# Patient Record
Sex: FEMALE | Race: WHITE | NOT HISPANIC OR LATINO | Employment: OTHER | URBAN - METROPOLITAN AREA
[De-identification: names, ages, dates, MRNs, and addresses within clinical notes are randomized per-mention and may not be internally consistent; named-entity substitution may affect disease eponyms.]

---

## 2017-07-20 ENCOUNTER — CONVERSION ENCOUNTER (OUTPATIENT)
Dept: OTHER | Facility: HOSPITAL | Age: 57
End: 2017-07-20

## 2018-02-14 ENCOUNTER — OFFICE VISIT CONVERTED (OUTPATIENT)
Dept: GASTROENTEROLOGY | Facility: CLINIC | Age: 58
End: 2018-02-14
Attending: NURSE PRACTITIONER

## 2018-02-14 ENCOUNTER — CONVERSION ENCOUNTER (OUTPATIENT)
Dept: GASTROENTEROLOGY | Facility: CLINIC | Age: 58
End: 2018-02-14

## 2018-02-28 ENCOUNTER — OFFICE VISIT CONVERTED (OUTPATIENT)
Dept: FAMILY MEDICINE CLINIC | Age: 58
End: 2018-02-28
Attending: FAMILY MEDICINE

## 2018-03-01 LAB
ALBUMIN SERPL-MCNC: 4.6 G/DL
ALBUMIN/GLOB SERPL: 2.3 {RATIO}
ALP SERPL-CCNC: 62 IU/L
ALT SERPL-CCNC: 15 IU/L
AST SERPL-CCNC: 19 IU/L
BILIRUB SERPL-MCNC: 0.6 MG/DL
BUN SERPL-MCNC: 19 MG/DL
BUN/CREAT SERPL: 20
CALCIUM SERPL-MCNC: 9.4 MG/DL
CHLORIDE SERPL-SCNC: 100 MMOL/L
CHOLEST SERPL-MCNC: 144 MG/DL
CO2 SERPL-SCNC: 24 MMOL/L
CONV TOTAL PROTEIN: 6.6 G/DL
CREAT UR-MCNC: 0.95 MG/DL
GLOBULIN UR ELPH-MCNC: 2 G/DL
GLUCOSE SERPL-MCNC: 84 MG/DL
HDLC SERPL-MCNC: 67 MG/DL
LDLC SERPL CALC-MCNC: 68 MG/DL
POTASSIUM SERPL-SCNC: 4.7 MMOL/L
SODIUM SERPL-SCNC: 142 MMOL/L
TRIGL SERPL-MCNC: 44 MG/DL
TSH SERPL-ACNC: 1.95 UIU/ML
VLDLC SERPL-MCNC: 9 MG/DL

## 2018-05-14 ENCOUNTER — OFFICE VISIT CONVERTED (OUTPATIENT)
Dept: GASTROENTEROLOGY | Facility: CLINIC | Age: 58
End: 2018-05-14
Attending: NURSE PRACTITIONER

## 2018-07-03 ENCOUNTER — OFFICE VISIT CONVERTED (OUTPATIENT)
Dept: FAMILY MEDICINE CLINIC | Age: 58
End: 2018-07-03
Attending: FAMILY MEDICINE

## 2018-07-26 ENCOUNTER — CONVERSION ENCOUNTER (OUTPATIENT)
Dept: MAMMOGRAPHY | Facility: HOSPITAL | Age: 58
End: 2018-07-26

## 2018-10-05 ENCOUNTER — OFFICE VISIT CONVERTED (OUTPATIENT)
Dept: FAMILY MEDICINE CLINIC | Age: 58
End: 2018-10-05
Attending: FAMILY MEDICINE

## 2018-11-14 ENCOUNTER — CONVERSION ENCOUNTER (OUTPATIENT)
Dept: GASTROENTEROLOGY | Facility: CLINIC | Age: 58
End: 2018-11-14

## 2018-11-14 ENCOUNTER — OFFICE VISIT CONVERTED (OUTPATIENT)
Dept: GASTROENTEROLOGY | Facility: CLINIC | Age: 58
End: 2018-11-14
Attending: NURSE PRACTITIONER

## 2019-01-09 ENCOUNTER — OFFICE VISIT CONVERTED (OUTPATIENT)
Dept: FAMILY MEDICINE CLINIC | Age: 59
End: 2019-01-09
Attending: FAMILY MEDICINE

## 2019-01-16 ENCOUNTER — HOSPITAL ENCOUNTER (OUTPATIENT)
Dept: OTHER | Facility: HOSPITAL | Age: 59
Discharge: HOME | End: 2019-01-16
Attending: FAMILY MEDICINE

## 2019-04-01 ENCOUNTER — OFFICE VISIT CONVERTED (OUTPATIENT)
Dept: FAMILY MEDICINE CLINIC | Age: 59
End: 2019-04-01
Attending: FAMILY MEDICINE

## 2019-04-01 ENCOUNTER — HOSPITAL ENCOUNTER (OUTPATIENT)
Dept: OTHER | Facility: HOSPITAL | Age: 59
Discharge: HOME | End: 2019-04-01
Attending: FAMILY MEDICINE

## 2019-04-01 LAB
ALBUMIN SERPL-MCNC: 4.2 G/DL (ref 3.5–5)
ALBUMIN/GLOB SERPL: 1.6 {RATIO} (ref 1.4–2.6)
ALP SERPL-CCNC: 56 U/L (ref 53–141)
ALT SERPL-CCNC: 16 U/L (ref 10–40)
ANION GAP SERPL CALC-SCNC: 15 MMOL/L (ref 8–19)
AST SERPL-CCNC: 25 U/L (ref 15–50)
BILIRUB SERPL-MCNC: 0.47 MG/DL (ref 0.2–1.3)
BUN SERPL-MCNC: 22 MG/DL (ref 5–25)
BUN/CREAT SERPL: 24 {RATIO} (ref 6–20)
CALCIUM SERPL-MCNC: 8.7 MG/DL (ref 8.7–10.4)
CHLORIDE SERPL-SCNC: 103 MMOL/L (ref 99–111)
CHOLEST SERPL-MCNC: 120 MG/DL (ref 107–200)
CHOLEST/HDLC SERPL: 2 {RATIO} (ref 3–6)
CONV CO2: 25 MMOL/L (ref 22–32)
CONV TOTAL PROTEIN: 6.9 G/DL (ref 6.3–8.2)
CREAT UR-MCNC: 0.92 MG/DL (ref 0.5–0.9)
GFR SERPLBLD BASED ON 1.73 SQ M-ARVRAT: >60 ML/MIN/{1.73_M2}
GLOBULIN UR ELPH-MCNC: 2.7 G/DL (ref 2–3.5)
GLUCOSE SERPL-MCNC: 84 MG/DL (ref 65–99)
HDLC SERPL-MCNC: 61 MG/DL (ref 40–60)
LDLC SERPL CALC-MCNC: 52 MG/DL (ref 70–100)
OSMOLALITY SERPL CALC.SUM OF ELEC: 291 MOSM/KG (ref 273–304)
POTASSIUM SERPL-SCNC: 4.2 MMOL/L (ref 3.5–5.3)
SODIUM SERPL-SCNC: 139 MMOL/L (ref 135–147)
TRIGL SERPL-MCNC: 33 MG/DL (ref 40–150)
TSH SERPL-ACNC: 1.12 M[IU]/L (ref 0.27–4.2)
VLDLC SERPL-MCNC: 7 MG/DL (ref 5–37)

## 2019-04-04 LAB — GABAPENTIN UR-MCNC: 125.8 UG/ML

## 2019-04-12 LAB
CONV TRAMADOL (URINE): NORMAL NG/ML
O-DESMETHYLTRAMADOL, UR: 1762 NG/ML

## 2019-05-14 ENCOUNTER — OFFICE VISIT CONVERTED (OUTPATIENT)
Dept: GASTROENTEROLOGY | Facility: CLINIC | Age: 59
End: 2019-05-14
Attending: NURSE PRACTITIONER

## 2019-07-03 ENCOUNTER — OFFICE VISIT CONVERTED (OUTPATIENT)
Dept: FAMILY MEDICINE CLINIC | Age: 59
End: 2019-07-03
Attending: FAMILY MEDICINE

## 2019-07-29 ENCOUNTER — HOSPITAL ENCOUNTER (OUTPATIENT)
Dept: OTHER | Facility: HOSPITAL | Age: 59
Discharge: HOME | End: 2019-07-29
Attending: SPECIALIST

## 2019-10-08 ENCOUNTER — OFFICE VISIT CONVERTED (OUTPATIENT)
Dept: FAMILY MEDICINE CLINIC | Age: 59
End: 2019-10-08
Attending: FAMILY MEDICINE

## 2019-10-08 ENCOUNTER — HOSPITAL ENCOUNTER (OUTPATIENT)
Dept: OTHER | Facility: HOSPITAL | Age: 59
Discharge: HOME | End: 2019-10-08
Attending: FAMILY MEDICINE

## 2019-10-08 LAB
ALBUMIN SERPL-MCNC: 4.3 G/DL (ref 3.5–5)
ALBUMIN/GLOB SERPL: 1.6 {RATIO} (ref 1.4–2.6)
ALP SERPL-CCNC: 59 U/L (ref 53–141)
ALT SERPL-CCNC: 22 U/L (ref 10–40)
ANION GAP SERPL CALC-SCNC: 16 MMOL/L (ref 8–19)
AST SERPL-CCNC: 29 U/L (ref 15–50)
BILIRUB SERPL-MCNC: 0.5 MG/DL (ref 0.2–1.3)
BUN SERPL-MCNC: 18 MG/DL (ref 5–25)
BUN/CREAT SERPL: 22 {RATIO} (ref 6–20)
CALCIUM SERPL-MCNC: 9.1 MG/DL (ref 8.7–10.4)
CHLORIDE SERPL-SCNC: 104 MMOL/L (ref 99–111)
CHOLEST SERPL-MCNC: 136 MG/DL (ref 107–200)
CHOLEST/HDLC SERPL: 2.2 {RATIO} (ref 3–6)
CONV CO2: 24 MMOL/L (ref 22–32)
CONV TOTAL PROTEIN: 7 G/DL (ref 6.3–8.2)
CREAT UR-MCNC: 0.83 MG/DL (ref 0.5–0.9)
GFR SERPLBLD BASED ON 1.73 SQ M-ARVRAT: >60 ML/MIN/{1.73_M2}
GLOBULIN UR ELPH-MCNC: 2.7 G/DL (ref 2–3.5)
GLUCOSE SERPL-MCNC: 93 MG/DL (ref 65–99)
HDLC SERPL-MCNC: 62 MG/DL (ref 40–60)
LDLC SERPL CALC-MCNC: 64 MG/DL (ref 70–100)
OSMOLALITY SERPL CALC.SUM OF ELEC: 292 MOSM/KG (ref 273–304)
POTASSIUM SERPL-SCNC: 4.4 MMOL/L (ref 3.5–5.3)
SODIUM SERPL-SCNC: 140 MMOL/L (ref 135–147)
TRIGL SERPL-MCNC: 49 MG/DL (ref 40–150)
TSH SERPL-ACNC: 0.55 M[IU]/L (ref 0.27–4.2)
VLDLC SERPL-MCNC: 10 MG/DL (ref 5–37)

## 2019-10-10 LAB — GABAPENTIN UR-MCNC: 230.9 UG/ML

## 2019-10-12 LAB
CONV TRAMADOL (URINE): 2631 NG/ML
O-DESMETHYLTRAMADOL, UR: 805 NG/ML

## 2019-11-14 ENCOUNTER — OFFICE VISIT CONVERTED (OUTPATIENT)
Dept: GASTROENTEROLOGY | Facility: CLINIC | Age: 59
End: 2019-11-14
Attending: NURSE PRACTITIONER

## 2020-01-08 ENCOUNTER — OFFICE VISIT CONVERTED (OUTPATIENT)
Dept: FAMILY MEDICINE CLINIC | Age: 60
End: 2020-01-08
Attending: FAMILY MEDICINE

## 2020-04-15 ENCOUNTER — OFFICE VISIT CONVERTED (OUTPATIENT)
Dept: FAMILY MEDICINE CLINIC | Age: 60
End: 2020-04-15
Attending: FAMILY MEDICINE

## 2020-05-14 ENCOUNTER — TELEPHONE CONVERTED (OUTPATIENT)
Dept: GASTROENTEROLOGY | Facility: CLINIC | Age: 60
End: 2020-05-14
Attending: NURSE PRACTITIONER

## 2020-07-09 ENCOUNTER — OFFICE VISIT CONVERTED (OUTPATIENT)
Dept: FAMILY MEDICINE CLINIC | Age: 60
End: 2020-07-09
Attending: FAMILY MEDICINE

## 2020-07-09 ENCOUNTER — HOSPITAL ENCOUNTER (OUTPATIENT)
Dept: OTHER | Facility: HOSPITAL | Age: 60
Discharge: HOME | End: 2020-07-09
Attending: FAMILY MEDICINE

## 2020-07-09 LAB
ALBUMIN SERPL-MCNC: 4.4 G/DL (ref 3.5–5)
ALBUMIN/GLOB SERPL: 1.9 {RATIO} (ref 1.4–2.6)
ALP SERPL-CCNC: 66 U/L (ref 43–160)
ALT SERPL-CCNC: 34 U/L (ref 10–40)
ANION GAP SERPL CALC-SCNC: 15 MMOL/L (ref 8–19)
AST SERPL-CCNC: 37 U/L (ref 15–50)
BILIRUB SERPL-MCNC: 0.49 MG/DL (ref 0.2–1.3)
BUN SERPL-MCNC: 31 MG/DL (ref 5–25)
BUN/CREAT SERPL: 30 {RATIO} (ref 6–20)
CALCIUM SERPL-MCNC: 9.1 MG/DL (ref 8.7–10.4)
CHLORIDE SERPL-SCNC: 105 MMOL/L (ref 99–111)
CHOLEST SERPL-MCNC: 127 MG/DL (ref 107–200)
CHOLEST/HDLC SERPL: 1.9 {RATIO} (ref 3–6)
CONV CO2: 24 MMOL/L (ref 22–32)
CONV TOTAL PROTEIN: 6.7 G/DL (ref 6.3–8.2)
CREAT UR-MCNC: 1.05 MG/DL (ref 0.5–0.9)
GFR SERPLBLD BASED ON 1.73 SQ M-ARVRAT: 57 ML/MIN/{1.73_M2}
GLOBULIN UR ELPH-MCNC: 2.3 G/DL (ref 2–3.5)
GLUCOSE SERPL-MCNC: 84 MG/DL (ref 65–99)
HDLC SERPL-MCNC: 66 MG/DL (ref 40–60)
LDLC SERPL CALC-MCNC: 54 MG/DL (ref 70–100)
OSMOLALITY SERPL CALC.SUM OF ELEC: 294 MOSM/KG (ref 273–304)
POTASSIUM SERPL-SCNC: 4.7 MMOL/L (ref 3.5–5.3)
SODIUM SERPL-SCNC: 139 MMOL/L (ref 135–147)
TRIGL SERPL-MCNC: 35 MG/DL (ref 40–150)
TSH SERPL-ACNC: 0.98 M[IU]/L (ref 0.27–4.2)
VLDLC SERPL-MCNC: 7 MG/DL (ref 5–37)

## 2020-08-24 ENCOUNTER — HOSPITAL ENCOUNTER (OUTPATIENT)
Dept: OTHER | Facility: HOSPITAL | Age: 60
Discharge: HOME | End: 2020-08-24
Attending: FAMILY MEDICINE

## 2020-09-11 ENCOUNTER — CONVERSION ENCOUNTER (OUTPATIENT)
Dept: FAMILY MEDICINE CLINIC | Age: 60
End: 2020-09-11

## 2020-09-11 ENCOUNTER — HOSPITAL ENCOUNTER (OUTPATIENT)
Dept: OTHER | Facility: HOSPITAL | Age: 60
Discharge: HOME | End: 2020-09-11
Attending: INTERNAL MEDICINE

## 2020-09-13 LAB — SARS-COV-2 RNA SPEC QL NAA+PROBE: NOT DETECTED

## 2020-09-16 ENCOUNTER — HOSPITAL ENCOUNTER (OUTPATIENT)
Dept: GASTROENTEROLOGY | Facility: HOSPITAL | Age: 60
Setting detail: HOSPITAL OUTPATIENT SURGERY
Discharge: HOME | End: 2020-09-16
Attending: INTERNAL MEDICINE

## 2020-10-15 ENCOUNTER — OFFICE VISIT CONVERTED (OUTPATIENT)
Dept: FAMILY MEDICINE CLINIC | Age: 60
End: 2020-10-15
Attending: FAMILY MEDICINE

## 2020-11-16 ENCOUNTER — OFFICE VISIT CONVERTED (OUTPATIENT)
Dept: GASTROENTEROLOGY | Facility: CLINIC | Age: 60
End: 2020-11-16
Attending: NURSE PRACTITIONER

## 2021-02-02 ENCOUNTER — HOSPITAL ENCOUNTER (OUTPATIENT)
Dept: OTHER | Facility: HOSPITAL | Age: 61
Discharge: HOME | End: 2021-02-02
Attending: FAMILY MEDICINE

## 2021-04-15 ENCOUNTER — OFFICE VISIT CONVERTED (OUTPATIENT)
Dept: FAMILY MEDICINE CLINIC | Age: 61
End: 2021-04-15
Attending: FAMILY MEDICINE

## 2021-04-15 ENCOUNTER — HOSPITAL ENCOUNTER (OUTPATIENT)
Dept: OTHER | Facility: HOSPITAL | Age: 61
Discharge: HOME | End: 2021-04-15
Attending: FAMILY MEDICINE

## 2021-04-15 LAB
ALBUMIN SERPL-MCNC: 4.3 G/DL (ref 3.5–5)
ALBUMIN/GLOB SERPL: 2 {RATIO} (ref 1.4–2.6)
ALP SERPL-CCNC: 59 U/L (ref 43–160)
ALT SERPL-CCNC: 30 U/L (ref 10–40)
ANION GAP SERPL CALC-SCNC: 15 MMOL/L (ref 8–19)
AST SERPL-CCNC: 33 U/L (ref 15–50)
BASOPHILS # BLD MANUAL: 0.03 10*3/UL (ref 0–0.2)
BASOPHILS NFR BLD MANUAL: 0.5 % (ref 0–3)
BILIRUB SERPL-MCNC: 0.4 MG/DL (ref 0.2–1.3)
BUN SERPL-MCNC: 19 MG/DL (ref 5–25)
BUN/CREAT SERPL: 18 {RATIO} (ref 6–20)
CALCIUM SERPL-MCNC: 8.7 MG/DL (ref 8.7–10.4)
CHLORIDE SERPL-SCNC: 102 MMOL/L (ref 99–111)
CHOLEST SERPL-MCNC: 135 MG/DL (ref 107–200)
CHOLEST/HDLC SERPL: 2.4 {RATIO} (ref 3–6)
CONV CO2: 26 MMOL/L (ref 22–32)
CONV TOTAL PROTEIN: 6.5 G/DL (ref 6.3–8.2)
CREAT UR-MCNC: 1.03 MG/DL (ref 0.5–0.9)
DEPRECATED RDW RBC AUTO: 46.7 FL
EOSINOPHIL # BLD MANUAL: 0.17 10*3/UL (ref 0–0.7)
EOSINOPHIL NFR BLD MANUAL: 2.6 % (ref 0–7)
ERYTHROCYTE [DISTWIDTH] IN BLOOD BY AUTOMATED COUNT: 12.9 % (ref 11.5–14.5)
GFR SERPLBLD BASED ON 1.73 SQ M-ARVRAT: 58 ML/MIN/{1.73_M2}
GLOBULIN UR ELPH-MCNC: 2.2 G/DL (ref 2–3.5)
GLUCOSE SERPL-MCNC: 82 MG/DL (ref 65–99)
GRANS (ABSOLUTE): 3.68 10*3/UL (ref 2–8)
GRANS: 57.1 % (ref 30–85)
HBA1C MFR BLD: 13.1 G/DL (ref 12–16)
HCT VFR BLD AUTO: 40.4 % (ref 37–47)
HDLC SERPL-MCNC: 57 MG/DL (ref 40–60)
IMM GRANULOCYTES # BLD: 0.01 10*3/UL (ref 0–0.54)
IMM GRANULOCYTES NFR BLD: 0.2 % (ref 0–0.43)
LDLC SERPL CALC-MCNC: 67 MG/DL (ref 70–100)
LYMPHOCYTES # BLD MANUAL: 2.08 10*3/UL (ref 1–5)
LYMPHOCYTES NFR BLD MANUAL: 7.3 % (ref 3–10)
MCH RBC QN AUTO: 31.5 PG (ref 27–31)
MCHC RBC AUTO-ENTMCNC: 32.4 G/DL (ref 33–37)
MCV RBC AUTO: 97.1 FL (ref 81–99)
MONOCYTES # BLD AUTO: 0.47 10*3/UL (ref 0.2–1.2)
OSMOLALITY SERPL CALC.SUM OF ELEC: 289 MOSM/KG (ref 273–304)
PLATELET # BLD AUTO: 209 10*3/UL (ref 130–400)
PMV BLD AUTO: 10.2 FL (ref 7.4–10.4)
POTASSIUM SERPL-SCNC: 4.4 MMOL/L (ref 3.5–5.3)
RBC # BLD AUTO: 4.16 10*6/UL (ref 4.2–5.4)
SODIUM SERPL-SCNC: 139 MMOL/L (ref 135–147)
TRIGL SERPL-MCNC: 56 MG/DL (ref 40–150)
TSH SERPL-ACNC: 2.48 M[IU]/L (ref 0.27–4.2)
VARIANT LYMPHS NFR BLD MANUAL: 32.3 % (ref 20–45)
VLDLC SERPL-MCNC: 11 MG/DL (ref 5–37)
WBC # BLD AUTO: 6.44 10*3/UL (ref 4.8–10.8)

## 2021-05-13 NOTE — PROGRESS NOTES
"   Quick Note      Patient Name: Addie Yates   Patient ID: [id number]   Sex: Female   YOB: 1960    Primary Care Provider: Jammie Phillips MD   Referring Provider: Jammie Phillips MD    Visit Date: May 14, 2020    Provider: KEVON De La Garza   Location: [facility]   Location Address: [address] [place] [address], [address]  [place], [place]  [id number]   Location Phone: [phone]          History Of Present Illness  TELEHEALTH TELEPHONE VISIT  Chief Complaint: f/u constipation and dysphagia   Addie Yates is a 60 year old /White female who is presenting for evaluation via telehealth telephone visit. Verbal consent obtained before beginning visit.   Provider spent 7 minutes with the patient during telehealth visit.   The following staff were present during this visit: Shona Garcia MA, KEVON De La Garza   Past Medical History/Overview of Patient Symptoms     She reports that she is having a bowel movement about once per week, however the amount of stool is large.  States that she feels like she is empty following bowel movements.  She has noticed some residual stool following a bowel movement when she wipes.  She is due for screening colonoscopy.    She is continuing to experience some dysphagia.  She is swallowing twice to get foods to go down.  Drinking with meals.  Denies choking.  Heartburn is controlled with pantoprazole.      She continues to experience some epigastric discomfort with meals that is relieved with gas-x.       Vitals  Date Time BP Position Site L\R Cuff Size HR RR TEMP (F) WT  HT  BMI kg/m2 BSA m2 O2 Sat        05/14/2020 10:33 AM         130lbs 0oz 5'  4\" 22.31 1.63               Assessment  · Dysphagia     787.20/R13.10  · GERD (gastroesophageal reflux disease)     530.81/K21.9  · Chronic idiopathic constipation     564.00/K59.04  · Gastroparesis     536.3/K31.84  · Screening for colon cancer     V76.51/Z12.11      Plan  · Orders  o Physican Telephone " evaluation, 5-10 min (10878) - - 05/14/2020  o Consent for Colonoscopy Screening -Possible risk/complications, benefits, and alternatives to surgical or invasive procedure have been explained to patient and/or legal gaurdian. -Patient has been evaluated and can tolerate anethesia and/or sedation. Risk, benefits, and alternatives to anesthesia and sedation have been explained to patient or legal gaurdian. () - - 05/14/2020  · Medications  o Motegrity 2 mg oral tablet   SIG: take 1 tablet (2 mg) by oral route once daily for 30 days   DISP: (30) tablets with 5 refills  Refilled on 05/14/2020     o pantoprazole 40 mg oral tablet,delayed release (DR/EC)   SIG: take 1 tablet (40 mg) by oral route once daily for 90 days   DISP: (90) Tablet with 5 refills  Refilled on 05/14/2020     o Medications have been Reconciled  · Disposition  o Follow up 6 months            Electronically Signed by: KEVON De La Garza -Author on May 14, 2020 10:52:44 AM

## 2021-05-13 NOTE — PROGRESS NOTES
Progress Note      Patient Name: Addie Yates   Patient ID: [id number]   Sex: Female   YOB: 1960    Primary Care Provider: Jammie Phillips MD   Referring Provider: Jammie Phillips MD    Visit Date: November 16, 2020    Provider: KEVON De La Garza   Location: [facility]   Location Address: [address] [place] [address], [address]  [place], [place]  [id number]   Location Phone: [phone]          Chief Complaint  · Follow up of EGD/Colonoscopy      History Of Present Illness     Ms. Yates presents for follow-up of dysphagia, GERD, constipation, gastroparesis and screening for colorectal cancer.    9/16/2020 colonoscopy-mild diverticulosis in the sigmoid colon.  Irregularly-shaped patchy polypoid tissue in the distal 15 mm of the rectum.  This extended greater than 75% of the circumference of the distal rectum wall, biopsy-high-grade squamous intraepithelial lesion arising in a condyloma.    She underwent a procedure on 10/7 per colorectal surgery and states that she has another procedure scheduled for tomorrow morning.    She is continuing to experience constipation.  Reports that initially motegrity was working well, but now it's not working as well.  Also takes Colace BID.   She is having a bowel movement about once per week and admits straining with bowel movements.      She does not drink water at all.  Diet pepsi - 1 per day.  2 cups of hot tea daily and 2 cups of coffee daily.       Past Medical History  Arthritis; Carpal tunnel syndrome; GERD; Heart Attack (MI); Medial epicondylitis, right elbow; Occipital neuralgia; Pain, Arm; Pain, Back; Pain, Cervical Spine; Pain, Chronic Pain Syndrome; Pain, Generalized; Pain, Head; Pain, Leg; Pain, Thoracic Spine; Pneumonia; Thoracic outlet syndrome; Thyroid Disease; Vision Problems         Past Surgical History  cardiac stents; Cataract surgery; Cholecystecomy; Colonoscopy; EGD; Glaucoma surgery; Hysterectomy; Tubal ligation; Tumor  "removal         Medication List  aspirin 81 mg oral tablet,delayed release (DR/EC); atorvastatin 80 mg oral tablet; Calcium 500 + D (D3) 500 mg(1,250mg) -125 unit oral tablet; diclofenac sodium 75 mg oral tablet,delayed release (DR/EC); duloxetine 60 mg oral capsule,delayed release(DR/EC); gabapentin 300 mg oral capsule; Gas-X Extra Strength 125 mg oral capsule; levothyroxine 88 mcg Oral tablet; lisinopril 5 mg oral tablet; Motegrity 2 mg oral tablet; pantoprazole 40 mg oral tablet,delayed release (DR/EC); Prolia 60 mg/mL subcutaneous syringe; tramadol 50 mg oral tablet         Allergy List  NO KNOWN DRUG ALLERGIES       Allergies Reconciled  Family Medical History  Family history of breast cancer; Family history of kidney cancer; Family history of heart attack; Family history of malignant neoplasm of ureter         Social History  Alcohol (Former); Tobacco (Former)         Review of Systems  · Constitutional  o Denies  o : chills, fever  · Cardiovascular  o Denies  o : chest pain, irregular heart beats  · Respiratory  o Denies  o : cough, shortness of breath  · Gastrointestinal  o Admits  o : see HPI   · Endocrine  o Denies  o : weight gain, weight loss      Vitals  Date Time BP Position Site L\R Cuff Size HR RR TEMP (F) WT  HT  BMI kg/m2 BSA m2 O2 Sat FR L/min FiO2        11/16/2020 10:59 /86 Sitting      98.1 137lbs 6oz 5'  4\" 23.58 1.68             Physical Examination  · Constitutional  o Appearance  o : Healthy-appearing, awake and alert in no acute distress  · Head and Face  o Head  o : Normocephalic with no worriesome skin lesions  · Eyes  o Vision  o :   § Visual Fields  § : eyes move symmetrical in all directions  o Sclerae  o : sclerae anicteric  o Pupils and Irises  o : pupils equal and symmetrical  · Neck  o Inspection/Palpation  o : Trachea is midline, no adenopathy  · Respiratory  o Respiratory Effort  o : Breathing is unlabored.  o Inspection of Chest  o : normal appearance  o Auscultation of " Lungs  o : Chest is clear to auscultation bilaterally.  · Cardiovascular  o Heart  o :   § Auscultation of Heart  § : no murmurs, rubs, or gallops  o Peripheral Vascular System  o :   § Extremities  § : no cyanosis, clubbing or edema;   · Gastrointestinal  o Abdominal Examination  o : Abdomen is soft, nontender to palpation, with normal active bowel sounds, no appreciable hepatosplenomegaly.  o Digital Rectal Exam  o : deferred  · Skin and Subcutaneous Tissue  o General Inspection  o : without focal lesions; turgor is normal  · Psychiatric  o General  o : Alert and oriented x3  o Mood and Affect  o : Mood and affect are appropriate to circumstances  · Extremities  o Extremities  o : No edema, no cyanosis          Assessment  · Chronic idiopathic constipation     564.00/K59.04  · Gastroparesis     536.3/K31.84  · Condyloma acuminata     078.11/A63.0      Plan  · Medications  o Linzess 290 mcg oral capsule   SIG: take 1 capsule (290 mcg) by oral route once daily on an empty stomach at least 30 minutes before 1st meal of the day for 30 days   DISP: (30) Capsule with 5 refills  Prescribed on 11/16/2020     o Motegrity 2 mg oral tablet   SIG: take 1 tablet (2 mg) by oral route once daily for 30 days   DISP: (30) Tablet with 5 refills  Refilled on 11/16/2020     o Colace 100 mg oral capsule   SIG: take 1 capsule (100 mg) by oral route 2 times per day for 90 days   DISP: (180) capsule with 3 refills  Discontinued on 11/16/2020     o Medications have been Reconciled  · Instructions  o Information given on current diagnoses. Increase fluid intake. Recommend 64 oz water per day. Encouraged patient to keep f/u with c/r surgery.   · Disposition  o Follow up 6 months            Electronically Signed by: KEVON De La Garza -Author on November 16, 2020 04:24:29 PM

## 2021-05-14 VITALS
WEIGHT: 137.37 LBS | HEIGHT: 64 IN | BODY MASS INDEX: 23.45 KG/M2 | TEMPERATURE: 98.1 F | SYSTOLIC BLOOD PRESSURE: 138 MMHG | DIASTOLIC BLOOD PRESSURE: 86 MMHG

## 2021-05-15 VITALS
HEIGHT: 64 IN | BODY MASS INDEX: 22.43 KG/M2 | DIASTOLIC BLOOD PRESSURE: 76 MMHG | SYSTOLIC BLOOD PRESSURE: 124 MMHG | WEIGHT: 131.37 LBS

## 2021-05-15 VITALS — WEIGHT: 130 LBS | HEIGHT: 64 IN | BODY MASS INDEX: 22.2 KG/M2

## 2021-05-15 VITALS
HEIGHT: 64 IN | WEIGHT: 132.37 LBS | BODY MASS INDEX: 22.6 KG/M2 | SYSTOLIC BLOOD PRESSURE: 119 MMHG | DIASTOLIC BLOOD PRESSURE: 79 MMHG

## 2021-05-16 VITALS
DIASTOLIC BLOOD PRESSURE: 67 MMHG | SYSTOLIC BLOOD PRESSURE: 111 MMHG | BODY MASS INDEX: 22.75 KG/M2 | HEIGHT: 64 IN | WEIGHT: 133.25 LBS

## 2021-05-16 VITALS
BODY MASS INDEX: 22.26 KG/M2 | SYSTOLIC BLOOD PRESSURE: 127 MMHG | DIASTOLIC BLOOD PRESSURE: 80 MMHG | HEIGHT: 64 IN | WEIGHT: 130.37 LBS

## 2021-05-16 VITALS
SYSTOLIC BLOOD PRESSURE: 122 MMHG | DIASTOLIC BLOOD PRESSURE: 78 MMHG | WEIGHT: 134 LBS | BODY MASS INDEX: 22.88 KG/M2 | HEIGHT: 64 IN

## 2021-05-17 ENCOUNTER — OFFICE VISIT CONVERTED (OUTPATIENT)
Dept: GASTROENTEROLOGY | Facility: CLINIC | Age: 61
End: 2021-05-17
Attending: NURSE PRACTITIONER

## 2021-05-18 NOTE — PROGRESS NOTES
Addie Yates S  1960     Office/Outpatient Visit    Visit Date: u, Apr 15, 2021 02:33 pm    Provider: Jammie Phillips MD (Assistant: Eugenia Oakes MA)    Location: [facility]        Electronically signed by Jammie Phillips MD on  04/15/2021 05:33:29 PM                             Subjective:        CC: Ms. Yates is a 61 year old White female.  Patient presents today for six month follow up;         HPI: Addie is here today for routine f/u on chronic issues.        She is on diclofenac, tramadol and gabapentin for chronic neck and back pain d/t generalized OA and scoliosis.  She takes the gabapentin 300 mg BID and the tramadol about once daily.  No adverse effects.  She is following with Dr. [facility].  She is getting shots but not helping.        She is on Cymbalta, now at 60 mg, for anxiety.  We were switched over to see if she could get control in her anxiety as well as her back pain.  Sx have been well controlled.  No anxiety or panic attacks.          She is on lisinopril, atorvastatin, and ASA/Plavix for CAD s/p stenting in 11/2015.  Follows with Wayne Daniels.  No CP, palpitations, SOB.  Stress test back in November looked good.        She is on levothyroxine for hypothyroidism.  No heat/cold intolerance, no changes in hair or skin.        She is on pantoprazole for GERD and esophageal spasm.  She follows with Dr. Leroy.  Her last EGD showed hiatal hernia and gastritis.  Gastric emptying study has shown gastroparesis.  She is now on Colace, Miralax, and lactulose for constipation.        She is on Prolia for osteoporosis.  Last DEXA done 2/2021 showed osteopenia.    ROS:     CONSTITUTIONAL:  Negative for fatigue and fever.      EYES:  Negative for blurred vision.      CARDIOVASCULAR:  Negative for chest pain and palpitations.      RESPIRATORY:  Negative for recent cough and dyspnea.      GASTROINTESTINAL:  Positive for constipation.   Negative for abdominal  pain, diarrhea, nausea or vomiting.      MUSCULOSKELETAL:  Positive for arthralgias, back pain and limb pain ( left upper extremity pain ).   Negative for myalgias.      NEUROLOGICAL:  Positive for tremor.   Negative for paresthesias or weakness.          Past Medical History / Family History / Social History:         Last Reviewed on 4/15/2021 02:57 PM by Jammie Phillips    Past Medical History: R Thoracic Outlet Syndrome    R Lateral and Medial Epicondylitis     R Carpal Tunnel    Osteoarthritis    Adenomatous Colonic Polyp    Osteopenia    Constipation, severe and chronic    DDD L-spine    DDD C-spine    Hypothyroidism    Toxic Goiter    Hiatal Hernia    Glaucoma OU    Chronic Pain, Base of Skull/Uppermost Neck     Hemorrhoids                        PAST MEDICAL HISTORY             CURRENT MEDICAL PROVIDERS:    Dentist: Jaclyn    Dermatologist: Romie    Gastroenterologist: Paulette    Obstetrician/Gynecologist: Manny    Ophthalmologist: [facility]         PAST MEDICAL HISTORY                 ADVANCED DIRECTIVES: None         PREVENTIVE HEALTH MAINTENANCE             BONE DENSITY: was last done 2/2/2021 with the following abnormality noted-- Osteopenia OSTEOPOROSIS ON PROLIA     COLORECTAL CANCER SCREENING: Up to date (colonoscopy q10y; sigmoidoscopy q5y; Cologuard q3y) was last done 9/16/2020, Results are in chart; colonoscopy with the following abnormalities noted-- Polyp(s), rectal mass palpated, and Diverticulosis     Hepatitis C Medicare Screening: was last done 8/2017     MAMMOGRAM: Done within last 2 years and results in are chart was last done 8/24/2020 with normal results     PAP SMEAR: Hysterectomy 2004     Prolia Injection : last inj 8/13/20 at [facility]         PAST MEDICAL HISTORY                 ADVANCED DIRECTIVES: None         Surgical History: Bi-tubal Ligation    R Thyroid Bx    Colonoscopy with Adenoma '07, '10, '15    EGD '08, '16    Laser Rx OU     Catheterization/Stent times one 11-15             Cholecystectomy: laparoscopic; 2017;     Hysterectomy: 2004;         Family History: NO Colon, Breast, Ovarian CA    Leukemia    Kidney Cancer    Diabetes         Social History:         Marital Status:      Children: 1 child         Tobacco/Alcohol/Supplements:     Last Reviewed on 4/15/2021 02:57 PM by Jammie Phillips    Tobacco: She has a past history of cigarette smoking; quit date:  11/2004.          Substance Abuse History:     Last Reviewed on 4/15/2021 02:57 PM by Jammie Phillips        Mental Health History:     Last Reviewed on 4/15/2021 02:57 PM by Jammie Phillips        Communicable Diseases (eg STDs):     Last Reviewed on 4/15/2021 02:57 PM by Jammie Phillips        Current Problems:     Last Reviewed on 10/15/2020 03:08 PM by Jammie Phillips    CAD - Atherosclerotic heart disease of native coronary artery without angina pectoris    Hypothyroidism, unspecified    Primary generalized (osteo)arthritis    Long term (current) use of opiate analgesic    Esophageal spasm - Dyskinesia of esophagus    Hiatal hernia - Diaphragmatic hernia without obstruction or gangrene    GERD - Gastro-esophageal reflux disease without esophagitis    Gastroparesis    Scoliosis, site unspecified, Other idiopathic    Presence of intraocular lens    Anxiety disorder, unspecified    Unspecified open-angle glaucoma, stage unspecified    Unspecified hemorrhoids    Osteoporosis without current pathological fracture, age-related    Constipation, unspecified    Encounter for other preprocedural examination    Encounter for other screening for malignant neoplasm of breast    Encounter for general adult medical examination without abnormal findings    Encounter for screening for depression    Encounter for immunization        Immunizations:     influenza, injectable, quadrivalent, preservative free (AFLURIA QUAD 2020-21 (3YR UP)) 10/15/2020    Fluzone Quadrivalent (3+ years) 10/8/2019        Allergies:     Last Reviewed on  4/15/2021 02:36 PM by Eugenia Oakes    No Known Allergies.        Current Medications:     Last Reviewed on 4/15/2021 02:36 PM by Eugenia Oakes    Atorvastatin Calcium 80mg Tablet [1 tab hs]    levothyroxine 100 mcg oral tablet [TAKE ONE TABLET BY MOUTH EVERY DAY]    Prolia 60 mg/mL subcutaneous Syringe [give SQ q 6months]    Lisinopril 5 mg oral tablet [1 tab daily]    aspirin 81 mg oral tablet, delayed release (enteric coated) [1 tab daily]    traMADol 50 mg oral tablet [TAKE ONE TABLET BY MOUTH TWICE DAILY AS NEEDED]    gabapentin 300 mg oral capsule [TAKE 1 CAPSULE BY MOUTH EVERY DAY]    OTC Calcium 1200mg/Vitamin D3 1000IU daily     Colace 100 mg oral capsule [Take 2 capsule BID for constipation]    Gas-X     escitalopram oxalate 10 mg oral tablet [TAKE ONE TABLET BY MOUTH EVERY DAY]    Protonix 40 mg oral tablet, delayed release (enteric coated) [1 tab daily]    Constulose 10 gram/15 mL oral Solution [Take 2 Tablespoon by mouth daily]    Motegrity 2mg  [TAKE ONE TABLET BY MOUTH EVERY DAY]    diclofenac sodium 75 mg oral tablet, delayed release (enteric coated) [TAKE ONE TABLET BY MOUTH TWICE DAILY AS NEEDED]    DULoxetine 30 mg oral capsule,delayed release (enteric coated) [take 1 capsule (30 mg) by oral route once daily x 1 week then increase to 60 mg]    DULoxetine 60 mg oral capsule,delayed release (enteric coated) [TAKE ONE CAPSULE BY MOUTH EVERY DAY]        Objective:        Vitals:         Current: 4/15/2021 2:36:59 PM    Ht:  5 ft, 3.5 in;  Wt: 136.4 lbs;  BMI: 23.8T: 97.6 F (temporal);  BP: 109/74 mm Hg (right arm, sitting);  P: 68 bpm (right arm (BP Cuff), sitting);  sCr: 1.05 mg/dL;  GFR: 52.74        Exams:     PHYSICAL EXAM:     GENERAL: vital signs recorded - well developed, well nourished;  well groomed;  no apparent distress;     EYES: extraocular movements intact; conjunctiva and cornea are normal; PERRLA;     RESPIRATORY: normal respiratory rate and pattern with no distress; normal breath  sounds with no rales, rhonchi, wheezes or rubs;     CARDIOVASCULAR: normal rate; rhythm is regular;  no systolic murmur; no edema;     GASTROINTESTINAL: nontender; normal bowel sounds;     MUSCULOSKELETAL: normal gait; normal overall tone         Lab/Test Results:         Urine temperature: confirmed (04/15/2021),     All urine drug screen levels confirmed negative: yes (04/15/2021),     Date and time of last pill: Tramadol and Gabapentin 04/15/2021 @ 0830/AS (04/15/2021),     Performed by: tls (04/15/2021),     Collection Time: 1520 (04/15/2021),             Assessment:         M15.0   Primary generalized (osteo)arthritis       Z79.891   Long term (current) use of opiate analgesic       I25.10   CAD - Atherosclerotic heart disease of native coronary artery without angina pectoris       E03.9   Hypothyroidism, unspecified       K21.9   GERD - Gastro-esophageal reflux disease without esophagitis       F41.9   Anxiety disorder, unspecified           ORDERS:         Radiology/Test Orders:       3017F  Colorectal CA screen results documented and reviewed (PV)  (In-House)              Lab Orders:       53145  [facility]NLP - [facility] CMP AND LIPID: [id number]  (Send-Out)            87137  TSH - [facility] TSH  (Send-Out)            09040  Drug test prsmv qual dir optical obs per day  (In-House)            APPTO  Appointment need  (In-House)            31487  [facility] CBC with 3 part diff  (Send-Out)              Other Orders:         Screening mammogram results documented  (Send-Out)            1124F  Advance Care Planning discussed and doc in MR; no surrogate named or advance care plan provided  (Send-Out)                      Plan:         Primary generalized (osteo)arthritisStable on current regimen.  Sx well controlled.  No adverse effects.  She does require ongoing use of this controlled substance to function.  Tox screen and Ugo run today.  No refills needed.  RTC 3 months.    MIPS Vaccines Flu and Pneumonia updated in  Shot record Screening mammomgram done within last 2 years and results in are chart Colorectal Cancer Screening is up to date and the results are in the chart Advance Directive/Surrogate Decision Maker discussed and pt declines to complete today     FOLLOW-UP: Schedule a follow-up visit in 3 months.:.            Orders:         Screening mammogram results documented  (Send-Out)            3017F  Colorectal CA screen results documented and reviewed (PV)  (In-House)            APPTO  Appointment need  (In-House)            1124F  Advance Care Planning discussed and doc in MR; no surrogate named or advance care plan provided  (Send-Out)              Long term (current) use of opiate analgesic    Controlled substance documentation: Ugo reviewed; drug screen performed and appropriate; consent is reviewed and signed and on the chart.  She is aware of risk of addiction on this medication, understands that she will need to follow up for a review every 3 months and her medications will be adjusted or decreased as deemed appropriate at each visit.  No history of drug or alcohol abuse.  No concerns about diversion or abuse. She denies side effects related to the medication.  She is aware that she may be called in for pill counts.  The dosing of this medication will be reviewed on a regular basis and reduced if possible..  Ongoing use of a controlled substance is necessary for this patient to have a normal quality of life           Orders:       05902  Drug test prsmv qual dir optical obs per day  (In-House)              CAD - Atherosclerotic heart disease of native coronary artery without angina pectorisStable on ASA, atorvastatin 80 mg daily and lisinopril 5 mg daily.  No refills needed.  Checking labs.    LABORATORY:  Labs ordered to be performed today include CBC and HTN/Lipid Panel: CMP, Lipid.            Orders:       20617  [facility] CMP AND LIPID: [id number], [id number]  (Send-Out)            07625  [facility] CBC with  3 part diff  (Send-Out)              Hypothyroidism, unspecifiedChecking labs.  Will send refills when labs come back.    LABORATORY:  Labs ordered to be performed today include TSH.            Orders:       59992  TSH - [facility] TSH  (Send-Out)              GERD - Gastro-esophageal reflux disease without esophagitisFollowing with GI for perianal condyloma and other GI complaints.        Anxiety disorder, unspecifiedStable on duloxetine.  No refills needed today.            Patient Recommendations:        For  Primary generalized (osteo)arthritis:    Schedule a follow-up visit in 3 months.                APPOINTMENT INFORMATION:        Monday Tuesday Wednesday Thursday Friday Saturday Sunday            Time:___________________AM  PM   Date:_____________________             Charge Capture:         Primary Diagnosis:     M15.0  Primary generalized (osteo)arthritis           Orders:      10365  Office/outpatient visit; established patient, level 4  (In-House)            3017F  Colorectal CA screen results documented and reviewed (PV)  (In-House)            APPTO  Appointment need  (In-House)              Z79.891  Long term (current) use of opiate analgesic           Orders:      62990  Drug test prsmv qual dir optical obs per day  (In-House)              I25.10  CAD - Atherosclerotic heart disease of native coronary artery without angina pectoris     E03.9  Hypothyroidism, unspecified     K21.9  GERD - Gastro-esophageal reflux disease without esophagitis     F41.9  Anxiety disorder, unspecified

## 2021-05-18 NOTE — PROGRESS NOTES
Addie Yates 1960     Office/Outpatient Visit    Visit Date: Tue, Oct 8, 2019 09:32 am    Provider: Jammie Phillips MD (Assistant: Eugenia Oakes MA)    Location: [facility]        Electronically signed by Jammie Phillips MD on  10/08/2019 04:28:44 PM                             SUBJECTIVE:        CC:     Ms. Yates is a 59 year old White female.  Patient presents today for [facility] visit;         HPI:     She is UTD on colonoscopy, last done 3/0215 and this was normal.  Pap smear is no longer indicated by age and history; s/p hysterectomy.  She is due for mammogram, last done 6/2018 and this was normal.  She is UTD on DEXA, last done 1/2019 and this showed osteopenia.  She is due for Pneumovax, Prevnar, Shingrix, Havrix, Td and flu.  She is UTD on HTN panel and TSH.        She is on tramadol and gabapentin for chronic neck and back pain d/t generalized OA and scoliosis.  She is on gabapentin 300 mg daily.  She uses the tramadol 1-3 times weekly for breakthrough pain.  Pain is well controlled.  No adverse effects.  She has been sent to PT for her neck but that was ineffective.      Ms. Yates is here for a Medicare wellness visit.  ADVANCED DIRECTIVES: None     Returning to health checkup, the required HRA questions are integrated within this visit note. Family medical history and individual medical/surgical history were reviewed and updated.  A current height, weight, BMI, blood pressure, and pulse were recorded in the vitals section of the note and have been reviewed. Patient's medications, including supplements, were recorded in the chart and reviewed.  Current providers and suppliers were reviewed and updated.          Self-Assessment of Health: She rates her health as fair. She rates her confidence of being able to control/manage most of her health problems as very confident. Her physical/emotional health has limited her social activites slightly.  A review of cognitive impairment was performed,  including ability to drive a car, manage finances, and any memory changes, and was found to be negative.  A review of functional ability, including bathing, dressing, walking, and urine/bowel continence as well as level of safety was performed and was found to be negative.  Falls Risk: Has not had any falls or only one fall without injury in the past year.  She denies having trouble hearing the TV/radio when others do not, having to strain to hear or understand conversations and wearing hearing aid(s).  Concerning home safety, she reports that at home she DOES have adequate lighting, a skid resistant shower/tub, handrails on stairs, functioning smoke alarms and absence of throw rugs, but not grab bars in the bath.          Immunization Status: ** Has not received influenza vaccine for this season; Age>60, no shingles vaccination; Physical Activity: She exercises for at least 20 minutes 3 or more days/week.; Type of diet patient normally eats is described as low carb Tobacco: She has a past history of cigarette smoking; quit date:  2004.  Preventative Health updated today         PHQ-9 Depression Screening: Completed form scanned and in chart; Total Score 8     ROS:     CONSTITUTIONAL:  Negative for fatigue and fever.      EYES:  Negative for blurred vision.      E/N/T:  Negative for diminished hearing and nasal congestion.      CARDIOVASCULAR:  Negative for chest pain and palpitations.      RESPIRATORY:  Negative for recent cough and dyspnea.      GASTROINTESTINAL:  Positive for constipation.   Negative for abdominal pain, diarrhea, nausea or vomiting.      MUSCULOSKELETAL:  Positive for arthralgias, back pain and limb pain ( left upper extremity pain ).   Negative for myalgias.      PSYCHIATRIC:  Positive for sleep disturbance.   Negative for anxiety, crying spells, depression, feelings of stress, anhedonia or suicidal thoughts.          PMH/FMH/SH:     Last Reviewed on 10/08/2019 09:50 AM by Jammie Phillips     Past Medical History: R Thoracic Outlet Syndrome    R Lateral and Medial Epicondylitis     R Carpal Tunnel    Osteoarthritis    Adenomatous Colonic Polyp    Osteopenia    Constipation, severe and chronic    DDD L-spine    DDD C-spine    Hypothyroidism    Toxic Goiter    Hiatal Hernia    Glaucoma OU    Chronic Pain, Base of Skull/Uppermost Neck     Hemorrhoids                        PAST MEDICAL HISTORY             CURRENT MEDICAL PROVIDERS:    Dentist: Jaclyn    Dermatologist: Romie    Gastroenterologist: Paulette    Obstetrician/Gynecologist: Manny    Ophthalmologist: [facility]         PAST MEDICAL HISTORY                 ADVANCED DIRECTIVES: None         PREVENTIVE HEALTH MAINTENANCE             BONE DENSITY: was last done 1/16/2019 osteopenia     COLORECTAL CANCER SCREENING: Up to date (colonoscopy q10y; sigmoidoscopy q5y; Cologuard q3y) was last done 3/26/2015, Results are in chart; colonoscopy with normal results     Hepatitis C Medicare Screening: was last done 8/2017     MAMMOGRAM: Done within last 2 years and results in are chart was last done 06/2018 with normal results states she had one this year     PAP SMEAR: Hysterectomy 2004         PAST MEDICAL HISTORY                 ADVANCED DIRECTIVES: None         Surgical History: Bi-tubal Ligation    R Thyroid Bx    Colonoscopy with Adenoma '07, '10, '15    EGD '08, '16    Laser Rx OU     Catheterization/Stent times one 11-15            Cholecystectomy: laparoscopic; 2017;     Hysterectomy: 2004;         Family History: NO Colon, Breast, Ovarian CA    Leukemia    Kidney Cancer    Diabetes         Social History:         Marital Status:      Children: 1 child         Tobacco/Alcohol/Supplements:     Last Reviewed on 10/08/2019 09:50 AM by Jammie Phillips    Tobacco: She has a past history of cigarette smoking; quit date:  11/2004.          Substance Abuse History:     Last Reviewed on 10/08/2019 09:50 AM by Jammie Phillips        [facility]  History:     Last Reviewed on 10/08/2019 09:50 AM by Jammie Phillips        Communicable Diseases (eg STDs):     Last Reviewed on 10/08/2019 09:50 AM by Jammie Phillips            Current Problems:     Last Reviewed on 7/03/2019 02:54 PM by Jammie Phillips    Generalized osteoarthritis     Anxiety     Osteoporosis, other     Artificial lens replacement     Scoliosis     Gastroparesis     GERD     Esophageal spasm     Hiatal hernia     Use of high risk medications     Hemorrhoids, NOS     Unspecified glaucoma     Hypothyroidism     Osteopenia     Colon polyps     Osteoarthritis, NOS     CAD     Screening for depression         Immunizations:     None        Allergies:     Last Reviewed on 7/03/2019 02:54 PM by Jammie Phillips      No Known Drug Allergies.         Current Medications:     Last Reviewed on 7/03/2019 02:54 PM by Jammie Phillips    Synthroid 0.1mg Tablet 1 tab daily     Gabapentin 300mg Capsules 1 capsule daily     Tramadol 50mg Tablet 1 po BID PRN     Polyethylene Glycol 3350  Powder for Oral Solution 1 capful (17g) daily, mixed in 4-8oz of liquid daily     Atorvastatin Calcium 80mg Tablet 1 tab hs     Aspirin (ASA) 81mg Tablets, Enteric Coated 1 tab daily     Lisinopril 5mg Tablet 1 tab daily     Prolia 60mg/1ml Injection give SQ q 6months     Lexapro 5mg Tablet 1 tab po QD     Constulose 10gm/15ml Syrup Take 2 Tablespoon by mouth daily     Protonix 40mg Tablets, Delayed Release 1 tab daily     Gas-X     Colace 100mg Capsules Take 2 capsule BID for constipation     OTC Calcium 1200mg/Vitamin D3 1000IU daily         OBJECTIVE:        Vitals:         Current: 10/8/2019 9:37:57 AM    Ht:  5 ft, 3.5 in;  Wt: 135.2 lbs;  BMI: 23.6    T: 97.5 F (oral);  BP: 143/84 mm Hg (left arm, sitting);  P: 56 bpm (left arm (BP Cuff), sitting);  sCr: 0.92 mg/dL;  GFR: 61.46    VA: 20/20 OD, 20/20 OS (near, with correction)        Repeat:     10:10:34 AM     BP:   150/91mm Hg (left arm, sitting, HR: 76)         Exams:      PHYSICAL EXAM:     GENERAL: vital signs recorded - well developed, well nourished;  well groomed;  no apparent distress;     EYES: extraocular movements intact; conjunctiva and cornea are normal; PERRLA;     E/N/T: OROPHARYNX: cold sore L upper lateral lip;     RESPIRATORY: normal respiratory rate and pattern with no distress; normal breath sounds with no rales, rhonchi, wheezes or rubs;     CARDIOVASCULAR: normal rate; rhythm is regular;  no systolic murmur; no edema;     GASTROINTESTINAL: nontender; normal bowel sounds;     MUSCULOSKELETAL: normal gait; normal overall tone     NEUROLOGIC: mental status: alert and oriented x 3; Reflexes: brachioradialis: 2+; knee jerks: 2+;     PSYCHIATRIC: appropriate affect and demeanor; normal psychomotor function; normal speech pattern;         Lab/Test Results:             Urine temperature:  confirmed (10/08/2019),     All urine drug screen levels confirmed negative:  yes (10/08/2019),     Date and time of last pill:  Gabapentin 10/07/2019 @ 2130, Tramadol 10/08/2019 @ 0900/AS (10/08/2019),     Performed by:  tls (10/08/2019),     Collection Time:  1000 (10/08/2019),             Procedures:     Vaccination against other viral diseases, Influenza     1. Influenza, seasonal PF (children 3 years to adult): 0.5 ml unit dose given IM in the right upper arm; administered by AS;  lot number [id number]; expires 06/30/2020 Regarding contraindications to an Influenza vaccine: Denies moderate/severe illness with/without fever; serious reaction to eggs, egg proteins, gentamicin, gelatin, arginine, neomycin or polymixin; serious reaction after recieving previous influenza vaccines; and history of Guillain[place] Syndrome.              ASSESSMENT           V70.0   Z00.00  Health checkup              DDx:     V79.0   Z13.89  Screening for depression              DDx:     723.1   M54.2   M54.12  Neck pain              DDx:     V58.69   Z79.891  Use of high risk medications              DDx:      414.01   I25.10  CAD              DDx:     244.9   E03.9  Hypothyroidism              DDx:     V04.81   Z23  Vaccination against other viral diseases, Influenza              DDx:         ORDERS:         Radiology/Test Orders:       3017F  Colorectal CA screen results documented and reviewed (PV)  (In-House)         07935  Magnetic resonance imaging, spinal canal and contents, cervical; without contrast  (Send-Out)         3014F  Screening mammography results documented and reviewed (PV)1  (In-House)           Lab Orders:       00919  TSH - H TSH  (Send-Out)         32327  HTNLP - [facility] CMP AND LIPID: [id number]  (Send-Out)         41205  Drug test prsmv qual dir optical obs per day  (In-House)         APPTO  Appointment need  (In-House)         82531  NEURU - [facility] GAPAPENTIN CONFIRMATION  (Send-Out)         65832  TRAMU - [facility] 28071 TRAMADOL AND METABOLITES  (Send-Out)           Procedures Ordered:         Annual wellness visit, includes a PPPS, subsequent visit  (In-House)           Other Orders:         Depression screen positive and follow up plan documented  (In-House)         1101F  Pt screen for fall risk; document no falls in past year or only 1 fall w/o injury in past year (EMMETT)  (In-House)         80016  Influenza virus vaccine, quadrivalent, split virus, preservative free 3 years of age & older  (In-House)           Administration of influenza virus vaccine (x1)                 PLAN:          Health checkup She is UTD on colonoscopy, last done 3/0215 and this was normal.  Pap smear is no longer indicated by age and history; s/p hysterectomy.  She is reportedly UTD on mammogram, last done 7/2019 at [facility] ordered by Dr. Bryant and this was normal.  She is UTD on DEXA, last done 1/2019 and this showed osteopenia.  She is due for Shingrix, Havrix, Td and flu; flu shot given today.  Others can be done at the pharmacy.  She is UTD on HTN panel and TSH.  No fall risk, no memory issues, no  signs/symptoms of depression.  She lives with her .  She is able to drive and perform ADLs/manage finances independently.  Hearing is adequate.  She does not have a living will.  Preventive services handout and safety handout were given to her.  Current doctor list updated.  RTC 3 months.     MIPS PHQ-9 Depression Screening: Completed form scanned and in chart; Total Score 8 Positive Depression Screen: Stable on medications. No suicidal ideation.      FOLLOW-UP: Schedule a follow-up visit in 3 months.:.  f/u generalized OA with [place]           Orders:         Annual wellness visit, includes a PPPS, subsequent visit  (In-House)           Depression screen positive and follow up plan documented  (In-House)         1101F  Pt screen for fall risk; document no falls in past year or only 1 fall w/o injury in past year (EMMETT)  (In-House)         3017F  Colorectal CA screen results documented and reviewed (PV)  (In-House)         APPTO  Appointment need  (In-House)         3014F  Screening mammography results documented and reviewed (PV)1  (In-House)            Neck pain She has tried conservative therapy with tramadol and gabapentin as well as stretching without relief.  Pain is worsening.  She has completed PT in the past but without any benefit; in fact, the therapy seemed to make the pain worse.  We are going to work on getting her in with Pain Management at [facility].  Obtaining MRI C-spine without contrast prior to that referral.         RADIOLOGY:  I have ordered MRI cervical spine w/o contrast to be done today.            Orders:       [id number]  Magnetic resonance imaging, spinal canal and contents, cervical; without contrast  (Send-Out)            Use of high risk medications     LABORATORY:  Labs ordered to be performed today include Drug Screen Urine [facility] Confirmation GAPABENTIN TRAMADOL AND METABOLITES.  Controlled substance documentation: Ugo reviewed; drug screen performed and appropriate;  consent is reviewed and signed and on the chart.  She is aware of risk of addiction on this medication, understands that she will need to follow up for a review every 3 months and her medications will be adjusted or decreased as deemed appropriate at each visit.  No history of drug or alcohol abuse.  No concerns about diversion or abuse. She denies side effects related to the medication.  She is aware that she may be called in for pill counts.  The dosing of this medication will be reviewed on a regular basis and reduced if possible..  Ongoing use of a controlled substance is necessary for this patient to have a normal quality of life           Orders:       64197  Drug test prsmv qual dir optical obs per day  (In-House)         13544  NEURU - [facility] GAPAPENTIN CONFIRMATION  (Send-Out)         84406  TRAMU - [facility] 44112 TRAMADOL AND METABOLITES  (Send-Out)            CAD     LABORATORY:  Labs ordered to be performed today include HTN/Lipid Panel: CMP, Lipid.            Orders:       75925  HTNLP - [facility] CMP AND LIPID: [id number], [id number]  (Send-Out)            Hypothyroidism     LABORATORY:  Labs ordered to be performed today include TSH.            Orders:       36671  TSH - [facility] TSH  (Send-Out)            Vaccination against other viral diseases, Influenza         IMMUNIZATIONS given today: Influenza Quadrivalent psv free shot 3 & up.            Orders:       98032  Influenza virus vaccine, quadrivalent, split virus, preservative free 3 years of age & older  (In-House)                     Administration of influenza virus vaccine (x1)             Patient Recommendations:        For  Health checkup:     Schedule a follow-up visit in 3 months.                APPOINTMENT INFORMATION:        Monday Tuesday Wednesday Thursday Friday Saturday Sunday            Time:___________________AM  PM   Date:_____________________             CHARGE CAPTURE           **Please note: ICD descriptions below are intended for billing  purposes only and may not represent clinical diagnoses**        Primary Diagnosis:         V70.0 Health checkup            Z00.00    Encounter for general adult medical examination without abnormal findings              Orders:             Annual wellness visit, includes a PPPS, subsequent visit  (In-House)                Depression screen positive and follow up plan documented  (In-House)             1101F   Pt screen for fall risk; document no falls in past year or only 1 fall w/o injury in past year (EMMETT)  (In-House)             3017F   Colorectal CA screen results documented and reviewed (PV)  (In-House)             APPTO   Appointment need  (In-House)             3014F   Screening mammography results documented and reviewed (PV)1  (In-House)           V79.0 Screening for depression            Z13.89    Encounter for screening for other disorder    723.1 Neck pain            M54.2    Cervicalgia           M54.12    Radiculopathy, cervical region    V58.69 Use of high risk medications            Z79.891    Long term (current) use of opiate analgesic              Orders:          73364   Drug test prsmv qual dir optical obs per day  (In-House)           414.01 CAD            I25.10    Atherosclerotic heart disease of native coronary artery without angina pectoris    244.9 Hypothyroidism            E03.9    Hypothyroidism, unspecified    V04.81 Vaccination against other viral diseases, Influenza            Z23    Encounter for immunization              Orders:          94398   Influenza virus vaccine, quadrivalent, split virus, preservative free 3 years of age & older  (In-House)                                           Administration of influenza virus vaccine (x1)

## 2021-05-18 NOTE — PROGRESS NOTES
Addie Yates 1960     Office/Outpatient Visit    Visit Date: Wed, Jul 3, 2019 02:42 pm    Provider: Jammie Phillips MD (Assistant: Irais Arrieta MA)    Location: [facility]        Electronically signed by Jammie Phillips MD on  07/03/2019 04:53:20 PM                             SUBJECTIVE:        CC:     Ms. Yates is a 59 year old White female.  This is a follow-up visit.          HPI: Addie is here for routine follow-up of chronic issues.        She is on tramadol and gabapentin for chronic neck and back pain d/t generalized OA and scoliosis.  She is on gabapentin 300 mg daily.  She uses the tramadol 1-3 times weekly for breakthrough pain.  She actually has not been using this much at all recently.  Pain is well controlled.  No adverse effects.          She is on lisinopril, atorvastatin, and ASA/Plavix for CAD s/p stenting in 11/2015.  She follows with Dr. Ernandez.  No CP, palpitations, SOB.        She is on levothyroxine for hypothyroidism.  No heat/cold intolerance, no changes in hair or skin.        She is on ranitidine and pantoprazole for GERD and esophageal spasm.  She follows with Dr. Leroy.  Her last EGD showed hiatal hernia and gastritis.  Gastric emptying study has shown gastroparesis.        She is on Prolia for osteoporosis.  Last DEXA done 1/2019 showed osteopenia.          She is on Lexapro for anxiety.          Patient presents with screening for depression.          PHQ-9 Depression Screening: Completed form scanned and in chart; Total Score 4 Alcohol Consumption Screening: Completed form scanned and in chart; Total Score 0     ROS:     CONSTITUTIONAL:  Negative for fatigue and fever.      EYES:  Negative for blurred vision.      E/N/T:  Negative for diminished hearing and nasal congestion.      CARDIOVASCULAR:  Negative for chest pain and palpitations.      RESPIRATORY:  Negative for recent cough and dyspnea.      GASTROINTESTINAL:  Positive for constipation.   Negative for  abdominal pain, diarrhea, nausea or vomiting.      MUSCULOSKELETAL:  Positive for arthralgias and back pain.   Negative for myalgias.      PSYCHIATRIC:  Positive for sleep disturbance.   Negative for anxiety, crying spells, depression, feelings of stress, anhedonia or suicidal thoughts.          PMH/FMH/SH:     Last Reviewed on 7/03/2019 02:54 PM by Jmamie Phillips    Past Medical History: R Thoracic Outlet Syndrome    R Lateral and Medial Epicondylitis     R Carpal Tunnel    Osteoarthritis    Adenomatous Colonic Polyp    Osteopenia    Constipation, severe and chronic    DDD L-spine    DDD C-spine    Hypothyroidism    Toxic Goiter    Hiatal Hernia    Glaucoma OU    Chronic Pain, Base of Skull/Uppermost Neck     Hemorrhoids                        PAST MEDICAL HISTORY             CURRENT MEDICAL PROVIDERS:    Dentist: Jaclyn    Dermatologist: Romie    Gastroenterologist: Paulette    Obstetrician/Gynecologist: Manny    Ophthalmologist: [facility]         PAST MEDICAL HISTORY                 ADVANCED DIRECTIVES: None         PREVENTIVE HEALTH MAINTENANCE             BONE DENSITY: was last done 1/16/2019 osteopenia     COLORECTAL CANCER SCREENING: Up to date (colonoscopy q10y; sigmoidoscopy q5y; Cologuard q3y) was last done 3/26/2015, Results are in chart; colonoscopy with normal results     Hepatitis C Medicare Screening: was last done 8/2017     MAMMOGRAM: Done within last 2 years and results in are chart was last done 06/2018 with normal results     PAP SMEAR: Hysterectomy 2004         PAST MEDICAL HISTORY                 ADVANCED DIRECTIVES: None         Surgical History: Bi-tubal Ligation    R Thyroid Bx    Colonoscopy with Adenoma '07, '10, '15    EGD '08, '16    Laser Rx OU     Catheterization/Stent times one 11-15            Cholecystectomy: laparoscopic; 2017;     Hysterectomy: 2004;         Family History: NO Colon, Breast, Ovarian CA    Leukemia    Kidney Cancer    Diabetes         Social History:          Marital Status:      Children: 1 child         Tobacco/Alcohol/Supplements:     Last Reviewed on 7/03/2019 02:54 PM by Jammie Phillips    Tobacco: She has a past history of cigarette smoking; quit date:  11/2004.          Substance Abuse History:     Last Reviewed on 7/03/2019 02:54 PM by Jammie Phillips        Mental Health History:     Last Reviewed on 7/03/2019 02:54 PM by Jammie Phillips        Communicable Diseases (eg STDs):     Last Reviewed on 7/03/2019 02:54 PM by Jammie Phillips            Current Problems:     Last Reviewed on 4/01/2019 02:28 PM by Jammie Phillips    Generalized osteoarthritis     Anxiety     Osteoporosis, other     Artificial lens replacement     Scoliosis     Gastroparesis     GERD     Esophageal spasm     Hiatal hernia     Use of high risk medications     Hemorrhoids, NOS     Unspecified glaucoma     Hypothyroidism     Osteopenia     Colon polyps     Osteoarthritis, NOS     CAD         Immunizations:     None        Allergies:     Last Reviewed on 4/01/2019 02:28 PM by Jammie Phillips      No Known Drug Allergies.         Current Medications:     Last Reviewed on 4/01/2019 02:28 PM by Jammie Phillips    Gabapentin 300mg Capsules 1 capsule daily     Synthroid 0.1mg Tablet 1 tab daily     Polyethylene Glycol 3350  Powder for Oral Solution 1 capful (17g) daily, mixed in 4-8oz of liquid daily     Tramadol 50mg Tablet 1 po BID PRN     Atorvastatin Calcium 80mg Tablet 1 tab hs     Aspirin (ASA) 81mg Tablets, Enteric Coated 1 tab daily     Lisinopril 5mg Tablet 1 tab daily     Prolia 60mg/1ml Injection give SQ q 6months     Lexapro 5mg Tablet 1 tab po QD     Gas-X     Colace 100mg Capsules Take 2 capsule BID for constipation     OTC Calcium 1200mg/Vitamin D3 1000IU daily         OBJECTIVE:        Vitals:         Current: 7/3/2019 2:52:01 PM    Ht:  5 ft, 3.5 in;  Wt: 132.8 lbs;  BMI: 23.2    T: 98.6 F (oral);  BP: 110/60 mm Hg (left arm, sitting);  P: 54 bpm (left arm (BP Cuff),  sitting);  sCr: 0.92 mg/dL;  GFR: 60.99        Exams:     PHYSICAL EXAM:     GENERAL: vital signs recorded - well developed, well nourished;  well groomed;  no apparent distress;     EYES: extraocular movements intact; conjunctiva and cornea are normal; PERRLA;     E/N/T: OROPHARYNX: cold sore L upper lateral lip;     RESPIRATORY: normal respiratory rate and pattern with no distress; normal breath sounds with no rales, rhonchi, wheezes or rubs;     CARDIOVASCULAR: normal rate; rhythm is regular;  no systolic murmur; no edema;     GASTROINTESTINAL: nontender; normal bowel sounds;     MUSCULOSKELETAL: normal gait; normal overall tone     PSYCHIATRIC: appropriate affect and demeanor; normal psychomotor function; normal speech pattern;         Lab/Test Results:             Urine temperature:  confirmed (07/03/2019),     All urine drug screen levels confirmed negative:  yes (07/03/2019),     Date and time of last pill:  gabapentin -7/2/19 @ 10pm, tramadol - 2 weeks/amyh (07/03/2019),     Performed by:  cha (07/03/2019),     Collection Time:  1523 (07/03/2019),             ASSESSMENT           715.98   M15.0  Osteoarthritis, NOS              DDx:     V58.69   Z79.891  Use of high risk medications              DDx:     414.01   I25.10  CAD              DDx:     244.9   E03.9  Hypothyroidism              DDx:     300.02   F41.9  Anxiety              DDx:     530.81   K21.9  GERD              DDx:     V79.0   Z13.89  Screening for depression              DDx:     V79.0   Z13.89  Screening for depression              DDx:         ORDERS:         Meds Prescribed:       Refill of: Tramadol 50mg Tablet 1 po BID PRN  #60 (Sixty) tablet(s) Refills: 2         Radiology/Test Orders:       3014F  Screening mammography results documented and reviewed (PV)1  (In-House)         3017F  Colorectal CA screen results documented and reviewed (PV)  (In-House)           Lab Orders:       APPTO  Appointment need  (In-House)         74580  Drug  test prsmv qual dir optical obs per day  (In-House)           Other Orders:         Depression screen negative  (In-House)           Negative EtOH screen  (In-House)                   PLAN:          Osteoarthritis, NOS She is stable on her current regimen.  Pain is well controlled.  No adverse effects.  She does require ongoing use of this controlled substance to function.  Tox screen and Ugo run today.  No refills needed today.  RTC 3 months for AWV.     MIPS PHQ-9 Depression Screening: Completed form scanned and in chart; Total Score 4; Negative Depression Screen Negative alcohol screen     FOLLOW-UP: Schedule a follow-up visit in 3 months..  Medicare wellness 30 min with Jacqueline           Prescriptions:       Refill of: Tramadol 50mg Tablet 1 po BID PRN  #60 (Sixty) tablet(s) Refills: 2           Orders:       APPTO  Appointment need  (In-House)         3014F  Screening mammography results documented and reviewed (PV)1  (In-House)         3017F  Colorectal CA screen results documented and reviewed (PV)  (In-House)           Depression screen negative  (In-House)           Negative EtOH screen  (In-House)            Use of high risk medications     Controlled substance documentation: Ugo reviewed; drug screen performed and appropriate; consent is reviewed and signed and on the chart.  She is aware of risk of addiction on this medication, understands that she will need to follow up for a review every 3 months and her medications will be adjusted or decreased as deemed appropriate at each visit.  No history of drug or alcohol abuse.  No concerns about diversion or abuse. She denies side effects related to the medication.  She is aware that she may be called in for pill counts.  The dosing of this medication will be reviewed on a regular basis and reduced if possible..  Ongoing use of a controlled substance is necessary for this patient to have a normal quality of life           Orders:        91968  Drug test prsmv qual dir optical obs per day  (In-House)            CAD Stable.  Follows with Dr. Ernandez.  No refills needed.  Labs UTD.          Hypothyroidism Stable.  No refills needed.   Labs UTD.          Anxiety Doing much better on Lexapro.  Still having trouble with insomnia.  Discussed behavioral modifications and she will try these at home.          GERD Stable.  No refills needed.             Patient Recommendations:        For  Osteoarthritis, NOS:     Schedule a follow-up visit in 3 months.                APPOINTMENT INFORMATION:        Monday Tuesday Wednesday Thursday Friday Saturday Sunday            Time:___________________AM  PM   Date:_____________________             CHARGE CAPTURE           **Please note: ICD descriptions below are intended for billing purposes only and may not represent clinical diagnoses**        Primary Diagnosis:         715.98 Osteoarthritis, NOS            M15.0    Primary generalized (osteo)arthritis              Orders:          03331   Office/outpatient visit; established patient, level 4  (In-House)             APPTO   Appointment need  (In-House)             3014F   Screening mammography results documented and reviewed (PV)1  (In-House)             3017F   Colorectal CA screen results documented and reviewed (PV)  (In-House)                Depression screen negative  (In-House)                Negative EtOH screen  (In-House)           V58.69 Use of high risk medications            Z79.891    Long term (current) use of opiate analgesic              Orders:          81602   Drug test prsmv qual dir optical obs per day  (In-House)           414.01 CAD            I25.10    Atherosclerotic heart disease of native coronary artery without angina pectoris    244.9 Hypothyroidism            E03.9    Hypothyroidism, unspecified    300.02 Anxiety            F41.9    Anxiety disorder, unspecified    530.81 GERD            K21.9    Gastro-esophageal reflux  disease without esophagitis    V79.0 Screening for depression            Z13.89    Encounter for screening for other disorder    V79.0 Screening for depression            Z13.89    Encounter for screening for other disorder

## 2021-05-18 NOTE — PROGRESS NOTES
Addie Yates S  1960     Office/Outpatient Visit    Visit Date: Thu, Jul 9, 2020 09:33 am    Provider: Jammie Phillips MD (Assistant: Amy Sotelo MA)    Location: [facility]        Electronically signed by Jammie Phillips MD on  07/09/2020 10:23:24 AM                             Subjective:        CC: Ms. Yates is a 60 year old White female.  This is a follow-up visit.  3 month check up; pt says gabapentin is now bid; audio only [phone]        HPI: Addie's telehealth visit today is for follow up of chronic issues.        She is on tramadol and gabapentin for chronic neck and back pain d/t generalized OA and scoliosis.  She takes the gabapentin 300 mg BID and the tramadol about once daily.  No adverse effects.  She is following with [facility].  She has been taking meloxicam but is not sure it is doing anything.  She is getting shots but not helping.  Getting set up with a new pain specialist within [facility] on July 30th.        She is on Lexapro for anxiety.  Fairly well controlled.  No panic attacks.          She is on lisinopril, atorvastatin, and ASA/Plavix for CAD s/p stenting in 11/2015.  She follows with Wayne Daniels.  No CP, palpitations, SOB.  Stress test back in November looked good.        She is on levothyroxine for hypothyroidism.  No heat/cold intolerance, no changes in hair or skin.        She is on pantoprazole for GERD and esophageal spasm.  She follows with Dr. Leroy.  Her last EGD showed hiatal hernia and gastritis.  Gastric emptying study has shown gastroparesis.  She is now on Colace, Miralax, and lactulose for constipation.        She is on Prolia for osteoporosis.  Last DEXA done 1/2019 showed osteopenia.  Dr. Bryant has been ordering but she would like to get it set up through us now instead.  Next shot is due in August.        She is due for 3D mammo in August.    ROS:     CONSTITUTIONAL:  Negative for fatigue and fever.       EYES:  Negative for blurred vision.      E/N/T:  Negative for diminished hearing and nasal congestion.      CARDIOVASCULAR:  Negative for chest pain and palpitations.      RESPIRATORY:  Negative for recent cough and dyspnea.      GASTROINTESTINAL:  Positive for constipation.   Negative for abdominal pain, diarrhea, nausea or vomiting.      MUSCULOSKELETAL:  Positive for arthralgias, back pain and limb pain ( left upper extremity pain ).   Negative for myalgias.      PSYCHIATRIC:  Positive for sleep disturbance.   Negative for anxiety, crying spells, depression, feelings of stress, anhedonia or suicidal thoughts.          Past Medical History / Family History / Social History:         Last Reviewed on 7/09/2020 09:59 AM by Jammie Phillips    Past Medical History: R Thoracic Outlet Syndrome    R Lateral and Medial Epicondylitis     R Carpal Tunnel    Osteoarthritis    Adenomatous Colonic Polyp    Osteopenia    Constipation, severe and chronic    DDD L-spine    DDD C-spine    Hypothyroidism    Toxic Goiter    Hiatal Hernia    Glaucoma OU    Chronic Pain, Base of Skull/Uppermost Neck     Hemorrhoids                        PAST MEDICAL HISTORY             CURRENT MEDICAL PROVIDERS:    Dentist: Jaclyn    Dermatologist: Romie    Gastroenterologist: Paulette    Obstetrician/Gynecologist: Manny    Ophthalmologist: [facility]         PAST MEDICAL HISTORY                 ADVANCED DIRECTIVES: None         PREVENTIVE HEALTH MAINTENANCE             BONE DENSITY: was last done 1/16/2019 osteopenia     COLORECTAL CANCER SCREENING: Up to date (colonoscopy q10y; sigmoidoscopy q5y; Cologuard q3y) was last done 3/26/2015, Results are in chart; colonoscopy with normal results     Hepatitis C Medicare Screening: was last done 8/2017     MAMMOGRAM: Done within last 2 years and results in are chart was last done 7/29/2019 with normal results     PAP SMEAR: Hysterectomy 2004         PAST MEDICAL HISTORY                 ADVANCED  DIRECTIVES: None         Surgical History: Bi-tubal Ligation    R Thyroid Bx    Colonoscopy with Adenoma '07, '10, '15    EGD '08, '16    Laser Rx OU     Catheterization/Stent times one 11-15            Cholecystectomy: laparoscopic; 2017;     Hysterectomy: 2004;         Family History: NO Colon, Breast, Ovarian CA    Leukemia    Kidney Cancer    Diabetes         Social History:         Marital Status:      Children: 1 child         Tobacco/Alcohol/Supplements:     Last Reviewed on 7/09/2020 09:59 AM by Jammie Phillips    Tobacco: She has a past history of cigarette smoking; quit date:  11/2004.          Substance Abuse History:     Last Reviewed on 7/09/2020 09:59 AM by Jammie Phillips        Mental Health History:     Last Reviewed on 7/09/2020 09:59 AM by Jammie Phillips        Communicable Diseases (eg STDs):     Last Reviewed on 7/09/2020 09:59 AM by Jammie Phillips        Current Problems:     Last Reviewed on 4/15/2020 10:06 AM by Jammie Phillips    CAD - Atherosclerotic heart disease of native coronary artery without angina pectoris    Hypothyroidism, unspecified    Primary generalized (osteo)arthritis    Long term (current) use of opiate analgesic    Esophageal spasm - Dyskinesia of esophagus    Hiatal hernia - Diaphragmatic hernia without obstruction or gangrene    GERD - Gastro-esophageal reflux disease without esophagitis    Gastroparesis    Presence of intraocular lens    Scoliosis, site unspecified, Other idiopathic    Anxiety disorder, unspecified    Unspecified open-angle glaucoma, stage unspecified    Osteoporosis without current pathological fracture, age-related    Unspecified hemorrhoids    Constipation, unspecified        Immunizations:     Fluzone Quadrivalent (3+ years) 10/8/2019        Allergies:     Last Reviewed on 4/15/2020 10:06 AM by Jammie Phillips    No Known Allergies.        Current Medications:     Last Reviewed on 4/15/2020 10:06 AM by Jammie Phillips    Atorvastatin  Calcium 80mg Tablet [1 tab hs]    Polyethylene Glycol 3350  Powder for Oral Solution [1 capful (17g) daily, mixed in 4-8oz of liquid daily]    levothyroxine 100 mcg oral tablet [TAKE 1 TABLET BY MOUTH EVERY DAY]    Lisinopril 5 mg oral tablet [1 tab daily]    aspirin 81 mg oral tablet, delayed release (enteric coated) [1 tab daily]    Prolia 60 mg/mL subcutaneous Syringe [give SQ q 6months]    traMADol 50 mg oral tablet [TAKE 1 TABLET BY MOUTH TWICE DAILY AS NEEDED]    gabapentin 300 mg oral capsule [TAKE 1 CAPSULE BY MOUTH EVERY DAY]    OTC Calcium 1200mg/Vitamin D3 1000IU daily     Colace 100 mg oral capsule [Take 2 capsule BID for constipation]    Gas-X     escitalopram oxalate 10 mg oral tablet [take 1 tablet (10 mg) by oral route once daily]    Protonix 40 mg oral tablet, delayed release (enteric coated) [1 tab daily]    Constulose 10 gram/15 mL oral Solution [Take 2 Tablespoon by mouth daily]    Meloxicam 15mg  [TAKE ONE TABLET BY MOUTH EVERY DAY]        Assessment:         M15.0   Primary generalized (osteo)arthritis       Z79.891   Long term (current) use of opiate analgesic       I25.10   CAD - Atherosclerotic heart disease of native coronary artery without angina pectoris       E03.9   Hypothyroidism, unspecified       F41.9   Anxiety disorder, unspecified       K21.9   GERD - Gastro-esophageal reflux disease without esophagitis           ORDERS:         Meds Prescribed:       [Refilled] traMADol 50 mg oral tablet [TAKE 1 TABLET BY MOUTH TWICE DAILY AS NEEDED], #60 (sixty) each, Refills: 2 (two)         Radiology/Test Orders:       3017F  Colorectal CA screen results documented and reviewed (PV)  (In-House)              Lab Orders:       APPTO  Appointment need  (In-House)            24886  TSH - H TSH  (Send-Out)            85186  HTNLP - [facility] CMP AND LIPID: [id number], [id number]  (Send-Out)              Other Orders:         Screening mammogram results documented  (Send-Out)                      Plan:          Primary generalized (osteo)arthritisStable on current regimen.  Sx well controlled.  No adverse effects.  She does require ongoing use of this controlled substance to function.  Prior tox screen appropriate.  Will have her come in by the end of business tomorrow to get her tox screen.  Ugo run today.  Refills sent.  RTC 3 months.    MIPS Vaccines Flu and Pneumonia updated in Shot record Screening mammomgram done within last 2 years and results in are chart Colorectal Cancer Screening is up to date and the results are in the chart Telehealth: Verbal consent obtained for visit to occur via phone call; Staff, other than provider, present during telephone visit include Amy Sotelo MA; Total time spent was 15 minutes; [id number]--Gfncgaagi E/M 11-20 minutes     FOLLOW-UP: Schedule a follow-up visit in 3 months.:.  Medicare wellness 30 min with Jacqueline          Prescriptions:       [Refilled] traMADol 50 mg oral tablet [TAKE 1 TABLET BY MOUTH TWICE DAILY AS NEEDED], #60 (sixty) each, Refills: 2 (two)           Orders:         Screening mammogram results documented  (Send-Out)            3017F  Colorectal CA screen results documented and reviewed (PV)  (In-House)            APPTO  Appointment need  (In-House)              Long term (current) use of opiate analgesic    Controlled substance documentation: Ugo reviewed; prior drug screen consistent; consent is reviewed and signed and on the chart.  She is aware of risk of addiction on this medication, understands that she will need to follow up for a review every 3 months and her medications will be adjusted or decreased as deemed appropriate at each visit.  No history of drug or alcohol abuse.  No concerns about diversion or abuse. She denies side effects related to the medication.  She is aware that she may be called in for pill counts.  The dosing of this medication will be reviewed on a regular basis and reduced if possible..  Ongoing use of a controlled  substance is necessary for this patient to have a normal quality of life         CAD - Atherosclerotic heart disease of native coronary artery without angina pectorisStable.  Checking labs.  No refills needed.    LABORATORY:  Labs ordered to be performed today include HTN/Lipid Panel: CMP, Lipid.            Orders:       [id number]  HTNLP - [facility] CMP AND LIPID: [id number], [id number]  (Send-Out)              Hypothyroidism, unspecifiedStable.  Checking labs.  No refills needed.    LABORATORY:  Labs ordered to be performed today include TSH.            Orders:       [id number]  TSH - [facility] TSH  (Send-Out)              Anxiety disorder, unspecifiedStable.  No refills needed.        GERD - Gastro-esophageal reflux disease without esophagitisStable.  No refills needed.            Patient Recommendations:        For  Primary generalized (osteo)arthritis:    Schedule a follow-up visit in 3 months.                APPOINTMENT INFORMATION:        Monday Tuesday Wednesday Thursday Friday Saturday Sunday            Time:___________________AM  PM   Date:_____________________             Charge Capture:         Primary Diagnosis:     M15.0  Primary generalized (osteo)arthritis           Orders:      3017F  Colorectal CA screen results documented and reviewed (PV)  (In-House)            APPTO  Appointment need  (In-House)            38442  Phys/QHP telephone evaluation 11-20 minutes  (In-House)              Z79.891  Long term (current) use of opiate analgesic     I25.10  CAD - Atherosclerotic heart disease of native coronary artery without angina pectoris     E03.9  Hypothyroidism, unspecified     F41.9  Anxiety disorder, unspecified     K21.9  GERD - Gastro-esophageal reflux disease without esophagitis

## 2021-05-18 NOTE — PROGRESS NOTES
Addie Yates S  1960     Office/Outpatient Visit    Visit Date: Wed, Apr 15, 2020 09:47 am    Provider: Jammie Phillips MD (Assistant: Eugenia Oakes MA)    Location: [facility]        Electronically signed by Jammie Phillips MD on  04/15/2020 10:25:09 AM                             Subjective:        CC: Ms. Yates is a 60 year old White female.  Three month follow up;         HPI: Addie's telehealth visit today is for f/u of chronic issues.        She is on tramadol and gabapentin for chronic neck and back pain d/t generalized OA and scoliosis.  She takes the gabapentin 300 mg daily and the tramadol about once daily.  No adverse effects.  She has gotten established with [facility].  He has increased her gabapentin to 300 mg BID and started her on meloxicam.  She has not noticed a lot of benefit so far.  She also got an epidural on 3/24/20.  No improvement there unfortunately.  Going back in June.        She is on Lexapro for anxiety.  This seems to be doing better.          She is on lisinopril, atorvastatin, and ASA/Plavix for CAD s/p stenting in 11/2015.  She follows with Wayne Daniels.  No CP, palpitations, SOB.  She had a recent treadmill stress test in Nov 2019 and got a good report from that.        She is on levothyroxine for hypothyroidism.  No heat/cold intolerance, no changes in hair or skin.        She is on pantoprazole for GERD and esophageal spasm.  She follows with Dr. Leroy.  Her last EGD showed hiatal hernia and gastritis.  Gastric emptying study has shown gastroparesis.  She is now on Colace, Miralax, and lactulose for constipation.        She is on Prolia for osteoporosis.  Last DEXA done 1/2019 showed osteopenia.    ROS:     CONSTITUTIONAL:  Negative for fatigue and fever.      EYES:  Negative for blurred vision.      E/N/T:  Negative for diminished hearing and nasal congestion.      CARDIOVASCULAR:  Negative for chest pain and palpitations.      RESPIRATORY:   Negative for recent cough and dyspnea.      GASTROINTESTINAL:  Positive for constipation.   Negative for abdominal pain, diarrhea, nausea or vomiting.      MUSCULOSKELETAL:  Positive for arthralgias, back pain and limb pain ( left upper extremity pain ).   Negative for myalgias.      PSYCHIATRIC:  Positive for sleep disturbance.   Negative for anxiety, crying spells, depression, feelings of stress, anhedonia or suicidal thoughts.          Past Medical History / Family History / Social History:         Last Reviewed on 4/15/2020 10:06 AM by Jammie Phillips    Past Medical History: R Thoracic Outlet Syndrome    R Lateral and Medial Epicondylitis     R Carpal Tunnel    Osteoarthritis    Adenomatous Colonic Polyp    Osteopenia    Constipation, severe and chronic    DDD L-spine    DDD C-spine    Hypothyroidism    Toxic Goiter    Hiatal Hernia    Glaucoma OU    Chronic Pain, Base of Skull/Uppermost Neck     Hemorrhoids                        PAST MEDICAL HISTORY             CURRENT MEDICAL PROVIDERS:    Dentist: Jaclyn    Dermatologist: Romie    Gastroenterologist: Paulette    Obstetrician/Gynecologist: Manny    Ophthalmologist: [facility]         PAST MEDICAL HISTORY                 ADVANCED DIRECTIVES: None         PREVENTIVE HEALTH MAINTENANCE             BONE DENSITY: was last done 1/16/2019 osteopenia     COLORECTAL CANCER SCREENING: Up to date (colonoscopy q10y; sigmoidoscopy q5y; Cologuard q3y) was last done 3/26/2015, Results are in chart; colonoscopy with normal results     Hepatitis C Medicare Screening: was last done 8/2017     MAMMOGRAM: Done within last 2 years and results in are chart was last done 7/29/2019 with normal results     PAP SMEAR: Hysterectomy 2004         PAST MEDICAL HISTORY                 ADVANCED DIRECTIVES: None         Surgical History: Bi-tubal Ligation    R Thyroid Bx    Colonoscopy with Adenoma '07, '10, '15    EGD '08, '16    Laser Rx OU     Catheterization/Stent times one  11-15            Cholecystectomy: laparoscopic; 2017;     Hysterectomy: 2004;         Family History: NO Colon, Breast, Ovarian CA    Leukemia    Kidney Cancer    Diabetes         Social History:         Marital Status:      Children: 1 child         Tobacco/Alcohol/Supplements:     Last Reviewed on 4/15/2020 10:06 AM by Jammie Phillips    Tobacco: She has a past history of cigarette smoking; quit date:  11/2004.          Substance Abuse History:     Last Reviewed on 4/15/2020 10:06 AM by Jammie Phillips        Mental Health History:     Last Reviewed on 4/15/2020 10:06 AM by Jammie Phillips        Communicable Diseases (eg STDs):     Last Reviewed on 4/15/2020 10:06 AM by Jammie Phillips        Current Problems:     Last Reviewed on 1/08/2020 10:09 AM by Jammie Phillips    CAD - Atherosclerotic heart disease of native coronary artery without angina pectoris    Primary generalized (osteo)arthritis    Hypothyroidism, unspecified    Long term (current) use of opiate analgesic    Esophageal spasm - Dyskinesia of esophagus    Hiatal hernia - Diaphragmatic hernia without obstruction or gangrene    GERD - Gastro-esophageal reflux disease without esophagitis    Gastroparesis    Scoliosis, site unspecified, Other idiopathic    Presence of intraocular lens    Anxiety disorder, unspecified    Unspecified open-angle glaucoma, stage unspecified    Unspecified hemorrhoids    Osteoporosis without current pathological fracture, age-related        Immunizations:     Fluzone Quadrivalent (3+ years) 10/8/2019        Allergies:     Last Reviewed on 4/15/2020 09:47 AM by Eugenia Oakes    No Known Allergies.        Current Medications:     Last Reviewed on 4/15/2020 09:47 AM by Eugenia Oakes    Atorvastatin Calcium 80mg Tablet [1 tab hs]    Polyethylene Glycol 3350  Powder for Oral Solution [1 capful (17g) daily, mixed in 4-8oz of liquid daily]    levothyroxine 100 mcg oral tablet [TAKE 1 TABLET BY MOUTH EVERY DAY]     Lisinopril 5 mg oral tablet [1 tab daily]    aspirin 81 mg oral tablet, delayed release (enteric coated) [1 tab daily]    Prolia 60 mg/mL subcutaneous Syringe [give SQ q 6months]    traMADol 50 mg oral tablet [TAKE 1 TABLET BY MOUTH TWICE DAILY AS NEEDED]    gabapentin 300 mg oral capsule [TAKE 1 CAPSULE BY MOUTH EVERY DAY]    OTC Calcium 1200mg/Vitamin D3 1000IU daily     Colace 100 mg oral capsule [Take 2 capsule BID for constipation]    Gas-X     escitalopram oxalate 10 mg oral tablet [take 1 tablet (10 mg) by oral route once daily]    Protonix 40 mg oral tablet, delayed release (enteric coated) [1 tab daily]    Constulose 10 gram/15 mL oral Solution [Take 2 Tablespoon by mouth daily]    Meloxicam 15mg [TAKE ONE TABLET BY MOUTH EVERY DAY]        Assessment:         M15.0   Primary generalized (osteo)arthritis       Z79.891   Long term (current) use of opiate analgesic       E03.9   Hypothyroidism, unspecified       F41.9   Anxiety disorder, unspecified       K21.9   GERD - Gastro-esophageal reflux disease without esophagitis       K59.00   Constipation, unspecified       M81.0   Osteoporosis without current pathological fracture, age-related           ORDERS:         Meds Prescribed:       [Refilled] escitalopram oxalate 10 mg oral tablet [take 1 tablet (10 mg) by oral route once daily], #90 (ninety) tablets, Refills: 1 (one)       [Refilled] levothyroxine 100 mcg oral tablet [TAKE 1 TABLET BY MOUTH EVERY DAY], #90 (ninety) tablets, Refills: 1 (one)         Radiology/Test Orders:       3017F  Colorectal CA screen results documented and reviewed (PV)  (In-House)              Lab Orders:       APPTO  Appointment need  (In-House)              Other Orders:         Screening mammogram results documented  (Send-Out)            1124F  Advance Care Planning discussed and doc in MR; no surrogate named or advance care plan provided  (Send-Out)                      Plan:         Primary generalized  (osteo)arthritisStable on current regimen.  Sx well controlled.  No adverse effects.  She does require ongoing use of this controlled substance to function.  Prior tox screen appropriate; no tox run today d/t COVID-19.  Ugo run today.  No refills needed.  She has gotten set up with [facility] and is doing epidurals there.  Additionally, the Pain physician has taken over her gabapentin but as of right now, I am still prescribing her tramadol.  She will clarify with him next time she goes in whether he wants me to continue doing that.  RTC 3 months.    MIPS Smoking cessation encouraged. Counseling for less than 3 minutes.  Telehealth: Verbal consent obtained for visit to occur via phone call; Staff, other than provider, present during telephone visit include Eugenia Oakes MA; Total time spent was 10 minutes; 15651--Mgibrbodd E/M 5-10 minutes     FOLLOW-UP: Schedule a follow-up visit in 3 months.:.  f/u OA with [facility]          Orders:       APPTO  Appointment need  (In-House)              Screening mammogram results documented  (Send-Out)            3017F  Colorectal CA screen results documented and reviewed (PV)  (In-House)            1124F  Advance Care Planning discussed and doc in MR; no surrogate named or advance care plan provided  (Send-Out)              Long term (current) use of opiate analgesic    Controlled substance documentation: Uog reviewed; prior drug screen consistent; consent is reviewed and signed and on the chart.  She is aware of risk of addiction on this medication, understands that she will need to follow up for a review every 3 months and her medications will be adjusted or decreased as deemed appropriate at each visit.  No history of drug or alcohol abuse.  No concerns about diversion or abuse. She denies side effects related to the medication.  She is aware that she may be called in for pill counts.  The dosing of this medication will be reviewed on a regular basis and  reduced if possible..  Ongoing use of a controlled substance is necessary for this patient to have a normal quality of life         Hypothyroidism, unspecifiedRx sent.  Labs deferred.          Prescriptions:       [Refilled] levothyroxine 100 mcg oral tablet [TAKE 1 TABLET BY MOUTH EVERY DAY], #90 (ninety) tablets, Refills: 1 (one)         Anxiety disorder, unspecifiedStable.  Rx sent.          Prescriptions:       [Refilled] escitalopram oxalate 10 mg oral tablet [take 1 tablet (10 mg) by oral route once daily], #90 (ninety) tablets, Refills: 1 (one)         GERD - Gastro-esophageal reflux disease without esophagitisStable.  No refills needed.        Constipation, unspecifiedStable.  No refills needed.        Osteoporosis without current pathological fracture, age-relatedOn Prolia.            Patient Recommendations:        For  Primary generalized (osteo)arthritis:    Schedule a follow-up visit in 3 months.                APPOINTMENT INFORMATION:        Monday Tuesday Wednesday Thursday Friday Saturday Sunday            Time:___________________AM  PM   Date:_____________________             Charge Capture:         Primary Diagnosis:     M15.0  Primary generalized (osteo)arthritis           Orders:      APPTO  Appointment need  (In-House)            3017F  Colorectal CA screen results documented and reviewed (PV)  (In-House)            [id number]  Phys/QHP telephone evaluation 5-10 min  (In-House)              Z79.891  Long term (current) use of opiate analgesic     E03.9  Hypothyroidism, unspecified     F41.9  Anxiety disorder, unspecified     K21.9  GERD - Gastro-esophageal reflux disease without esophagitis     K59.00  Constipation, unspecified     M81.0  Osteoporosis without current pathological fracture, age-related

## 2021-05-18 NOTE — PROGRESS NOTES
Addie Yates 1960     Office/Outpatient Visit    Visit Date: Wed, Jan 9, 2019 12:47 pm    Provider: Jammie Phillips MD (Assistant: Eugenia Oakes MA)    Location: [facility]        Electronically signed by Jammie Phillips MD on  01/09/2019 01:42:07 PM                             SUBJECTIVE:        CC:     Ms. Yates is a 58 year old White female.  This is a follow-up visit.  Patient presents today for three month follow up, also has complaints of increased anxiety;         HPI: Addie is here today to f/u on chronic issues.        She is having some increasing complaints of anxiety.  She is currently on Wellbutrin for decreased libido.  Dr. Bryant started her on this.        She is on tramadol and gabapentin for chronic neck pain, back pain due to scoliosis and diffuse arthritis.  She is on the gabapentin 300 mg daily and then uses the tramadol 1-3 times per week for breakthrough pain.  This regimen controls her pain fairly well.  She has been on higher doses of gabapentin in the past, but did not tolerate this well.        She is on lisinopril, atorvastatin, and ASA/Plavix for CAD s/p stenting in 11/2015.  She follows with Dr. Ernandez.  No CP, palpitations, SOB.        She is on levothyroxine for hypothyroidism.  No heat/cold intolerance, no changes in hair or skin.        She is on ranitidine and pantoprazole both for GERD and esophageal spasm.  She follows with Dr. Leroy.  Her last EGD showed hiatal hernia and gastritis.  Gastric emptying study has shown gastroparesis.  She does have some chronic constipation as well but is no longer on meds for this (has used Linzess and Amitiza in the past).        She is on Prolia for osteoporosis.  She is due for DEXA, last done 7/2016.  This showed osteopenia.     ROS:     CONSTITUTIONAL:  Negative for fatigue and fever.      EYES:  Negative for blurred vision.      E/N/T:  Negative for diminished hearing and nasal congestion.      CARDIOVASCULAR:   Negative for chest pain and palpitations.      RESPIRATORY:  Negative for recent cough and dyspnea.      GASTROINTESTINAL:  Positive for acid reflux symptoms and constipation.   Negative for abdominal pain, diarrhea, nausea or vomiting.      MUSCULOSKELETAL:  Positive for arthralgias and back pain.   Negative for myalgias.      NEUROLOGICAL:  Negative for paresthesias and weakness.      PSYCHIATRIC:  Positive for anxiety, feelings of stress, anhedonia and sleep disturbance.   Negative for crying spells or depression.          PMH/FMH/SH:     Last Reviewed on 1/09/2019 12:58 PM by Jammie Phillips    Past Medical History: R Thoracic Outlet Syndrome    R Lateral and Medial Epicondylitis     R Carpal Tunnel    Osteoarthritis    Adenomatous Colonic Polyp    Osteopenia    Constipation, severe and chronic    DDD L-spine    DDD C-spine    Hypothyroidism    Toxic Goiter    Hiatal Hernia    Glaucoma OU    Chronic Pain, Base of Skull/Uppermost Neck     Hemorrhoids                        PAST MEDICAL HISTORY             CURRENT MEDICAL PROVIDERS:    Dentist: Jaclyn    Dermatologist: Romie    Gastroenterologist: Paulette    Obstetrician/Gynecologist: Manny    Ophthalmologist: [facility]         PAST MEDICAL HISTORY                 ADVANCED DIRECTIVES: None         PREVENTIVE HEALTH MAINTENANCE             BONE DENSITY: was last done 7/18/16 osteopenia     COLORECTAL CANCER SCREENING: Up to date (colonoscopy q10y; sigmoidoscopy q5y; Cologuard q3y) was last done 3/26/2015, Results are in chart; colonoscopy with normal results     Hepatitis C Medicare Screening: was last done 8/2017     MAMMOGRAM: Done within last 2 years and results in are chart was last done 06/2018 with normal results     PAP SMEAR: Hysterectomy 2004         PAST MEDICAL HISTORY                 ADVANCED DIRECTIVES: None         Surgical History: Bi-tubal Ligation    R Thyroid Bx    Colonoscopy with Adenoma '07, '10, '15    EGD '08, '16    Laser Rx OU      Catheterization/Stent times one 11-15            Cholecystectomy: laparoscopic; 2017;     Hysterectomy: 2004;         Family History: NO Colon, Breast, Ovarian CA    Leukemia    Kidney Cancer    Diabetes         Social History:         Marital Status:      Children: 1 child         Tobacco/Alcohol/Supplements:     Last Reviewed on 1/09/2019 12:58 PM by Jammie Phillips    Tobacco: She has a past history of cigarette smoking; quit date:  11/2004.          Substance Abuse History:     Last Reviewed on 1/09/2019 12:58 PM by Jammie Phillips        Mental Health History:     Last Reviewed on 1/09/2019 12:58 PM by Jammie Phillips        Communicable Diseases (eg STDs):     Last Reviewed on 1/09/2019 12:58 PM by Jammie Phillips            Current Problems:     Last Reviewed on 10/05/2018 10:54 AM by Jammie Phillips    Artificial lens replacement     Scoliosis     Gastroparesis     GERD     Esophageal spasm     Hiatal hernia     Use of high risk medications     Hemorrhoids, NOS     Unspecified glaucoma     Hypothyroidism     Osteopenia     Colon polyps     Osteoarthritis, NOS     CAD         Immunizations:     None        Allergies:     Last Reviewed on 10/05/2018 10:54 AM by Jammie Phillips      No Known Drug Allergies.         Current Medications:     Last Reviewed on 10/05/2018 10:54 AM by Jammie Phillpis    Polyethylene Glycol 3350  Powder for Oral Solution 1 capful (17g) daily, mixed in 4-8oz of liquid daily     Synthroid 0.1mg Tablet 1 tab daily     Gabapentin 300mg Capsules 1 capsule daily     Tramadol 50mg Tablet 1 po BID PRN     Atorvastatin Calcium 80mg Tablet 1 tab hs     Plavix 75mg Tablet 1 tab daily     Aspirin (ASA) 81mg Tablets, Enteric Coated 1 tab daily     Lisinopril 5mg Tablet 1 tab daily     Prolia 60mg/1ml Injection give SQ q 6months     Pantoprazole 20mg Tablets, Delayed Release Take 1 tablet(s) by mouth daily     Gas-X     Wellbutrin XL 300mg Tablets, Extended Release 1 tab daily     Colace  100mg Capsules Take 2 capsule BID for constipation     OTC Calcium 1200mg/Vitamin D3 1000IU daily         OBJECTIVE:        Vitals:         Current: 1/9/2019 12:52:53 PM    Ht:  5 ft, 3.5 in;  Wt: 133.2 lbs;  BMI: 23.2    T: 98.1 F (oral);  BP: 111/83 mm Hg (left arm, sitting);  P: 75 bpm (left arm (BP Cuff), sitting);  sCr: 1.05 mg/dL;  GFR: 54.16        Exams:     PHYSICAL EXAM:     GENERAL: vital signs recorded - well developed, well nourished;  well groomed;  no apparent distress;     EYES: extraocular movements intact; conjunctiva and cornea are normal; PERRLA;     E/N/T: OROPHARYNX:  normal mucosa, dentition, gingiva, and posterior pharynx;     RESPIRATORY: normal respiratory rate and pattern with no distress; normal breath sounds with no rales, rhonchi, wheezes or rubs;     CARDIOVASCULAR: normal rate; rhythm is regular;  no systolic murmur; no edema;     GASTROINTESTINAL: nontender; normal bowel sounds;     MUSCULOSKELETAL: normal gait; normal overall tone     PSYCHIATRIC: appropriate affect and demeanor; normal psychomotor function; normal speech pattern;         Lab/Test Results:             Urine temperature:  confirmed (01/09/2019),     All urine drug screen levels confirmed negative:  yes (01/09/2019),     Date and time of last pill:  Gababentin 1/8/19 @830pm, Tramadol 1/9/18 @ 11am/AS (01/09/2019),     Performed by:  tls (01/09/2019),     Collection Time:  1300 (01/09/2019),             ASSESSMENT           715.98   M15.0  Osteoarthritis, NOS              DDx:     V58.69   Z79.891  Use of high risk medications              DDx:     300.02   F41.9  Anxiety              DDx:     414.01   I25.10  CAD              DDx:     244.9   E03.9  Hypothyroidism              DDx:     530.81   K21.9  GERD              DDx:     733.09   M81.8  Osteoporosis, other              DDx:         ORDERS:         Meds Prescribed:       Refill of: Tramadol 50mg Tablet 1 po BID PRN  #60 (Sixty) tablet(s) Refills: 2       Refill  of: Wellbutrin XL (Bupropion HCl) 150mg Tablets, Extended Release 1 tab daily  #14 (Fourteen) tablet(s) Refills: 0         Radiology/Test Orders:       3014F  Screening mammography results documented and reviewed (PV)1  (In-House)         3017F  Colorectal CA screen results documented and reviewed (PV)  (In-House)           Lab Orders:       68638  Drug test prsmv qual dir optical obs per day  (In-House)         APPTO  Appointment need  (In-House)                   PLAN:          Osteoarthritis, NOS Stable on tramadol and gabapentin for pain control.  The gabapentin is mostly adequate, with Addie requiring occasional doses of tramadol for breakthrough pain throughout the week.  No adverse effects.  She does require ongoing use of these controlled substances to function.  Refills sent on tramadol.  Tox screen and Ugo run.  RTC 3 months.     MIPS Vaccines Flu and Pneumonia updated in Shot record     MAMMOGRAM: Done within last 2 years and results in are chart     COLORECTAL CANCER SCREENING: Results are in chart     FOLLOW-UP: Schedule a follow-up visit in 3 months..  generalized OA with [place]           Prescriptions:       Refill of: Tramadol 50mg Tablet 1 po BID PRN  #60 (Sixty) tablet(s) Refills: 2           Orders:       3014F  Screening mammography results documented and reviewed (PV)1  (In-House)         3017F  Colorectal CA screen results documented and reviewed (PV)  (In-House)         APPTO  Appointment need  (In-House)             Patient Education Handouts:       [facility] [place] Medication Compliance           Use of high risk medications     Controlled substance documentation: Ugo reviewed; drug screen performed and appropriate; consent is reviewed and signed and on the chart.  She is aware of risk of addiction on this medication, understands that she will need to follow up for a review every 3 months and her medications will be adjusted or decreased as deemed appropriate at each visit.  No history of drug or  alcohol abuse.  No concerns about diversion or abuse. She denies side effects related to the medication.  She is aware that she may be called in for pill counts.  The dosing of this medication will be reviewed on a regular basis and reduced if possible..  Ongoing use of a controlled substance is necessary for this patient to have a normal quality of life           Orders:       94691  Drug test prsmv qual dir optical obs per day  (In-House)            Anxiety Anxiety started after Wellbutrin was initiated to help with sex drive.  She doesn't think it is helping for this purpose anyway.  Wellbutrin may be driving the anxiety as it is an activating medication, so will taper her down off this with 150 mg x 2 weeks, then stop.  Will re-evaluate in 3 months to see how she is doing once it gets out of her system.           Prescriptions:       Refill of: Wellbutrin XL (Bupropion HCl) 150mg Tablets, Extended Release 1 tab daily  #14 (Fourteen) tablet(s) Refills: 0          CAD Stable.  Follows with Wayne Daniels.  Recently had heart monitor done that was reportedly unremarkable.  No refills needed.  Labs UTD.          Hypothyroidism Stable.  No refills needed.  Labs UTD.          GERD Stable.  No efills needed.  Follows with Dr. Leroy.          Osteoporosis, other On Prolia.  Looks like she is due for DEXA, but Dr. Bryant usually orders these for her.  Will check with [facility] and [facility] to see when her last one each place was, and place order for repeat DEXA if indicated.             Patient Recommendations:        For  Osteoarthritis, NOS:     Schedule a follow-up visit in 3 months.                APPOINTMENT INFORMATION:        Monday Tuesday Wednesday Thursday Friday Saturday Sunday            Time:___________________AM  PM   Date:_____________________             CHARGE CAPTURE           **Please note: ICD descriptions below are intended for billing purposes only and may not represent clinical diagnoses**         Primary Diagnosis:         715.98 Osteoarthritis, NOS            M15.0    Primary generalized (osteo)arthritis              Orders:          22601   Office/outpatient visit; established patient, level 4  (In-House)             3014F   Screening mammography results documented and reviewed (PV)1  (In-House)             3017F   Colorectal CA screen results documented and reviewed (PV)  (In-House)             APPTO   Appointment need  (In-House)           V58.69 Use of high risk medications            Z79.891    Long term (current) use of opiate analgesic              Orders:          91057   Drug test prsmv qual dir optical obs per day  (In-House)           300.02 Anxiety            F41.9    Anxiety disorder, unspecified    414.01 CAD            I25.10    Atherosclerotic heart disease of native coronary artery without angina pectoris    244.9 Hypothyroidism            E03.9    Hypothyroidism, unspecified    530.81 GERD            K21.9    Gastro-esophageal reflux disease without esophagitis    733.09 Osteoporosis, other            M81.8    Other osteoporosis without current pathological fracture

## 2021-05-18 NOTE — PROGRESS NOTES
Addie Yates  1960     Office/Outpatient Visit    Visit Date: u, Oct 15, 2020 02:37 pm    Provider: Jammie Phillips MD (Assistant: Amy Sotelo MA)    Location: [facility]        Electronically signed by Jammie Phillips MD on  10/15/2020 03:34:43 PM                             Subjective:        CC: Medicare wellness    HPI:       She is UTD on colonoscopy, last done 9/2020 and this showed diverticulosis as well as a rectal mass.  She had that removed by a colorectal surgeon, dxed as HSIL lesion.  She is going back in 2 months with Dr. Luke to have repeat exploratory surgery.  Pap smear is no longer indicated by history; s/p hysterectomy.  She is UTD on mammogram, last done 8/2020 and this was normal.  She is UTD on DEXA, last done 1/2019 and this showed osteopenia.  She is reportedly [facility]TD [facility]  She is due for Shingrix and flu.  She is UTD on routine labs.        She is on tramadol and gabapentin for chronic neck and back pain d/t generalized OA and scoliosis.  No longer taking meloxicam; she was switched to diclofenac by [facility].  She is on gabapentin 300 mg 2x daily.  She uses the tramadol 2x daily.  [facility] has taken over those rxs.  Pain is well controlled.  No adverse effects.  She has been sent to PT for her neck but that was ineffective.  Dr. Reyes at [facility] would like her to try switching from Lexapro to Cymbalta.    Ms. Yates is here for a Medicare wellness visit.  The required HRA questions are integrated within this visit note. Family medical history and individual medical/surgical history were reviewed and updated.  A current height, weight, BMI, blood pressure, and pulse were recorded in the vitals section of the note and have been reviewed. Patient's medications, including supplements, were recorded in the chart and reviewed.  Current providers and suppliers were reviewed and updated.           Self-Assessment of Health: She rates her health as fair. She rates her confidence of being able to control/manage most of her health problems as very confident. Her physical/emotional health has limited her social activites slightly.  A review of possible cognitive impairment was performed and the following was noted: she is not having trouble driving;  memory changes are noted;  she is not having trouble with her finances A review of functional ability, including bathing, dressing, walking, and urine/bowel continence as well as level of safety was performed and was found to be negative.  Falls Risk: Has not had any falls or only one fall without injury in the past year.  She denies having trouble hearing the TV/radio when others do not, having to strain to hear or understand conversations and wearing hearing aid(s).  Concerning home safety, she reports that at home she DOES have adequate lighting, a skid resistant shower/tub, handrails on stairs, functioning smoke alarms and absence of throw rugs, but not grab bars in the bath.          Immunization Status: Age>60, no shingles vaccination; pt unsure of last tetanus; Physical Activity: She exercises for at least 20 minutes 3 or more days/week.; Type of diet patient normally eats is described as well-balanced with fruits and vegetables Tobacco: Past history of cigarette smoking, but has quit.  Preventative Health updated today           PHQ-9 Depression Screening: Completed form scanned and in chart; Total Score 9     ROS:     CONSTITUTIONAL:  Negative for fatigue and fever.      EYES:  Negative for blurred vision.      E/N/T:  Negative for diminished hearing and nasal congestion.      CARDIOVASCULAR:  Negative for chest pain and palpitations.      RESPIRATORY:  Negative for recent cough and dyspnea.      GASTROINTESTINAL:  Positive for constipation.   Negative for abdominal pain, diarrhea, nausea or vomiting.      GENITOURINARY:  Negative for dysuria and urinary  incontinence.      MUSCULOSKELETAL:  Positive for arthralgias, back pain and limb pain ( left upper extremity pain ).   Negative for myalgias.      NEUROLOGICAL:  Negative for paresthesias and weakness.      PSYCHIATRIC:  Positive for sleep disturbance.   Negative for anxiety, crying spells, depression, feelings of stress, anhedonia or suicidal thoughts.          Past Medical History / Family History / Social History:         Last Reviewed on 10/15/2020 03:08 PM by Jammie Phillips    Past Medical History: R Thoracic Outlet Syndrome    R Lateral and Medial Epicondylitis     R Carpal Tunnel    Osteoarthritis    Adenomatous Colonic Polyp    Osteopenia    Constipation, severe and chronic    DDD L-spine    DDD C-spine    Hypothyroidism    Toxic Goiter    Hiatal Hernia    Glaucoma OU    Chronic Pain, Base of Skull/Uppermost Neck     Hemorrhoids                        PAST MEDICAL HISTORY             CURRENT MEDICAL PROVIDERS:    Dentist: Jaclyn    Dermatologist: Romie    Gastroenterologist: Paulette    Obstetrician/Gynecologist: Manny    Ophthalmologist: [facility]         PAST MEDICAL HISTORY                 ADVANCED DIRECTIVES: None         PREVENTIVE HEALTH MAINTENANCE             BONE DENSITY: was last done 1/16/2019 osteopenia     COLORECTAL CANCER SCREENING: Up to date (colonoscopy q10y; sigmoidoscopy q5y; Cologuard q3y) was last done 9/16/2020, Results are in chart; colonoscopy with the following abnormalities noted-- Polyp(s), rectal mass palpated, and Diverticulosis     Hepatitis C Medicare Screening: was last done 8/2017     MAMMOGRAM: Done within last 2 years and results in are chart was last done 8/24/2020 with normal results     PAP SMEAR: Hysterectomy 2004         PAST MEDICAL HISTORY                 ADVANCED DIRECTIVES: None         Surgical History: Bi-tubal Ligation    R Thyroid Bx    Colonoscopy with Adenoma '07, '10, '15    EGD '08, '16    Laser Rx OU     Catheterization/Stent times one 11-15             Cholecystectomy: laparoscopic; 2017;     Hysterectomy: 2004;         Family History: NO Colon, Breast, Ovarian CA    Leukemia    Kidney Cancer    Diabetes         Social History:         Marital Status:      Children: 1 child         Tobacco/Alcohol/Supplements:     Last Reviewed on 10/15/2020 03:08 PM by Jammie Phillips    Tobacco: She has a past history of cigarette smoking; quit date:  11/2004.          Substance Abuse History:     Last Reviewed on 10/15/2020 03:08 PM by Jammie Phillips        Mental Health History:     Last Reviewed on 10/15/2020 03:08 PM by Jammie Phillips        Communicable Diseases (eg STDs):     Last Reviewed on 10/15/2020 03:08 PM by Jammie Phillips        Current Problems:     Last Reviewed on 7/09/2020 09:59 AM by Jammie Phillips    CAD - Atherosclerotic heart disease of native coronary artery without angina pectoris    Hypothyroidism, unspecified    Primary generalized (osteo)arthritis    Long term (current) use of opiate analgesic    Esophageal spasm - Dyskinesia of esophagus    Hiatal hernia - Diaphragmatic hernia without obstruction or gangrene    GERD - Gastro-esophageal reflux disease without esophagitis    Gastroparesis    Presence of intraocular lens    Scoliosis, site unspecified, Other idiopathic    Anxiety disorder, unspecified    Unspecified open-angle glaucoma, stage unspecified    Osteoporosis without current pathological fracture, age-related    Unspecified hemorrhoids    Constipation, unspecified    Encounter for other preprocedural examination    Encounter for other screening for malignant neoplasm of breast        Immunizations:     Fluzone Quadrivalent (3+ years) 10/8/2019        Allergies:     Last Reviewed on 7/09/2020 09:59 AM by Jammie Phillips    No Known Allergies.        Current Medications:     Last Reviewed on 7/09/2020 09:59 AM by Jammie Phillips    Atorvastatin Calcium 80mg Tablet [1 tab hs]    levothyroxine 100 mcg oral tablet [TAKE 1 TABLET  BY MOUTH EVERY DAY]    Prolia 60 mg/mL subcutaneous Syringe [give SQ q 6months]    Lisinopril 5 mg oral tablet [1 tab daily]    aspirin 81 mg oral tablet, delayed release (enteric coated) [1 tab daily]    traMADol 50 mg oral tablet [TAKE 1 TABLET BY MOUTH TWICE DAILY AS NEEDED]    gabapentin 300 mg oral capsule [TAKE 1 CAPSULE BY MOUTH EVERY DAY]    OTC Calcium 1200mg/Vitamin D3 1000IU daily     Colace 100 mg oral capsule [Take 2 capsule BID for constipation]    Gas-X     escitalopram oxalate 10 mg oral tablet [take 1 tablet (10 mg) by oral route once daily]    Protonix 40 mg oral tablet, delayed release (enteric coated) [1 tab daily]    Constulose 10 gram/15 mL oral Solution [Take 2 Tablespoon by mouth daily]    Meloxicam 15mg  [TAKE ONE TABLET BY MOUTH EVERY DAY]    Motegrity 2mg  [TAKE ONE TABLET BY MOUTH EVERY DAY]        Objective:        Vitals:         Current: 10/15/2020 2:48:08 PM    Ht:  5 ft, 3.5 in;  Wt: 137.8 lbs;  BMI: 24.0T: 97.4 F (oral);  BP: 115/72 mm Hg (right arm, sitting);  P: 61 bpm (right arm (BP Cuff), sitting);  sCr: 1.05 mg/dL;  GFR: 53.63VA: 20/20 OD, 20/20 OS (near, with correction)        Exams:     PHYSICAL EXAM:     GENERAL: vital signs recorded - well developed, well nourished;  well groomed;  no apparent distress;     EYES: extraocular movements intact; conjunctiva and cornea are normal; PERRLA;     E/N/T: OROPHARYNX:  normal mucosa, dentition, gingiva, and posterior pharynx;     RESPIRATORY: normal respiratory rate and pattern with no distress; normal breath sounds with no rales, rhonchi, wheezes or rubs;     CARDIOVASCULAR: normal rate; rhythm is regular;  no systolic murmur; no edema;     GASTROINTESTINAL: nontender; normal bowel sounds;     MUSCULOSKELETAL: normal gait; normal overall tone     NEUROLOGIC: mental status: alert and oriented x 3; Reflexes: brachioradialis: 2+; knee jerks: 2+;     PSYCHIATRIC: appropriate affect and demeanor; normal psychomotor function; normal  speech pattern;         Assessment:         Z00.00   Encounter for general adult medical examination without abnormal findings       Z13.31   Encounter for screening for depression       Z23   Encounter for immunization       M15.0   Primary generalized (osteo)arthritis           ORDERS:         Meds Prescribed:       [New Rx] DULoxetine 30 mg oral capsule,delayed release (enteric coated) [take 1 capsule (30 mg) by oral route once daily x 1 week then increase to 60 mg], #7 (seven) capsules, Refills: 0 (zero)       [New Rx] DULoxetine 60 mg oral capsule,delayed release (enteric coated) [take 1 capsule (60 mg) by oral route once daily], #30 (thirty) capsules, Refills: 5 (five)         Radiology/Test Orders:       3017F  Colorectal CA screen results documented and reviewed (PV)  (In-House)              Lab Orders:       APPTO  Appointment need  (In-House)              Procedures Ordered:         Annual wellness visit, includes a PPPS, subsequent visit  (In-House)              Other Orders:       60522  Influenza virus vaccine, quadrivalent, split virus, preservative free 3 years of age & older  (In-House)              Depression screen positive and follow up plan documented  (In-House)            1101F  Pt screen for fall risk; document no falls in past year or only 1 fall w/o injury in past year (EMMETT)  (In-House)              Screening mammogram results documented  (Send-Out)            1124F  Advance Care Planning discussed and doc in MR; no surrogate named or advance care plan provided  (Send-Out)              Administration of influenza virus vaccine  (x1)                  Plan:         Encounter for general adult medical examination without abnormal findingsShe is UTD on colonoscopy, last done 9/2020 and this showed diverticulosis as well as a rectal mass.  She had that removed by a colorectal surgeon, dxed as HSIL lesion.  She is going back in 2 months with Dr. Luke to have repeat  exploratory surgery.  Pap smear is no longer indicated by history; s/p hysterectomy.  She is UTD on mammogram, last done 8/2020 and this was normal.  She is UTD on DEXA, last done 1/2019 and this showed osteopenia.  She is reportedly UTD [facility]  She is due for Shingrix and flu.  Flu shot given today.  Shingrix can be done at the pharmacy.  She is UTD on routine labs.  No fall risk, no memory issues.  She is being treated for anxiety and depression.  She lives alone.  She is able to drive and perform ADLs/manage finances independently.  Hearing is adequate.  She does not have a living will.  Preventive services handout and safety handout were given to her.  Current doctor list updated.  RTC 6 months.    MIPS PHQ-9 Depression Screening: Completed form scanned and in chart; Total Score 9 Positive Depression Screen: Suicide Risk Assessment completed--denies suicidal/homicidal ideation; Stable on medications. No suicidal ideation.  ADVANCED DIRECTIVES: None         FOLLOW-UP: Schedule a follow-up visit in 6 months.:.  f/u hypothyroidism with [place]          Orders:         Annual wellness visit, includes a PPPS, subsequent visit  (In-House)              Depression screen positive and follow up plan documented  (In-House)            1101F  Pt screen for fall risk; document no falls in past year or only 1 fall w/o injury in past year (EMMETT)  (In-House)              Screening mammogram results documented  (Send-Out)            3017F  Colorectal CA screen results documented and reviewed (PV)  (In-House)            1124F  Advance Care Planning discussed and doc in MR; no surrogate named or advance care plan provided  (Send-Out)            APPTO  Appointment need  (In-House)              Encounter for immunization          Immunizations:       68138  Influenza virus vaccine, quadrivalent, split virus, preservative free 3 years of age & older  (In-House)                Dose (ml):  0.5  Site: right deltoid  Route: intramuscular  Administered by: Amy Sotelo          : Seqirus  Lot #: [id number]  Exp: 06/30/2021          NDC: [address]-87        Administration of influenza virus vaccine  (x1)          Primary generalized (osteo)arthritis          Prescriptions:       [New Rx] DULoxetine 30 mg oral capsule,delayed release (enteric coated) [take 1 capsule (30 mg) by oral route once daily x 1 week then increase to 60 mg], #7 (seven) capsules, Refills: 0 (zero)       [New Rx] DULoxetine 60 mg oral capsule,delayed release (enteric coated) [take 1 capsule (60 mg) by oral route once daily], #30 (thirty) capsules, Refills: 5 (five)             Patient Recommendations:        For  Encounter for general adult medical examination without abnormal findings:    Schedule a follow-up visit in 6 months.                APPOINTMENT INFORMATION:        Monday Tuesday Wednesday Thursday Friday Saturday Sunday            Time:___________________AM  PM   Date:_____________________             Charge Capture:         Primary Diagnosis:     Z00.00  Encounter for general adult medical examination without abnormal findings           Orders:        Annual wellness visit, includes a PPPS, subsequent visit  (In-House)              Depression screen positive and follow up plan documented  (In-House)            1101F  Pt screen for fall risk; document no falls in past year or only 1 fall w/o injury in past year (EMMETT)  (In-House)            3017F  Colorectal CA screen results documented and reviewed (PV)  (In-House)            APPTO  Appointment need  (In-House)              Z13.31  Encounter for screening for depression     Z23  Encounter for immunization           Orders:      50143  Influenza virus vaccine, quadrivalent, split virus, preservative free 3 years of age & older  (In-House)              Administration of influenza virus vaccine  (x1)          M15.0  Primary  generalized (osteo)arthritis

## 2021-05-18 NOTE — PROGRESS NOTES
Addie Yates 1960     Office/Outpatient Visit    Visit Date: Wed, Feb 28, 2018 10:57 am    Provider: Jammie Phillips MD (Assistant: Amy Sotelo MA)    Location: [facility]        Electronically signed by Jammie Phillips MD on  02/28/2018 03:01:46 PM                             SUBJECTIVE:        CC:     Ms. Yates is a 57 year old White female.  This is a follow-up visit.  3 month check up; neck and back pain, gastroparesis, HTN, CAD         HPI: Addie is here today to f/u on chronic issues.        She is on tramadol and gabapentin for chronic neck pain.  She uses the tramadol usually 1-3 times per week.  She is on gabapentin 300 mg daily started by Dr. Gastelum.  That is not really doing much for her, but it helps somewhat.  She is not able to increase the dose because when she does, her pain actually increases.  She has been fairly stable overall on this regimen and it does allow her to function.        She is on lisinopril and atorvastatin along with DAPT (ASA/Plavix) for CAD s/p stenting in 11/2015.  Dr. Ernandez did a stress test on her back in November and that was unremarkable.            She is on levothyroxine for hypothyroidism.        She is on ranitidine and Protonix for GERD.  In the past, she has been on Linzess and Amitiza for constipation with limited success but is not currently taking these.  She has diagnosed hiatal hernia and gastritis.  Dr. Leroy has done an EGD as well as a gastric emptying study.  It did show some gastroparesis so dietary changes were initiated. She is also taking a probiotic now.  She just saw Dr. Leroy on 2-14-18.     ROS:     CONSTITUTIONAL:  Negative for fatigue and fever.      EYES:  Negative for blurred vision.      E/N/T:  Negative for diminished hearing and nasal congestion.      CARDIOVASCULAR:  Negative for chest pain and palpitations.      RESPIRATORY:  Negative for recent cough and dyspnea.      GASTROINTESTINAL:  Positive for acid reflux  symptoms and constipation.   Negative for abdominal pain, diarrhea, nausea or vomiting.      MUSCULOSKELETAL:  Positive for arthralgias and back pain.   Negative for myalgias.      NEUROLOGICAL:  Negative for paresthesias and weakness.          PMH/FMH/SH:     Last Reviewed on 2/28/2018 11:45 AM by Jammie Phillips    Past Medical History: R Thoracic Outlet Syndrome    R Lateral and Medial Epicondylitis     R Carpal Tunnel    Osteoarthritis    Adenomatous Colonic Polyp    Osteopenia    Constipation, severe and chronic    DDD L-spine    DDD C-spine    Hypothyroidism    Toxic Goiter    Hiatal Hernia    Glaucoma OU    Chronic Pain, Base of Skull/Uppermost Neck     Hemorrhoids                        PAST MEDICAL HISTORY             CURRENT MEDICAL PROVIDERS:    Dentist: Jaclyn    Dermatologist: Romie    Gastroenterologist: Paulette    Obstetrician/Gynecologist: Manny    Ophthalmologist: [facility]         PAST MEDICAL HISTORY                 ADVANCED DIRECTIVES: None         PREVENTIVE HEALTH MAINTENANCE             BONE DENSITY: was last done 6/30/14 osteopenia     COLORECTAL CANCER SCREENING: Up to date (colonoscopy q10y; sigmoidoscopy q5y; Cologuard q3y) was last done 3/26/2015, Results are in chart; 3/26/15     MAMMOGRAM: Done within last 2 years and results in are chart was last done 7/20/17 with normal results     PAP SMEAR: Hysterectomy 2004         Surgical History: Bi-tubal Ligation    R Thyroid Bx    Colonoscopy with Adenoma '07, '10, '15    EGD '08, '16    Laser Rx OU     Catheterization/Stent times one 11-15            Cholecystectomy: laparoscopic; 2017;     Hysterectomy: 2004;         Family History: NO Colon, Breast, Ovarian CA    Leukemia    Kidney Cancer    Diabetes         Social History:         Marital Status:      Children: 1 child         Tobacco/Alcohol/Supplements:     Last Reviewed on 2/28/2018 11:45 AM by Jammie Phillips    Tobacco: She has a past history of cigarette smoking; quit  date:  11/2004.          Substance Abuse History:     Last Reviewed on 2/28/2018 11:45 AM by Jammie Phillips        Mental Health History:     Last Reviewed on 2/28/2018 11:45 AM by Jammie Phillips        Communicable Diseases (eg STDs):     Last Reviewed on 2/28/2018 11:45 AM by Jammie Phillips            Current Problems:     Last Reviewed on 11/30/2017 09:15 AM by Jammie Phillips    GERD     Esophageal spasm     Hiatal hernia     Use of high risk medications     Hemorrhoids, NOS     Unspecified glaucoma     Hypothyroidism     Osteopenia     Colon polyps     Osteoarthritis, NOS     CAD         Immunizations:     None        Allergies:     Last Reviewed on 11/30/2017 09:15 AM by Jammie Phillips      No Known Drug Allergies.         Current Medications:     Last Reviewed on 11/30/2017 09:15 AM by Jammie Phillips    Polyethylene Glycol 3350  Powder for Oral Solution 1 capful (17g) daily, mixed in 4-8oz of liquid daily     Synthroid 0.088mg Tablet Take 1 tablet(s) by mouth daily     Tramadol 50mg Tablet 1 po BID PRN     Atorvastatin Calcium 80mg Tablet 1 tab hs     Plavix 75mg Tablet 1 tab daily     Ranitidine 300mg Tablet 1 Tab QHS     Aspirin (ASA) 81mg Tablets, Enteric Coated 1 tab daily     Lisinopril 5mg Tablet 1 tab daily     Prolia 60mg/1ml Injection give SQ q 6months     Pantoprazole 20mg Tablets, Delayed Release Take 1 tablet(s) by mouth daily     Gabapentin 300mg Capsules 1 capsule daily     Magnesium 250mg daily OTC     Colace 100mg Capsules Take 2 capsule BID for constipation     OTC Calcium 1200mg/Vitamin D3 1000IU daily         OBJECTIVE:        Vitals:         Current: 2/28/2018 10:59:41 AM    Ht:  5 ft, 3.5 in;  Wt: 133 lbs;  BMI: 23.2    T: 96.9 F (oral);  BP: 119/77 mm Hg (right arm, sitting);  P: 70 bpm (right arm (BP Cuff), sitting);  sCr: 0.81 mg/dL;  GFR: 71.00        Exams:     PHYSICAL EXAM:     GENERAL: vital signs recorded - well developed, well nourished;  well groomed;  no apparent  distress;     EYES: extraocular movements intact; conjunctiva and cornea are normal; PERRLA;     E/N/T: OROPHARYNX:  normal mucosa, dentition, gingiva, and posterior pharynx;     RESPIRATORY: normal respiratory rate and pattern with no distress; normal breath sounds with no rales, rhonchi, wheezes or rubs;     CARDIOVASCULAR: normal rate; rhythm is regular;  no systolic murmur; no edema;     GASTROINTESTINAL: nontender; normal bowel sounds;     MUSCULOSKELETAL: normal gait; pain with range of motion in: neck lateral flexion and rotation;  normal overall tone         Lab/Test Results:             Urine temperature:  confirmed (02/28/2018),     All urine drug screen levels confirmed negative:  yes (02/28/2018),     Date and time of last pill:  Gabapentin 2-27 @ 10PM and Tramadol was last week (02/28/2018),     Performed by:  emery (02/28/2018),     Collection Time:  1152 (02/28/2018),             ASSESSMENT           715.98   M15.0  Osteoarthritis, NOS              DDx:     V58.69   Z79.891  Use of high risk medications              DDx:     414.01   I25.10  CAD              DDx:     244.9   E03.9  Hypothyroidism              DDx:     536.3   K31.84  Gastroparesis              DDx:     530.81   K21.9  GERD              DDx:         ORDERS:         Meds Prescribed:       Refill of: Polyethylene Glycol 3350 Powder for Oral Solution 1 capful (17g) daily, mixed in 4-8oz of liquid daily  #1 (One) 527 gm container Refills: 3       Refill of: Gabapentin 300mg Capsules 1 capsule daily  #30 (Thirty) capsule(s) Refills: 3         Lab Orders:       [id number]  Drug test prsmv qual dir optical obs per day  (In-House)         [id number]  [id number] [facility] Comp Metabolic Panel (14)  (Send-Out)         78244  [id number] [facility] Lipid Panel (Excludes LDL/HDL ratio)  (Send-Out)         40273  [id number] [facility] TSH  (Send-Out)                   PLAN:          Osteoarthritis, NOS She is stable on the tramadol and gabapentin.  Pain is fairly well controlled.   No adverse effects.  She does require ongoing use of this controlled substance to function.  Tox screen and Ugo run.  RTC 4 months.         FOLLOW-UP: Schedule a follow-up visit in 4 months..            Prescriptions:       Refill of: Gabapentin 300mg Capsules 1 capsule daily  #30 (Thirty) capsule(s) Refills: 3           Patient Education Handouts:       [facility] [place]           Use of high risk medications         FOLLOW-UP: Schedule a follow-up visit in 4 months..  Controlled substance documentation: Ugo reviewed; drug screen performed and appropriate; consent is reviewed and signed and on the chart.  She is aware of risk of addiction on this medication, understands that she will need to follow up for a review every 3 months and her medications will be adjusted or decreased as deemed appropriate at each visit.  No history of drug or alcohol abuse.  No concerns about diversion or abuse. She denies side effects related to the medication.  She is aware that she may be called in for pill counts.  The dosing of this medication will be reviewed on a regular basis and reduced if possible..  Ongoing use of a controlled substance is necessary for this patient to have a normal quality of life Drug screen           Orders:       [id number]  Drug test prsmv qual dir optical obs per day  (In-House)            CAD Stable.  No refills needed.  Checking labs.     [facility]  [facility]  CMP Lipid panel           Orders:       [id number]  [id number] [facility] Comp [facility]14)  (Send-Out)         [id number]  [id number] [facility] Lipid Panel (Excludes LDL/HDL ratio)  (Send-Out)            Hypothyroidism No refills needed.  Checking labs.     LABORATORY:  Labs ordered to be performed today at [facility] include.  TSH           Orders:       [id number]  [id number] [facility] TSH  (Send-Out)            Gastroparesis Still following with Dr. Leroy, whom she just saw on 2-14-18.  They have recently  started a probiotic but she has not noticed much benefit from that yet.  She goes back there in 3 months.            Prescriptions:       Refill of: Polyethylene Glycol 3350 Powder for Oral Solution 1 capful (17g) daily, mixed in 4-8oz of liquid daily  #1 (One) 527 gm container Refills: 3          GERD As per gastroparesis.             Patient Recommendations:        For  Osteoarthritis, NOS:     Schedule a follow-up visit in 4 months.                APPOINTMENT INFORMATION:        Monday Tuesday Wednesday Thursday Friday Saturday Sunday            Time:___________________AM  PM   Date:_____________________         For  Use of high risk medications:     Schedule a follow-up visit in 4 months.                APPOINTMENT INFORMATION:        Monday Tuesday Wednesday Thursday Friday Saturday Sunday            Time:___________________AM  PM   Date:_____________________             CHARGE CAPTURE           **Please note: ICD descriptions below are intended for billing purposes only and may not represent clinical diagnoses**        Primary Diagnosis:         715.98 Osteoarthritis, NOS            M15.0    Primary generalized (osteo)arthritis              Orders:          [id number]   Office/outpatient visit; established patient, level 4  (In-House)           V58.69 Use of high risk medications            Z79.891    Long term (current) use of opiate analgesic              Orders:          [id number]   Drug test prsmv qual dir optical obs per day  (In-House)           414.01 CAD            I25.10    Atherosclerotic heart disease of native coronary artery without angina pectoris    244.9 Hypothyroidism            E03.9    Hypothyroidism, unspecified    536.3 Gastroparesis            K31.84    Gastroparesis    530.81 GERD            K21.9    Gastro-esophageal reflux disease without esophagitis

## 2021-05-18 NOTE — PROGRESS NOTES
Addie Yates 1960     Office/Outpatient Visit    Visit Date: Tue, Jul 3, 2018 01:32 pm    Provider: Jammie Phillips MD (Assistant: Margret Meier LPN)    Location: [facility]        Electronically signed by Jammie Phillips MD on  07/03/2018 08:51:03 PM                             SUBJECTIVE:        CC:     Ms. Yates is a 58 year old White female.  6 month check up; neck pain, CAD, GERD         HPI: Addie is here for routine follow-up on chronic issues.        She is on tramadol and gabapentin for chronic neck pain, scoliosis and generalized OA.  She takes tramadol around 1-3 times per week.  She is on gabapentin 300 mg daily.  When we have tried increasing her dose in the past, she actually had increased pain.        She is on lisinopril, atorvastatin, ASA and Plavix for CAD s/p stenting in 11/2015.  She follows with Dr. Ernandez Cardiology.  No CP, palpitations, SOB.        She is on levothyroxine for hypothyroidism.  No heat/cold intolerance, no changes in hair or skin.        She is on ranitidine and pantoprazole both for GERD and esophageal spasm.  She has some problems with chronic diarrhea, for which she has used Linzess and Amitiza in the past.  She follows with Dr. Leroy.  Her last EGD showed hiatal hernia and gastritis.  Gastric emptying study has shown gastroparesis.         She had bilateral intraocular lens replacement 7/2016.  She had glaucoma and cataract repair at the same time.        She is on Prolia on osteoporosis.  She is tolerating it well.          Dr. Bryant started her on Wellbutrin for decreased libido.      ROS:     CONSTITUTIONAL:  Negative for fatigue and fever.      EYES:  Negative for blurred vision.      E/N/T:  Negative for diminished hearing and nasal congestion.      CARDIOVASCULAR:  Negative for chest pain and palpitations.      RESPIRATORY:  Negative for recent cough and dyspnea.      GASTROINTESTINAL:  Positive for acid reflux symptoms and constipation.    Negative for abdominal pain, diarrhea, nausea or vomiting.      MUSCULOSKELETAL:  Positive for arthralgias and back pain.   Negative for myalgias.      NEUROLOGICAL:  Negative for paresthesias and weakness.          PMH/FMH/SH:     Last Reviewed on 7/03/2018 01:47 PM by Jammie Phillips    Past Medical History: R Thoracic Outlet Syndrome    R Lateral and Medial Epicondylitis     R Carpal Tunnel    Osteoarthritis    Adenomatous Colonic Polyp    Osteopenia    Constipation, severe and chronic    DDD L-spine    DDD C-spine    Hypothyroidism    Toxic Goiter    Hiatal Hernia    Glaucoma OU    Chronic Pain, Base of Skull/Uppermost Neck     Hemorrhoids                        PAST MEDICAL HISTORY             CURRENT MEDICAL PROVIDERS:    Dentist: Jaclyn    Dermatologist: Romie    Gastroenterologist: Paulette    Obstetrician/Gynecologist: Manny    Ophthalmologist: [facility]         PAST MEDICAL HISTORY                 ADVANCED DIRECTIVES: None         PREVENTIVE HEALTH MAINTENANCE             BONE DENSITY: was last done 6/30/14 osteopenia     COLORECTAL CANCER SCREENING: Up to date (colonoscopy q10y; sigmoidoscopy q5y; Cologuard q3y) was last done 3/26/2015, Results are in chart; 3/26/15     MAMMOGRAM: Done within last 2 years and results in are chart was last done 7/20/17 with normal results     PAP SMEAR: Hysterectomy 2004         Surgical History: Bi-tubal Ligation    R Thyroid Bx    Colonoscopy with Adenoma '07, '10, '15    EGD '08, '16    Laser Rx OU     Catheterization/Stent times one 11-15            Cholecystectomy: laparoscopic; 2017;     Hysterectomy: 2004;         Family History: NO Colon, Breast, Ovarian CA    Leukemia    Kidney Cancer    Diabetes         Social History:         Marital Status:      Children: 1 child         Tobacco/Alcohol/Supplements:     Last Reviewed on 7/03/2018 01:47 PM by Jammie Phillips    Tobacco: She has a past history of cigarette smoking; quit date:  11/2004.           Substance Abuse History:     Last Reviewed on 7/03/2018 01:47 PM by Jammie Phillips        Mental Health History:     Last Reviewed on 7/03/2018 01:47 PM by Jammie Phillips        Communicable Diseases (eg STDs):     Last Reviewed on 7/03/2018 01:47 PM by Jammie Phillips            Current Problems:     Last Reviewed on 2/28/2018 11:45 AM by Jammie Phillips    Gastroparesis     GERD     Esophageal spasm     Hiatal hernia     Use of high risk medications     Hemorrhoids, NOS     Unspecified glaucoma     Hypothyroidism     Osteopenia     Colon polyps     Osteoarthritis, NOS     CAD         Immunizations:     None        Allergies:     Last Reviewed on 7/03/2018 01:35 PM by Margret Meier      No Known Drug Allergies.         Current Medications:     Last Reviewed on 7/03/2018 01:38 PM by Margret Meier    Synthroid 0.088mg Tablet Take 1 tablet(s) by mouth daily     Polyethylene Glycol 3350  Powder for Oral Solution 1 capful (17g) daily, mixed in 4-8oz of liquid daily     Tramadol 50mg Tablet 1 po BID PRN     Atorvastatin Calcium 80mg Tablet 1 tab hs     Plavix 75mg Tablet 1 tab daily     Aspirin (ASA) 81mg Tablets, Enteric Coated 1 tab daily     Lisinopril 5mg Tablet 1 tab daily     Prolia 60mg/1ml Injection give SQ q 6months     Pantoprazole 20mg Tablets, Delayed Release Take 1 tablet(s) by mouth daily     Colace 100mg Capsules Take 2 capsule BID for constipation     OTC Calcium 1200mg/Vitamin D3 1000IU daily     Gas-X     Wellbutrin XL 300mg Tablets, Extended Release 1 tab daily         OBJECTIVE:        Vitals:         Current: 7/3/2018 1:34:43 PM    Ht:  5 ft, 3.5 in;  Wt: 129.9 lbs;  BMI: 22.6    T: 97.5 F (oral);  BP: 111/76 mm Hg (left arm, sitting);  P: 68 bpm (left arm (BP Cuff), sitting);  sCr: 0.95 mg/dL;  GFR: 59.22        Exams:     PHYSICAL EXAM:     GENERAL: vital signs recorded - well developed, well nourished;  well groomed;  no apparent distress;     EYES: extraocular movements  intact; conjunctiva and cornea are normal; PERRLA;     E/N/T: OROPHARYNX:  normal mucosa, dentition, gingiva, and posterior pharynx;     RESPIRATORY: normal respiratory rate and pattern with no distress; normal breath sounds with no rales, rhonchi, wheezes or rubs;     CARDIOVASCULAR: normal rate; rhythm is regular;  no systolic murmur; no edema;     GASTROINTESTINAL: nontender; normal bowel sounds;     MUSCULOSKELETAL: normal gait; pain with range of motion in: neck lateral flexion and rotation;  normal overall tone         Lab/Test Results:             Urine temperature:  confirmed (07/03/2018),     All urine drug screen levels confirmed negative:  yes (07/03/2018),     Date and time of last pill:  Tramadol 1 week ago (07/03/2018),     Performed by:  tls (07/03/2018),     Collection Time:  1400 (07/03/2018),             ASSESSMENT           715.98   M15.0  Osteoarthritis, NOS              DDx:     737.30   M41.20  Scoliosis              DDx:     V58.69   Z79.891  Use of high risk medications              DDx:     414.01   I25.10  CAD              DDx:     244.9   E03.9  Hypothyroidism              DDx:     530.81   K21.9  GERD              DDx:     536.3   K31.84  Gastroparesis              DDx:     V43.1   Z96.1  Artificial lens replacement              DDx:         ORDERS:         Meds Prescribed:       Refill of: Synthroid (Levothyroxine Sodium) 0.088mg Tablet Take 1 tablet(s) by mouth daily  #90 (Ninety) tablet(s) Refills: 10         Lab Orders:       APPTO  Appointment need  (In-House)         [id number]  Drug test prsmv qual dir optical obs per day  (In-House)                   PLAN:          Osteoarthritis, NOS Arthritis and neck back pain are at about typical levels.  She is stable on daily gabapentin with prn tramadol for breakthrough pain.  She does require ongoing use of these controlled substances to function.  No adverse effects.  No refills needed today.  Tox screen and Ugo run.  RTC 3 months for AWV.          FOLLOW-UP: Schedule a follow-up visit in 3 months..  Medicare wellness 30 min with Jacqueline           Orders:       APPTO  Appointment need  (In-House)             Patient Education Handouts:       [facility] [place] Medication Compliance           Scoliosis As per OA plan.          Use of high risk medications     Controlled substance documentation: Ugo reviewed; drug screen performed and appropriate; consent is reviewed and signed and on the chart.  She is aware of risk of addiction on this medication, understands that she will need to follow up for a review every 3 months and her medications will be adjusted or decreased as deemed appropriate at each visit.  No history of drug or alcohol abuse.  No concerns about diversion or abuse. She denies side effects related to the medication.  She is aware that she may be called in for pill counts.  The dosing of this medication will be reviewed on a regular basis and reduced if possible..  Ongoing use of a controlled substance is necessary for this patient to have a normal quality of life Drug screen           Orders:       [id number]  Drug test prsmv qual dir optical obs per day  (In-House)            CAD Stable.  Asymptomatic.  No refills needed.  Labs UTD -- plan to recheck next visit.          Hypothyroidism Asymptomatic.  Refills sent.  Labs UTD -- plan to recheck next visit.           Prescriptions:       Refill of: Synthroid (Levothyroxine Sodium) 0.088mg Tablet Take 1 tablet(s) by mouth daily  #90 (Ninety) tablet(s) Refills: 10          GERD Stable.  No refills needed.  Following with Dr. Leroy.          Gastroparesis As per GERD.          Artificial lens replacement S/p lens replacement following cataract surgery 2016 (and glaucoma repair).             Patient Recommendations:        For  Osteoarthritis, NOS:     Schedule a follow-up visit in 3 months.                APPOINTMENT INFORMATION:        Monday Tuesday Wednesday Thursday Friday Saturday Sunday             Time:___________________AM  PM   Date:_____________________             CHARGE CAPTURE           **Please note: ICD descriptions below are intended for billing purposes only and may not represent clinical diagnoses**        Primary Diagnosis:         715.98 Osteoarthritis, NOS            M15.0    Primary generalized (osteo)arthritis              Orders:          [id number]   Office/outpatient visit; established patient, level 4  (In-House)             APPTO   Appointment need  (In-House)           737.30 Scoliosis            M41.20    Other idiopathic scoliosis, site unspecified    V58.69 Use of high risk medications            Z79.891    Long term (current) use of opiate analgesic              Orders:          [id number]   Drug test prsmv qual dir optical obs per day  (In-House)           414.01 CAD            I25.10    Atherosclerotic heart disease of native coronary artery without angina pectoris    244.9 Hypothyroidism            E03.9    Hypothyroidism, unspecified    530.81 GERD            K21.9    Gastro-esophageal reflux disease without esophagitis    536.3 Gastroparesis            K31.84    Gastroparesis    V43.1 Artificial lens replacement            Z96.1    Presence of intraocular lens        ADDENDUMS:      ____________________________________    Addendum: 07/06/2018 10:21 AM - Elina No        addendum please add [employer] form fee. [phone]

## 2021-05-18 NOTE — PROGRESS NOTES
"Addie Yates S  1960     Office/Outpatient Visit    Visit Date: Wed, Jan 8, 2020 10:01 am    Provider: Jammie Phillips MD (Assistant: Lyric Galicia, )    Location: [facility]        Electronically signed by Jammie Phillips MD on  01/08/2020 12:32:18 PM                             Subjective:        CC: Ms. Yates is a 59 year old White female.  3 month check up;         HPI: Addie is here today to follow up on chronic issues.        She is on tramadol and gabapentin for chronic neck and back pain d/t generalized OA and scoliosis.  She takes the gabapentin 300 mg daily and the tramadol 1-3 times weekly for breakthrough pain.  She actually has not been using this much at all recently.  Pain is well controlled.  No adverse effects.    We have been trying to get her in [facility] since they are closer than [facility].  However, she has not heard anything back from [facility] so far.        She is on lisinopril, atorvastatin, and ASA/Plavix for CAD s/p stenting in 11/2015.  She follows with Wayne Daniels.  No CP, palpitations, SOB.  She had a recent treadmill stress test in Nov and got a good report from that.        She is on levothyroxine for hypothyroidism.  No heat/cold intolerance, no changes in hair or skin.        She is on pantoprazole for GERD and esophageal spasm.  She follows with Dr. Leroy.  Her last EGD showed hiatal hernia and gastritis.  Gastric emptying study has shown gastroparesis.  She is now on Motegrity and that has been helping her bowel movements.        She is on Prolia for osteoporosis.  Last DEXA done 1/2019 showed osteopenia.          She is on Lexapro for anxiety.  Her anxiety has been doing \"so-so.\"  She would like to increase that if possible.        Does not have an advanced care directive.    ROS:     CONSTITUTIONAL:  Negative for fatigue and fever.      EYES:  Negative for blurred vision.      E/N/T:  Negative for diminished hearing and nasal congestion.      " CARDIOVASCULAR:  Negative for chest pain and palpitations.      RESPIRATORY:  Negative for recent cough and dyspnea.      GASTROINTESTINAL:  Positive for constipation.   Negative for abdominal pain, diarrhea, nausea or vomiting.      MUSCULOSKELETAL:  Positive for arthralgias, back pain and limb pain ( left upper extremity pain ).   Negative for myalgias.      PSYCHIATRIC:  Positive for sleep disturbance.   Negative for anxiety, crying spells, depression, feelings of stress, anhedonia or suicidal thoughts.          Past Medical History / Family History / Social History:         Last Reviewed on 1/08/2020 10:09 AM by Jammie Phillips    Past Medical History: R Thoracic Outlet Syndrome    R Lateral and Medial Epicondylitis     R Carpal Tunnel    Osteoarthritis    Adenomatous Colonic Polyp    Osteopenia    Constipation, severe and chronic    DDD L-spine    DDD C-spine    Hypothyroidism    Toxic Goiter    Hiatal Hernia    Glaucoma OU    Chronic Pain, Base of Skull/Uppermost Neck     Hemorrhoids                        PAST MEDICAL HISTORY             CURRENT MEDICAL PROVIDERS:    Dentist: Jaclyn    Dermatologist: Romie    Gastroenterologist: Paulette    Obstetrician/Gynecologist: Manny    Ophthalmologist: [facility]         PAST MEDICAL HISTORY                 ADVANCED DIRECTIVES: None         PREVENTIVE HEALTH MAINTENANCE             BONE DENSITY: was last done 1/16/2019 osteopenia     COLORECTAL CANCER SCREENING: Up to date (colonoscopy q10y; sigmoidoscopy q5y; Cologuard q3y) was last done 3/26/2015, Results are in chart; colonoscopy with normal results     Hepatitis C Medicare Screening: was last done 8/2017     MAMMOGRAM: Done within last 2 years and results in are chart was last done 7/29/2019 with normal results     PAP SMEAR: Hysterectomy 2004         PAST MEDICAL HISTORY                 ADVANCED DIRECTIVES: None         Surgical History: Bi-tubal Ligation    R Thyroid Bx    Colonoscopy with Adenoma '07, '10,  '15    EGD '08, '16    Laser Rx OU     Catheterization/Stent times one 11-15            Cholecystectomy: laparoscopic; 2017;     Hysterectomy: 2004;         Family History: NO Colon, Breast, Ovarian CA    Leukemia    Kidney Cancer    Diabetes         Social History:         Marital Status:      Children: 1 child         Tobacco/Alcohol/Supplements:     Last Reviewed on 1/08/2020 10:09 AM by Jammie Phillips    Tobacco: She has a past history of cigarette smoking; quit date:  11/2004.          Substance Abuse History:     Last Reviewed on 1/08/2020 10:09 AM by Jammie Phillips        Mental Health History:     Last Reviewed on 1/08/2020 10:09 AM by Jammie Phililps        Communicable Diseases (eg STDs):     Last Reviewed on 1/08/2020 10:09 AM by Jammie Phillips        Current Problems:     Last Reviewed on 10/08/2019 09:50 AM by Jammie Phillips    CAD - Atherosclerotic heart disease of native coronary artery without angina pectoris    Hypothyroidism, unspecified    Primary generalized (osteo)arthritis    Long term (current) use of opiate analgesic    Esophageal spasm - Dyskinesia of esophagus    Hiatal hernia - Diaphragmatic hernia without obstruction or gangrene    GERD - Gastro-esophageal reflux disease without esophagitis    Gastroparesis    Presence of intraocular lens    Scoliosis, site unspecified, Other idiopathic    Anxiety disorder, unspecified    Unspecified open-angle glaucoma, stage unspecified    Unspecified hemorrhoids    Osteoporosis without current pathological fracture, age-related        Immunizations:     Fluzone Quadrivalent (3+ years) 10/8/2019        Allergies:     Last Reviewed on 10/08/2019 09:50 AM by Jammie Phillisp    No Known Allergies.        Current Medications:     Last Reviewed on 10/08/2019 09:50 AM by Jammie Phillips    Atorvastatin Calcium 80mg Tablet [1 tab hs]    Polyethylene Glycol 3350  Powder for Oral Solution [1 capful (17g) daily, mixed in 4-8oz of liquid daily]     Synthroid 0.1mg Tablet [1 tab daily]    Lisinopril 5 mg oral tablet [1 tab daily]    aspirin 81 mg oral tablet, delayed release (enteric coated) [1 tab daily]    Prolia 60 mg/mL subcutaneous Syringe [give SQ q 6months]    Tramadol 50 mg oral tablet [1 po BID PRN]    gabapentin 300 mg oral capsule [TAKE 1 CAPSULE BY MOUTH EVERY DAY]    OTC Calcium 1200mg/Vitamin D3 1000IU daily     Colace 100 mg oral capsule [Take 2 capsule BID for constipation]    Gas-X     escitalopram oxalate 5 mg oral tablet [TAKE 1 TABLET BY MOUTH EVERY DAY]    Protonix 40 mg oral tablet, delayed release (enteric coated) [1 tab daily]    Constulose 10 gram/15 mL oral Solution [Take 2 Tablespoon by mouth daily]        Objective:        Vitals:         Current: 1/8/2020 10:05:10 AM    Ht:  5 ft, 3.5 in;  Wt: 130.6 lbs;  BMI: 22.8T: 98 F (oral);  BP: 116/70 mm Hg (left arm, sitting);  P: 69 bpm (left arm (BP Cuff), sitting);  sCr: 0.83 mg/dL;  GFR: 67.13        Exams:     PHYSICAL EXAM:     GENERAL: vital signs recorded - well developed, well nourished;  well groomed;  no apparent distress;     EYES: extraocular movements intact; conjunctiva and cornea are normal; PERRLA;     E/N/T: OROPHARYNX:  normal mucosa, dentition, gingiva, and posterior pharynx;     RESPIRATORY: normal respiratory rate and pattern with no distress; normal breath sounds with no rales, rhonchi, wheezes or rubs;     CARDIOVASCULAR: normal rate; rhythm is regular;  no systolic murmur; no edema;     GASTROINTESTINAL: nontender; normal bowel sounds;     MUSCULOSKELETAL: normal gait; normal overall tone     PSYCHIATRIC: appropriate affect and demeanor; normal psychomotor function; normal speech pattern;         Lab/Test Results:         Urine temperature: confirmed (01/08/2020),     All urine drug screen levels confirmed negative: yes (01/08/2020),     Date and time of last pill: Tramadol 1/8/2020@830, Gabapentin 1-7-2020@2100./atc (01/08/2020),     Performed by: pr (01/08/2020),      Collection Time: 10:16 (01/08/2020),             Assessment:         M15.0   Primary generalized (osteo)arthritis       Z79.891   Long term (current) use of opiate analgesic       I25.10   CAD - Atherosclerotic heart disease of native coronary artery without angina pectoris       E03.9   Hypothyroidism, unspecified       K21.9   GERD - Gastro-esophageal reflux disease without esophagitis       F41.9   Anxiety disorder, unspecified       M81.0   Osteoporosis without current pathological fracture, age-related           ORDERS:         Meds Prescribed:       [Refilled] escitalopram oxalate 10 mg oral tablet [take 1 tablet (10 mg) by oral route once daily], #30 (thirty) tablets, Refills: 3 (three)         Lab Orders:       20409  Drug test prsmv read direct optical obs pr date  (In-House)            APPTO  Appointment need  (In-House)              Other Orders:       1124F  Advance Care Planning discussed and doc in MR; no surrogate named or advance care plan provided  (Send-Out)                      Plan:         Primary generalized (osteo)arthritisAlize is stable on her current regimen.  Pain is well controlled.  No adverse effects.  She does require ongoing use of this controlled substance to function.  Tox screen and Ugo run today.  No refills needed today.  RTC 3 months.    MIPS Vaccines Flu and Pneumonia updated in Shot record Advance Directive/Surrogate Decision Maker discussed and pt declines to complete today     FOLLOW-UP: Schedule a follow-up visit in 3 months.:.  f/u OA with [facility]          Orders:       APPTO  Appointment need  (In-House)            1124F  Advance Care Planning discussed and doc in MR; no surrogate named or advance care plan provided  (Send-Out)              Long term (current) use of opiate analgesic    Controlled substance documentation: Ugo reviewed; drug screen performed and appropriate; consent is reviewed and signed and on the chart.  She is aware of risk of addiction on this  medication, understands that she will need to follow up for a review every 3 months and her medications will be adjusted or decreased as deemed appropriate at each visit.  No history of drug or alcohol abuse.  No concerns about diversion or abuse. She denies side effects related to the medication.  She is aware that she may be called in for pill counts.  The dosing of this medication will be reviewed on a regular basis and reduced if possible..  Ongoing use of a controlled substance is necessary for this patient to have a normal quality of life           Orders:       22694  Drug test prsmv read direct optical obs pr date  (In-House)              CAD - Atherosclerotic heart disease of native coronary artery without angina pectorisStable.  Following with Wayne Daniels.  Just had a stress test done that was negative.  No refills needed.  Labs reviewed and UTD.        Hypothyroidism, unspecifiedStable.  Refills sent today.  Labs reviewed and UTD.        GERD - Gastro-esophageal reflux disease without esophagitisStable.  Follows with Dr. Leroy.  No refills needed.        Anxiety disorder, unspecifiedDoing better but sx control could be improved.  Rx sent to increase Lexapro to 10 mg.  Re-evaluate in 3 months.          Prescriptions:       [Refilled] escitalopram oxalate 10 mg oral tablet [take 1 tablet (10 mg) by oral route once daily], #30 (thirty) tablets, Refills: 3 (three)         Osteoporosis without current pathological fracture, age-relatedStable on Prolia.  Next DEXA due 1/2021.            Patient Recommendations:        For  Primary generalized (osteo)arthritis:    Schedule a follow-up visit in 3 months.                APPOINTMENT INFORMATION:        Monday Tuesday Wednesday Thursday Friday Saturday Sunday            Time:___________________AM  PM   Date:_____________________             Charge Capture:         Primary Diagnosis:     M15.0  Primary generalized (osteo)arthritis           Orders:       02364  Office/outpatient visit; established patient, level 4  (In-House)            APPTO  Appointment need  (In-House)              Z79.891  Long term (current) use of opiate analgesic           Orders:      74802  Drug test prsmv read direct optical obs pr date  (In-House)              I25.10  CAD - Atherosclerotic heart disease of native coronary artery without angina pectoris     E03.9  Hypothyroidism, unspecified     K21.9  GERD - Gastro-esophageal reflux disease without esophagitis     F41.9  Anxiety disorder, unspecified     M81.0  Osteoporosis without current pathological fracture, age-related

## 2021-05-18 NOTE — PROGRESS NOTES
Addie Yates 1960     Office/Outpatient Visit    Visit Date: Fri, Oct 5, 2018 10:38 am    Provider: Jammie Phillips MD (Assistant: Eugenia Oakes MA)    Location: [facility]        Electronically signed by Jammie Phillips MD on  10/05/2018 11:47:33 AM                             SUBJECTIVE:        CC:     Ms. Yates is a 58 year old White female.  patient presents today for Medicare Wellness;         HPI:     She is UTD on colonoscopy, last done 3/2015 and this was normal.  Pap smears are no longer indicated by history; s/p hysterectomy in 2004.  She is UTD on mammogram, last done 6/2018 and this was normal.  She is UTD on DEXA, last done 7/2016 and this showed osteopenia.  She is due for Shingrix, Havrix, Td, and flu.  She is due for routine labs including BMP and TSH.          She is on tramadol and gabapentin for chronic neck pain, scoliosis and generalized OA.  She takes tramadol around 1-3 times per week.  She is on gabapentin 300 mg daily.  She has been pretty well controlled on this regimen.     Ms. Yates is here for a Medicare wellness visit.  Individual and family medical history was reviewed and updated A list of current providers and suppliers reviewed and updated A current height, weight, BMI, blood pressure, and pulse were recorded in the vitals section of the note and have been reviewed A review of cognitive impairment was performed and was negative.  A review of functional ability and level of safety was performed and was negative She denies having trouble hearing the TV/radio when others do not, having to strain to hear or understand conversations and wearing hearing aid(s).  Concerning home safety, she reports that at home she DOES have handrails on stairs and functioning smoke alarms, but not throw rugs, poor lighting, a slippery bath or shower or grab bars in the bath.      Immunization Status: ** >10 years since last Td booster; ** Has not received pneumococcal vaccination; **  Has not received influenza vaccine for this season; Age>60, no shingles vaccination;     Physical Activity: Exercises at least 3 times per week; Has not had any falls or only one fall without injury in the past year.  Advance Care Directive updated today in [facility] Preventative Health updated today         PHQ-9 Depression Screening: Completed form scanned and in chart; Total Score 6 Alcohol Consumption Screening: Completed form scanned and in chart; Total Score 0     ROS:     CONSTITUTIONAL:  Negative for fatigue and fever.      EYES:  Negative for blurred vision.      E/N/T:  Negative for diminished hearing and nasal congestion.      CARDIOVASCULAR:  Negative for chest pain and palpitations.      RESPIRATORY:  Negative for recent cough and dyspnea.      GASTROINTESTINAL:  Positive for acid reflux symptoms and constipation.   Negative for abdominal pain, diarrhea, nausea or vomiting.      MUSCULOSKELETAL:  Positive for arthralgias and back pain.   Negative for myalgias.      NEUROLOGICAL:  Positive for jittery - x2 months.   Negative for paresthesias or weakness.      PSYCHIATRIC:  Positive for feelings of stress, anhedonia and sleep disturbance.   Negative for anxiety, crying spells or depression.          [facility]/:     Last Reviewed on 10/05/2018 10:54 AM by Jammie Phillips    Past Medical History: R Thoracic Outlet Syndrome    R Lateral and Medial Epicondylitis     R Carpal Tunnel    Osteoarthritis    Adenomatous Colonic Polyp    Osteopenia    Constipation, severe and chronic    DDD L-spine    DDD C-spine    Hypothyroidism    Toxic Goiter    Hiatal Hernia    Glaucoma OU    Chronic Pain, Base of Skull/Uppermost Neck     Hemorrhoids                        PAST MEDICAL HISTORY             CURRENT MEDICAL PROVIDERS:    Dentist: Jaclyn    Dermatologist: Romie    Gastroenterologist: Paulette    Obstetrician/Gynecologist: Manny    Ophthalmologist: [facility]         PAST MEDICAL HISTORY                 ADVANCED  DIRECTIVES: None         PREVENTIVE HEALTH MAINTENANCE             BONE DENSITY: was last done 7/18/16 osteopenia     COLORECTAL CANCER SCREENING: Up to date (colonoscopy q10y; sigmoidoscopy q5y; Cologuard q3y) was last done 3/26/2015, Results are in chart; colonoscopy with normal results     Hepatitis C Medicare Screening: was last done 8/2017     MAMMOGRAM: Done within last 2 years and results in are chart was last done 06/2018 with normal results     PAP SMEAR: Hysterectomy 2004         PAST MEDICAL HISTORY                 ADVANCED DIRECTIVES: None         Surgical History: Bi-tubal Ligation    R Thyroid Bx    Colonoscopy with Adenoma '07, '10, '15    EGD '08, '16    Laser Rx OU     Catheterization/Stent times one 11-15            Cholecystectomy: laparoscopic; 2017;     Hysterectomy: 2004;         Family History: NO Colon, Breast, Ovarian CA    Leukemia    Kidney Cancer    Diabetes         Social History:         Marital Status:      Children: 1 child         Tobacco/Alcohol/Supplements:     Last Reviewed on 10/05/2018 10:54 AM by Jammie Phillips    Tobacco: She has a past history of cigarette smoking; quit date:  11/2004.          Substance Abuse History:     Last Reviewed on 10/05/2018 10:54 AM by Jammie Phillips        Mental Health History:     Last Reviewed on 10/05/2018 10:54 AM by Jammie Phillips        Communicable Diseases (eg STDs):     Last Reviewed on 10/05/2018 10:54 AM by Jammie Phillips            Current Problems:     Last Reviewed on 7/03/2018 01:47 PM by Jammie Phillips    Artificial lens replacement     Scoliosis     Gastroparesis     GERD     Esophageal spasm     Hiatal hernia     Use of high risk medications     Hemorrhoids, NOS     Unspecified glaucoma     Hypothyroidism     Osteopenia     Colon polyps     Osteoarthritis, NOS     CAD         Immunizations:     None        Allergies:     Last Reviewed on 7/03/2018 01:47 PM by Jammie Phillips      No Known Drug Allergies.          Current Medications:     Last Reviewed on 7/03/2018 01:47 PM by Jammie Phillips    Gabapentin 300mg Capsules 1 capsule daily     Polyethylene Glycol 3350  Powder for Oral Solution 1 capful (17g) daily, mixed in 4-8oz of liquid daily     Synthroid 0.088mg Tablet Take 1 tablet(s) by mouth daily     Tramadol 50mg Tablet 1 po BID PRN     Atorvastatin Calcium 80mg Tablet 1 tab hs     Plavix 75mg Tablet 1 tab daily     Aspirin (ASA) 81mg Tablets, Enteric Coated 1 tab daily     Lisinopril 5mg Tablet 1 tab daily     Prolia 60mg/1ml Injection give SQ q 6months     Pantoprazole 20mg Tablets, Delayed Release Take 1 tablet(s) by mouth daily     Gas-X     Wellbutrin XL 300mg Tablets, Extended Release 1 tab daily     Colace 100mg Capsules Take 2 capsule BID for constipation     OTC Calcium 1200mg/Vitamin D3 1000IU daily         OBJECTIVE:        Vitals:         Current: 10/5/2018 10:41:43 AM    Ht:  5 ft, 3.5 in;  Wt: 133.2 lbs;  BMI: 23.2    T: 98.3 F (oral);  BP: 116/76 mm Hg (left arm, sitting);  P: 57 bpm (left arm (BP Cuff), sitting);  sCr: 0.95 mg/dL;  GFR: 59.86    VA: 20/20 OD, 20/20 OS (near, with correction)        Exams:     PHYSICAL EXAM:     GENERAL: vital signs recorded - well developed, well nourished;  well groomed;  no apparent distress;     EYES: extraocular movements intact; conjunctiva and cornea are normal; PERRLA;     E/N/T: OROPHARYNX:  normal mucosa, dentition, gingiva, and posterior pharynx;     RESPIRATORY: normal respiratory rate and pattern with no distress; normal breath sounds with no rales, rhonchi, wheezes or rubs;     CARDIOVASCULAR: normal rate; rhythm is regular;  no systolic murmur; no edema;     GASTROINTESTINAL: nontender; normal bowel sounds;     MUSCULOSKELETAL: normal gait; normal overall tone     NEUROLOGIC: mental status: alert and oriented x 3; Reflexes: brachioradialis: 2+; knee jerks: 2+;     PSYCHIATRIC: appropriate affect and demeanor; normal psychomotor function; normal speech  pattern;         Lab/Test Results:             Urine temperature:  confirmed (10/05/2018),     All urine drug screen levels confirmed negative:  yes (10/05/2018),     Performed by:  and (10/05/2018),     Collection Time:  1114 (10/05/2018),             ASSESSMENT           V70.0   Z00.00  Health checkup              DDx:     V79.0   Z13.89  Screening for depression              DDx:     244.9   E03.9  Hypothyroidism              DDx:     715.98   M15.0  Osteoarthritis, NOS              DDx:     V58.69   Z79.891  Use of high risk medications              DDx:         ORDERS:         Radiology/Test Orders:       3014F  Screening mammography results documented and reviewed (PV)1  (In-House)         3017F  Colorectal CA screen results documented and reviewed (PV)  (In-House)           Lab Orders:       57311  BMP - [facility] Basic Metabolic Panel  (Send-Out)         69237  TSH - [facility] TSH  (Send-Out)         APPTO  Appointment need  (In-House)         91352  Drug test prsmv qual dir optical obs per day  (In-House)           Procedures Ordered:         Annual wellness visit, includes a PPPS, subsequent visit  (In-House)           Other Orders:       1101F  Pt screen for fall risk; document no falls in past year or only 1 fall w/o injury in past year (EMMETT)  (In-House)           Negative EtOH screen  (In-House)           Depression screen positive and follow up plan documented  (In-House)                   PLAN:          Health checkup She is UTD on colonoscopy, last done 3/2015 and this was normal.  Pap smears are no longer indicated by history; s/p hysterectomy in 2004.  She is UTD on mammogram, last done 6/2018 and this was normal.  She is UTD on DEXA, last done 7/2016 and this showed osteopenia.  She is due for Shingrix, Havrix, Td, and flu.  She declines all vaccines today.  She is due for routine labs including BMP and TSH; ordered.  No fall risk, no memory issues.  Her depression screen is positive but she  "really reports more problems with anxiety.  Treating as below.  She lives alone.  She is able to drive and perform ADLs/manage finances independently.  Hearing is adequate.  She does not have a living will; information given on how to obtain one.  Preventive services handout and safety handout were given to her.  Current doctor list updated.  RTC 3 months.     MIPS PHQ-9 Depression Screening: Completed form scanned and in chart; Total Score 6 Positive Depression Screen: Pharmacologic intervention initiated/modified Negative alcohol screen     MAMMOGRAM: Done within last 2 years and results in are chart     COLORECTAL CANCER SCREENING: Results are in chart     FOLLOW-UP: Schedule a follow-up visit in 3 months..  f/u HTN with [place]           Orders:       1101F  Pt screen for fall risk; document no falls in past year or only 1 fall w/o injury in past year (EMMETT)  (In-House)           Negative EtOH screen  (In-House)         3014F  Screening mammography results documented and reviewed (PV)1  (In-House)         3017F  Colorectal CA screen results documented and reviewed (PV)  (In-House)         APPTO  Appointment need  (In-House)           Annual wellness visit, includes a PPPS, subsequent visit  (In-House)           Depression screen positive and follow up plan documented  (In-House)             Patient Education Handouts:       Physical Exam 50-59 year, Female           Screening for depression Checking thyroid to make sure this is not giving her problems.  If this is normal, will plan to d/c Wellbutrin as she does not feel it is helping (and may be causing side effects including \"jitteriness,\" insomnia, tachycardia) and instead start her on some buspirone.          Hypothyroidism     LABORATORY:  Labs ordered to be performed today at [facility] include.  [facility] TSH           Orders:       92948  [facility] - [facility] Basic Metabolic Panel  (Send-Out)         55447  TSH - [facility] TSH  (Send-Out)            Osteoarthritis, " NOS Stable on gabapentin and tramadol prn.  She actually has not required any tramadol in the past 2 weeks.  Uses it only when needed.  No adverse effects.  She does require ongoing use of these controlled substances to function.  No refills needed today.  Tox screen and Ugo run.          Use of high risk medications     Controlled substance documentation: Ugo reviewed; drug screen performed and appropriate; consent is reviewed and signed and on the chart.  She is aware of risk of addiction on this medication, understands that she will need to follow up for a review every 3 months and her medications will be adjusted or decreased as deemed appropriate at each visit.  No history of drug or alcohol abuse.  No concerns about diversion or abuse. She denies side effects related to the medication.  She is aware that she may be called in for pill counts.  The dosing of this medication will be reviewed on a regular basis and reduced if possible..  Ongoing use of a controlled substance is necessary for this patient to have a normal quality of life           Orders:       76325  Drug test prsmv qual dir optical obs per day  (In-House)               Patient Recommendations:        For  Health checkup:     Schedule a follow-up visit in 3 months.                APPOINTMENT INFORMATION:        Monday Tuesday Wednesday Thursday Friday Saturday Sunday            Time:___________________AM  PM   Date:_____________________             CHARGE CAPTURE           **Please note: ICD descriptions below are intended for billing purposes only and may not represent clinical diagnoses**        Primary Diagnosis:         V70.0 Health checkup            Z00.00    Encounter for general adult medical examination without abnormal findings              Orders:          1101F   Pt screen for fall risk; document no falls in past year or only 1 fall w/o injury in past year (EMMETT)  (In-House)                Negative EtOH screen   (In-House)             3014F   Screening mammography results documented and reviewed (PV)1  (In-House)             3017F   Colorectal CA screen results documented and reviewed (PV)  (In-House)             APPTO   Appointment need  (In-House)                Annual wellness visit, includes a PPPS, subsequent visit  (In-House)                Depression screen positive and follow up plan documented  (In-House)           V79.0 Screening for depression            Z13.89    Encounter for screening for other disorder    244.9 Hypothyroidism            E03.9    Hypothyroidism, unspecified    715.98 Osteoarthritis, NOS            M15.0    Primary generalized (osteo)arthritis    V58.69 Use of high risk medications            Z79.891    Long term (current) use of opiate analgesic              Orders:          13691   Drug test prsmv qual dir optical obs per day  (In-House)

## 2021-06-05 NOTE — PROGRESS NOTES
Progress Note      Patient Name: Addie Yates   Patient ID: [id number]   Sex: Female   YOB: 1960    Primary Care Provider: Jammie Phillips MD   Referring Provider: Jammie Phillips MD    Visit Date: May 17, 2021    Provider: KEVON De La Garza   [facility]   Location Address: [address] [place] [address], [address]  [place], [place]  [id number]   Location Phone: [phone]          Chief Complaint  · Follow up Constipation      History Of Present Illness     Ms. Yates presents for f/u of constipation and gastroparesis.      She's continuing to experience abdominal bloating and belching.  She notices this more when eating salad and raw fruits.  She reports intermittent epigastric pain that is improved with lying on stomach or stretching.      Constipation is improved.  She's having a more complete bowel movement when she has a bowel movement.   Only having a bowel movement every 1 1/2 weeks.      Has another procedure this week at [facility] with colorectal surgery secondary to condyloma.                 Past Medical History  Arthritis; Carpal tunnel syndrome; GERD; Heart Attack (MI); Medial epicondylitis, right elbow; Occipital neuralgia; Pain, Arm; Pain, Back; Pain, Cervical Spine; Pain, Chronic Pain Syndrome; Pain, Generalized; Pain, Head; Pain, Leg; Pain, Thoracic Spine; Pneumonia; Thoracic outlet syndrome; Thyroid Disease; Vision Problems         Past Surgical History  cardiac stents; Cataract surgery; Cholecystecomy; Colonoscopy; EGD; Glaucoma surgery; Hysterectomy; Tubal ligation; Tumor removal         Medication List  aspirin 81 mg oral tablet,delayed release (DR/EC); atorvastatin 80 mg oral tablet; Calcium 500 + D (D3) 500 mg(1,250mg) -125 unit oral tablet; diclofenac sodium 75 mg oral tablet,delayed release (DR/EC); duloxetine 60 mg oral capsule,delayed release(DR/EC); gabapentin 300 mg oral capsule; Gas-X Extra Strength 125 mg oral capsule; levothyroxine 88 mcg Oral  "tablet; lisinopril 5 mg oral tablet; Motegrity 2 mg oral tablet; pantoprazole 40 mg oral tablet,delayed release (DR/EC); Prolia 60 mg/mL subcutaneous syringe; tramadol 50 mg oral tablet         Allergy List  NO KNOWN DRUG ALLERGIES       Allergies Reconciled  Family Medical History  Family history of breast cancer; Family history of kidney cancer; Family history of heart attack; Family history of malignant neoplasm of ureter         Social History  Alcohol (Former); Tobacco (Former)         Review of Systems  · Constitutional  o Denies  o : chills, fever  · Cardiovascular  o Denies  o : chest pain, irregular heart beats  · Respiratory  o Denies  o : cough, shortness of breath  · Gastrointestinal  o Admits  o : see HPI   · Endocrine  o Denies  o : weight gain, weight loss      Vitals  Date Time BP Position Site L\R Cuff Size HR RR TEMP (F) WT  HT  BMI kg/m2 BSA m2 O2 Sat FR L/min FiO2 HC       05/17/2021 11:16 /79 Sitting    57 - R  97.4 135lbs 4oz 5'  4\" 23.22 1.66             Physical Examination  · Constitutional  o Appearance  o : Healthy-appearing, awake and alert in no acute distress  · Head and Face  o Head  o : Normocephalic with no worriesome skin lesions  · Eyes  o Vision  o :   § Visual Fields  § : eyes move symmetrical in all directions  o Sclerae  o : sclerae anicteric  o Pupils and Irises  o : pupils equal and symmetrical  · Neck  o Inspection/Palpation  o : Trachea is midline, no adenopathy  · Respiratory  o Respiratory Effort  o : Breathing is unlabored.  o Inspection of Chest  o : normal appearance  o Auscultation of Lungs  o : Chest is clear to auscultation bilaterally.  · Cardiovascular  o Heart  o :   § Auscultation of Heart  § : no murmurs, rubs, or gallops  o Peripheral Vascular System  o :   § Extremities  § : no cyanosis, clubbing or edema;   · Gastrointestinal  o Abdominal Examination  o : Abdomen is soft, nontender to palpation, with normal active bowel sounds, no appreciable " hepatosplenomegaly.  o Digital Rectal Exam  o : deferred  · Skin and Subcutaneous Tissue  o General Inspection  o : without focal lesions; turgor is normal  · Psychiatric  o General  o : Alert and oriented x3  o Mood and Affect  o : Mood and affect are appropriate to circumstances  · Extremities  o Extremities  o : No edema, no cyanosis          Assessment  · Constipation     564.00/K59.00  · Gastroparesis     536.3/K31.84  · Hernia, Hiatal     553.3/K44.9      Plan  · Medications  o Motegrity 2 mg oral tablet   SIG: take 1 tablet (2 mg) by oral route once daily for 90 days   DISP: (90) Tablet with 1 refills  Adjusted on 05/17/2021     o pantoprazole 40 mg oral tablet,delayed release (DR/EC)   SIG: take 1 tablet (40 mg) by oral route once daily for 90 days   DISP: (90) Tablet with 2 refills  Refilled on 05/17/2021     o Medications have been Reconciled  o Transition of Care or Provider Policy  · Instructions  o Information given on current diagnoses. Get most recent records from [facility].  · Disposition  o Follow up 6 months            Electronically Signed by: Loren Caldwell APRN -Author on May 17, 2021 12:10:36 PM

## 2021-07-01 VITALS
SYSTOLIC BLOOD PRESSURE: 110 MMHG | WEIGHT: 132.8 LBS | TEMPERATURE: 98.6 F | DIASTOLIC BLOOD PRESSURE: 60 MMHG | BODY MASS INDEX: 22.67 KG/M2 | HEIGHT: 64 IN | HEART RATE: 54 BPM

## 2021-07-01 VITALS
HEART RATE: 75 BPM | DIASTOLIC BLOOD PRESSURE: 83 MMHG | TEMPERATURE: 98.1 F | WEIGHT: 133.2 LBS | BODY MASS INDEX: 22.74 KG/M2 | HEIGHT: 64 IN | SYSTOLIC BLOOD PRESSURE: 111 MMHG

## 2021-07-01 VITALS
TEMPERATURE: 98.3 F | HEIGHT: 64 IN | BODY MASS INDEX: 22.74 KG/M2 | WEIGHT: 133.2 LBS | DIASTOLIC BLOOD PRESSURE: 76 MMHG | HEART RATE: 57 BPM | SYSTOLIC BLOOD PRESSURE: 116 MMHG

## 2021-07-01 VITALS
WEIGHT: 129.9 LBS | TEMPERATURE: 97.5 F | BODY MASS INDEX: 22.18 KG/M2 | DIASTOLIC BLOOD PRESSURE: 76 MMHG | HEART RATE: 68 BPM | HEIGHT: 64 IN | SYSTOLIC BLOOD PRESSURE: 111 MMHG

## 2021-07-01 VITALS
WEIGHT: 135.2 LBS | HEIGHT: 64 IN | SYSTOLIC BLOOD PRESSURE: 150 MMHG | HEART RATE: 56 BPM | DIASTOLIC BLOOD PRESSURE: 91 MMHG | BODY MASS INDEX: 23.08 KG/M2 | TEMPERATURE: 97.5 F

## 2021-07-01 VITALS
SYSTOLIC BLOOD PRESSURE: 119 MMHG | HEIGHT: 64 IN | WEIGHT: 133 LBS | TEMPERATURE: 96.9 F | BODY MASS INDEX: 22.71 KG/M2 | HEART RATE: 70 BPM | DIASTOLIC BLOOD PRESSURE: 77 MMHG

## 2021-07-01 VITALS
WEIGHT: 138 LBS | SYSTOLIC BLOOD PRESSURE: 116 MMHG | BODY MASS INDEX: 23.56 KG/M2 | TEMPERATURE: 97.5 F | DIASTOLIC BLOOD PRESSURE: 76 MMHG | HEART RATE: 58 BPM | HEIGHT: 64 IN

## 2021-07-02 VITALS
TEMPERATURE: 97.4 F | HEIGHT: 64 IN | DIASTOLIC BLOOD PRESSURE: 72 MMHG | HEART RATE: 61 BPM | BODY MASS INDEX: 23.52 KG/M2 | SYSTOLIC BLOOD PRESSURE: 115 MMHG | WEIGHT: 137.8 LBS

## 2021-07-02 VITALS
DIASTOLIC BLOOD PRESSURE: 70 MMHG | HEART RATE: 69 BPM | TEMPERATURE: 98 F | SYSTOLIC BLOOD PRESSURE: 116 MMHG | WEIGHT: 130.6 LBS | BODY MASS INDEX: 22.3 KG/M2 | HEIGHT: 64 IN

## 2021-07-02 VITALS — HEIGHT: 64 IN | BODY MASS INDEX: 22.67 KG/M2 | TEMPERATURE: 98.4 F

## 2021-07-02 VITALS
DIASTOLIC BLOOD PRESSURE: 74 MMHG | SYSTOLIC BLOOD PRESSURE: 109 MMHG | BODY MASS INDEX: 23.29 KG/M2 | WEIGHT: 136.4 LBS | TEMPERATURE: 97.6 F | HEIGHT: 64 IN | HEART RATE: 68 BPM

## 2021-07-15 VITALS
TEMPERATURE: 97.4 F | HEART RATE: 57 BPM | HEIGHT: 64 IN | DIASTOLIC BLOOD PRESSURE: 79 MMHG | BODY MASS INDEX: 23.09 KG/M2 | WEIGHT: 135.25 LBS | SYSTOLIC BLOOD PRESSURE: 116 MMHG

## 2021-07-20 ENCOUNTER — OFFICE VISIT (OUTPATIENT)
Dept: FAMILY MEDICINE CLINIC | Age: 61
End: 2021-07-20

## 2021-07-20 VITALS
TEMPERATURE: 98.8 F | HEIGHT: 64 IN | SYSTOLIC BLOOD PRESSURE: 113 MMHG | DIASTOLIC BLOOD PRESSURE: 86 MMHG | BODY MASS INDEX: 22.81 KG/M2 | WEIGHT: 133.6 LBS | HEART RATE: 76 BPM

## 2021-07-20 DIAGNOSIS — F41.9 ANXIETY: ICD-10-CM

## 2021-07-20 DIAGNOSIS — E78.2 MIXED HYPERLIPIDEMIA: ICD-10-CM

## 2021-07-20 DIAGNOSIS — E03.9 ACQUIRED HYPOTHYROIDISM: ICD-10-CM

## 2021-07-20 DIAGNOSIS — M15.9 GENERALIZED OSTEOARTHRITIS: Primary | ICD-10-CM

## 2021-07-20 DIAGNOSIS — Z79.899 HIGH RISK MEDICATION USE: ICD-10-CM

## 2021-07-20 DIAGNOSIS — M81.0 AGE-RELATED OSTEOPOROSIS WITHOUT CURRENT PATHOLOGICAL FRACTURE: ICD-10-CM

## 2021-07-20 DIAGNOSIS — I25.10 CORONARY ARTERY DISEASE INVOLVING NATIVE CORONARY ARTERY OF NATIVE HEART WITHOUT ANGINA PECTORIS: ICD-10-CM

## 2021-07-20 DIAGNOSIS — I10 ESSENTIAL HYPERTENSION: ICD-10-CM

## 2021-07-20 PROBLEM — K58.9 IRRITABLE BOWEL SYNDROME: Status: ACTIVE | Noted: 2020-09-28

## 2021-07-20 PROBLEM — E78.5 HYPERLIPIDEMIA: Status: ACTIVE | Noted: 2021-06-28

## 2021-07-20 PROBLEM — H40.9 GLAUCOMA: Status: ACTIVE | Noted: 2020-09-28

## 2021-07-20 PROBLEM — K21.9 GASTROESOPHAGEAL REFLUX DISEASE: Status: ACTIVE | Noted: 2021-07-20

## 2021-07-20 PROBLEM — K44.9 HIATAL HERNIA: Status: ACTIVE | Noted: 2021-07-20

## 2021-07-20 PROBLEM — K22.4 ESOPHAGEAL SPASM: Status: ACTIVE | Noted: 2021-07-20

## 2021-07-20 PROBLEM — M15.0 PRIMARY GENERALIZED HYPERTROPHIC OSTEOARTHROSIS: Status: ACTIVE | Noted: 2021-07-20

## 2021-07-20 LAB
AMPHET+METHAMPHET UR QL: NEGATIVE
AMPHETAMINES UR QL: NEGATIVE
BARBITURATES UR QL SCN: NEGATIVE
BENZODIAZ UR QL SCN: NEGATIVE
BUPRENORPHINE SERPL-MCNC: NEGATIVE NG/ML
CANNABINOIDS SERPL QL: NEGATIVE
COCAINE UR QL: NEGATIVE
EXPIRATION DATE: NORMAL
Lab: NORMAL
MDMA UR QL SCN: NEGATIVE
METHADONE UR QL SCN: NEGATIVE
OPIATES UR QL: NEGATIVE
OXYCODONE UR QL SCN: NEGATIVE
PCP UR QL SCN: NEGATIVE

## 2021-07-20 PROCEDURE — 99214 OFFICE O/P EST MOD 30 MIN: CPT | Performed by: FAMILY MEDICINE

## 2021-07-20 PROCEDURE — 80305 DRUG TEST PRSMV DIR OPT OBS: CPT | Performed by: FAMILY MEDICINE

## 2021-07-20 RX ORDER — BUSPIRONE HYDROCHLORIDE 5 MG/1
5 TABLET ORAL DAILY
Qty: 30 TABLET | Refills: 5 | Status: SHIPPED | OUTPATIENT
Start: 2021-07-20 | End: 2022-01-05

## 2021-07-20 RX ORDER — SIMETHICONE 125 MG
1 CAPSULE ORAL AS NEEDED
COMMUNITY

## 2021-07-20 RX ORDER — PRUCALOPRIDE 2 MG/1
1 TABLET, FILM COATED ORAL DAILY
COMMUNITY
Start: 2021-05-27 | End: 2021-11-24 | Stop reason: SDUPTHER

## 2021-07-20 RX ORDER — LISINOPRIL 5 MG/1
1 TABLET ORAL NIGHTLY
COMMUNITY
Start: 2021-02-10

## 2021-07-20 RX ORDER — DICLOFENAC SODIUM 75 MG/1
75 TABLET, DELAYED RELEASE ORAL 2 TIMES DAILY PRN
COMMUNITY
Start: 2021-06-21 | End: 2022-08-10

## 2021-07-20 RX ORDER — DULOXETIN HYDROCHLORIDE 60 MG/1
60 CAPSULE, DELAYED RELEASE ORAL DAILY
COMMUNITY
Start: 2021-07-10 | End: 2021-11-01

## 2021-07-20 RX ORDER — DOCUSATE SODIUM 100 MG/1
100 CAPSULE, LIQUID FILLED ORAL 2 TIMES DAILY
COMMUNITY

## 2021-07-20 RX ORDER — ASPIRIN 81 MG/1
1 TABLET ORAL NIGHTLY
COMMUNITY

## 2021-07-20 RX ORDER — DENOSUMAB 60 MG/ML
1 INJECTION SUBCUTANEOUS
COMMUNITY
End: 2022-03-18 | Stop reason: SDUPTHER

## 2021-07-20 RX ORDER — ATORVASTATIN CALCIUM 80 MG/1
1 TABLET, FILM COATED ORAL DAILY
COMMUNITY
Start: 2021-04-09

## 2021-07-20 RX ORDER — LEVOTHYROXINE SODIUM 0.1 MG/1
100 TABLET ORAL DAILY
COMMUNITY
Start: 2021-06-09 | End: 2021-11-04 | Stop reason: SDUPTHER

## 2021-07-20 RX ORDER — GABAPENTIN 300 MG/1
300 CAPSULE ORAL 2 TIMES DAILY
COMMUNITY
Start: 2021-07-09 | End: 2022-08-10

## 2021-07-20 RX ORDER — TRAMADOL HYDROCHLORIDE 50 MG/1
50 TABLET ORAL 2 TIMES DAILY PRN
COMMUNITY
Start: 2021-07-09 | End: 2021-08-10

## 2021-07-20 NOTE — PROGRESS NOTES
Chief Complaint  Osteoarthritis (3 month follow up )    Subjective          Addie Yates presents to [facility] today for routine follow-up on chronic issues.    She is on tramadol, diclofenac and gabapentin for chronic neck and back pain d/t generalized OA and scoliosis.  She takes the gabapentin 300 mg twice a day and the tramadol typically once a day.  Denies adverse effects.  She follows with Dr. Simon at [facility].  She has been getting injections but without much relief.  She is going for a cervical block tomorrow, actually.      She is on duloxetine 60 mg for anxiety.  We switched over to see if she could get control in her anxiety as well as her back pain.  Her anxiety has been worse lately.        She is on lisinopril, atorvastatin, and ASA/Plavix for CAD s/p stenting in 11/2015.  Following with Wayne Daniels (goes to see him on Friday).  Denies CP, palpitations, SOB.      She is on levothyroxine for hypothyroidism.  Denies heat/cold intolerance, changes in hair or skin.      She is on Protonix for GERD and esophageal spasm.  Following with Dr. Leroy.  Her last EGD showed hiatal hernia and gastritis.  Gastric emptying study has shown gastroparesis.  She is on Colace, Miralax, and lactulose for constipation.      She is on Prolia for osteoporosis.  DEXA is UTD, done 2/2021 showed osteopenia.  Hips are improved.      Current Outpatient Medications:   •  aspirin (aspirin) 81 MG EC tablet, Take 1 tablet by mouth Daily., Disp: , Rfl:   •  atorvastatin (LIPITOR) 80 MG tablet, Take 1 tablet by mouth Daily., Disp: , Rfl:   •  Calcium 500-125 MG-UNIT tablet, Take 1 tablet by mouth Daily., Disp: , Rfl:   •  denosumab (Prolia) 60 MG/ML solution prefilled syringe syringe, Inject 1 mL under the skin into the appropriate area as directed Every 6 (Six) Months., Disp: , Rfl:   •  diclofenac (VOLTAREN) 75 MG EC tablet, Take 75 mg by mouth 2 (Two) Times a Day As Needed.,  "Disp: , Rfl:   •  docusate sodium (COLACE) 100 MG capsule, Take 100 mg by mouth 2 (Two) Times a Day., Disp: , Rfl:   •  DULoxetine (CYMBALTA) 60 MG capsule, Take 60 mg by mouth Daily., Disp: , Rfl:   •  gabapentin (NEURONTIN) 300 MG capsule, Take 300 mg by mouth 2 (Two) Times a Day., Disp: , Rfl:   •  levothyroxine (SYNTHROID, LEVOTHROID) 100 MCG tablet, Take 100 mcg by mouth Daily., Disp: , Rfl:   •  lisinopril (PRINIVIL,ZESTRIL) 5 MG tablet, Take 1 tablet by mouth Daily., Disp: , Rfl:   •  Prucalopride Succinate (Motegrity) 2 MG tablet, Take 1 tablet by mouth Daily., Disp: , Rfl:   •  simethicone (Gas-X Extra Strength) 125 MG capsule capsule, Take 1 capsule by mouth 3 (Three) Times a Day With Meals., Disp: , Rfl:   •  traMADol (ULTRAM) 50 MG tablet, Take 50 mg by mouth 2 (Two) Times a Day As Needed., Disp: , Rfl:   •  busPIRone (BUSPAR) 5 MG tablet, Take 1 tablet by mouth Daily., Disp: 30 tablet, Rfl: 5    Allergies:  Patient has no known allergies.      Objective   Vital Signs:   /86 (BP Location: Left arm, Patient Position: Sitting)   Pulse 76   Temp 98.8 °F (37.1 °C) (Oral)   Ht 162.6 cm (64\")   Wt 60.6 kg (133 lb 9.6 oz)   BMI 22.93 kg/m²     Physical Exam  Vitals reviewed.   Constitutional:       General: She is not in acute distress.     Appearance: Normal appearance. She is well-developed.   HENT:      Head: Normocephalic and atraumatic.      Right Ear: External ear normal.      Left Ear: External ear normal.      Nose: Nose normal.      Mouth/Throat:      Mouth: Mucous membranes are moist.   Eyes:      Extraocular Movements: Extraocular movements intact.      Conjunctiva/sclera: Conjunctivae normal.      Pupils: Pupils are equal, round, and reactive to light.   Cardiovascular:      Rate and Rhythm: Normal rate and regular rhythm.      Heart sounds: No murmur heard.     Pulmonary:      Effort: Pulmonary effort is normal.      Breath sounds: Normal breath sounds. No wheezing, rhonchi or rales. "   Abdominal:      General: Bowel sounds are normal. There is no distension.      Palpations: Abdomen is soft.      Tenderness: There is no abdominal tenderness.   Musculoskeletal:         General: Normal range of motion.   Neurological:      Mental Status: She is alert.   Psychiatric:         Mood and Affect: Affect normal.             Assessment and Plan    Diagnoses and all orders for this visit:    1. Generalized osteoarthritis (Primary)  Assessment & Plan:  Stable on current regimen.  Symptoms are well controlled.  No adverse effects. She does require ongoing use of this controlled substance to function.  Tox screen was due today.  Prior tox screen appropriate.  Ugo was run today.  Refills were not needed today.  RTC 3 months.        2. High risk medication use  -     POC Urine Drug Screen Premier Bio-Cup    3. Anxiety  Assessment & Plan:  Worsened anxiety over the past few months.  She is currently on duloxetine 60 mg daily.  No changes to this.  However, we will add in buspirone 5 mg daily and see how she does with this.  Prescription sent.    Orders:  -     busPIRone (BUSPAR) 5 MG tablet; Take 1 tablet by mouth Daily.  Dispense: 30 tablet; Refill: 5    4. Essential hypertension  Assessment & Plan:  Stable.  No refills needed.  Labs reviewed and up-to-date.      5. Mixed hyperlipidemia  Assessment & Plan:  Stable.  No refills needed.  Labs reviewed and up-to-date.      6. Coronary artery disease involving native coronary artery of native heart without angina pectoris    7. Acquired hypothyroidism  Assessment & Plan:  Stable.  No refills needed.  Labs reviewed and up-to-date.      8. Age-related osteoporosis without current pathological fracture      Follow Up   Return in about 3 months (around 10/20/2021) for Medicare Wellness.  Patient was given instructions and counseling regarding her condition or for health maintenance advice. Please see specific information pulled into the AVS if appropriate.

## 2021-07-20 NOTE — ASSESSMENT & PLAN NOTE
Worsened anxiety over the past few months.  She is currently on duloxetine 60 mg daily.  No changes to this.  However, we will add in buspirone 5 mg daily and see how she does with this.  Prescription sent.

## 2021-07-20 NOTE — ASSESSMENT & PLAN NOTE
Stable on current regimen.  Symptoms are well controlled.  No adverse effects. She does require ongoing use of this controlled substance to function.  Tox screen was due today.  Prior tox screen appropriate.  Ugo was run today.  Refills were not needed today.  RTC 3 months.

## 2021-07-22 ENCOUNTER — HOSPITAL ENCOUNTER (OUTPATIENT)
Dept: GENERAL RADIOLOGY | Facility: HOSPITAL | Age: 61
Discharge: HOME | End: 2021-07-22
Admitting: DERMATOLOGY

## 2021-07-22 ENCOUNTER — TRANSCRIBE ORDERS (OUTPATIENT)
Dept: ADMINISTRATIVE | Facility: HOSPITAL | Age: 61
End: 2021-07-22

## 2021-07-22 ENCOUNTER — TELEPHONE (OUTPATIENT)
Dept: FAMILY MEDICINE CLINIC | Age: 61
End: 2021-07-22

## 2021-07-22 DIAGNOSIS — D48.5 NEOPLASM OF UNCERTAIN BEHAVIOR OF SKIN: ICD-10-CM

## 2021-07-22 DIAGNOSIS — L82.1 OTHER SEBORRHEIC KERATOSIS: Primary | ICD-10-CM

## 2021-07-22 DIAGNOSIS — L82.1 OTHER SEBORRHEIC KERATOSIS: ICD-10-CM

## 2021-07-22 DIAGNOSIS — Z87.2: ICD-10-CM

## 2021-07-22 PROCEDURE — 71046 X-RAY EXAM CHEST 2 VIEWS: CPT | Performed by: RADIOLOGY

## 2021-07-22 PROCEDURE — 71046 X-RAY EXAM CHEST 2 VIEWS: CPT

## 2021-07-22 NOTE — TELEPHONE ENCOUNTER
Caller: Addie Yates    Relationship: Self    Best call back number: [phone]     What orders are you requesting (i.e. lab or imaging): FULL BLOOD WORKUP    In what timeframe would the patient need to come in: BEFORE 8/23    Where will you receive your lab/imaging services: [facility]    Additional notes: PATIENT STATED SHE IS GETTING A PROLEA SHOT AT [facility], BUT THEY INFORMED HER THEY WOULD NEED HER TO HAVE BLOOD WORK DONE BEFOREHAND IN ORDER TO GET IT. PLEASE CALL PATIENT WHEN ORDERS ARE READY

## 2021-07-23 NOTE — TELEPHONE ENCOUNTER
She had a complete panel of blood work done here on 4/15/2021.  That should be adequate for getting her Prolia injection.  Can we just give her a printout of those labs and she can take them over to [place] so she does not have to get stuck again?  Thanks, MASON

## 2021-08-03 ENCOUNTER — TELEPHONE (OUTPATIENT)
Dept: FAMILY MEDICINE CLINIC | Age: 61
End: 2021-08-03

## 2021-08-03 NOTE — TELEPHONE ENCOUNTER
Caller: Addie Yates    Relationship: Self    Best call back number: [phone]    What is the best time to reach you: ANYTIME    Who are you requesting to speak with (clinical staff, provider,  specific staff member): MEDICAL STAFF    What was the call regarding: PATIENT MISSED A CALL FROM THE OFFICE. ATTEMPTED TO WARM TRANSFER. PLEASE CALL PATIENT TO ADVISE.

## 2021-08-10 DIAGNOSIS — M15.9 GENERALIZED OSTEOARTHRITIS: Primary | ICD-10-CM

## 2021-08-10 RX ORDER — TRAMADOL HYDROCHLORIDE 50 MG/1
TABLET ORAL
Qty: 60 TABLET | Refills: 2 | Status: SHIPPED | OUTPATIENT
Start: 2021-08-10 | End: 2021-11-12

## 2021-08-23 ENCOUNTER — TELEPHONE (OUTPATIENT)
Dept: FAMILY MEDICINE CLINIC | Age: 61
End: 2021-08-23

## 2021-08-23 DIAGNOSIS — Z12.31 ENCOUNTER FOR SCREENING MAMMOGRAM FOR MALIGNANT NEOPLASM OF BREAST: Primary | ICD-10-CM

## 2021-09-23 ENCOUNTER — HOSPITAL ENCOUNTER (OUTPATIENT)
Dept: MAMMOGRAPHY | Facility: HOSPITAL | Age: 61
Discharge: HOME | End: 2021-09-23
Admitting: FAMILY MEDICINE

## 2021-09-23 DIAGNOSIS — Z12.31 ENCOUNTER FOR SCREENING MAMMOGRAM FOR MALIGNANT NEOPLASM OF BREAST: ICD-10-CM

## 2021-09-23 PROCEDURE — 77063 BREAST TOMOSYNTHESIS BI: CPT

## 2021-09-23 PROCEDURE — 77067 SCR MAMMO BI INCL CAD: CPT

## 2021-11-01 RX ORDER — DULOXETIN HYDROCHLORIDE 60 MG/1
CAPSULE, DELAYED RELEASE ORAL
Qty: 30 CAPSULE | Refills: 0 | Status: SHIPPED | OUTPATIENT
Start: 2021-11-01 | End: 2021-12-06

## 2021-11-03 ENCOUNTER — OFFICE VISIT (OUTPATIENT)
Dept: FAMILY MEDICINE CLINIC | Age: 61
End: 2021-11-03

## 2021-11-03 ENCOUNTER — LAB (OUTPATIENT)
Dept: LAB | Facility: HOSPITAL | Age: 61
End: 2021-11-03

## 2021-11-03 VITALS
SYSTOLIC BLOOD PRESSURE: 121 MMHG | WEIGHT: 137.6 LBS | HEIGHT: 64 IN | HEART RATE: 68 BPM | TEMPERATURE: 98.8 F | DIASTOLIC BLOOD PRESSURE: 74 MMHG | BODY MASS INDEX: 23.49 KG/M2

## 2021-11-03 DIAGNOSIS — I10 ESSENTIAL HYPERTENSION: ICD-10-CM

## 2021-11-03 DIAGNOSIS — M79.605 PAIN IN BOTH LOWER EXTREMITIES: ICD-10-CM

## 2021-11-03 DIAGNOSIS — M15.9 GENERALIZED OSTEOARTHRITIS: ICD-10-CM

## 2021-11-03 DIAGNOSIS — Z23 ENCOUNTER FOR IMMUNIZATION: ICD-10-CM

## 2021-11-03 DIAGNOSIS — M79.604 PAIN IN BOTH LOWER EXTREMITIES: ICD-10-CM

## 2021-11-03 DIAGNOSIS — Z00.00 ANNUAL PHYSICAL EXAM: Primary | ICD-10-CM

## 2021-11-03 DIAGNOSIS — I73.9 VASCULAR CLAUDICATION (HCC): ICD-10-CM

## 2021-11-03 DIAGNOSIS — E03.9 ACQUIRED HYPOTHYROIDISM: ICD-10-CM

## 2021-11-03 DIAGNOSIS — Z79.899 HIGH RISK MEDICATION USE: ICD-10-CM

## 2021-11-03 LAB
ALBUMIN SERPL-MCNC: 4.4 G/DL (ref 3.5–5.2)
ALBUMIN/GLOB SERPL: 2 G/DL
ALP SERPL-CCNC: 83 U/L (ref 39–117)
ALT SERPL W P-5'-P-CCNC: 45 U/L (ref 1–33)
AMPHET+METHAMPHET UR QL: NEGATIVE
AMPHETAMINES UR QL: NEGATIVE
ANION GAP SERPL CALCULATED.3IONS-SCNC: 4.8 MMOL/L (ref 5–15)
AST SERPL-CCNC: 41 U/L (ref 1–32)
BARBITURATES UR QL SCN: NEGATIVE
BENZODIAZ UR QL SCN: NEGATIVE
BILIRUB SERPL-MCNC: 0.3 MG/DL (ref 0–1.2)
BUN SERPL-MCNC: 24 MG/DL (ref 8–23)
BUN/CREAT SERPL: 28.6 (ref 7–25)
BUPRENORPHINE SERPL-MCNC: NEGATIVE NG/ML
CALCIUM SPEC-SCNC: 8.9 MG/DL (ref 8.6–10.5)
CANNABINOIDS SERPL QL: NEGATIVE
CHLORIDE SERPL-SCNC: 103 MMOL/L (ref 98–107)
CHOLEST SERPL-MCNC: 146 MG/DL (ref 0–200)
CO2 SERPL-SCNC: 30.2 MMOL/L (ref 22–29)
COCAINE UR QL: NEGATIVE
CREAT SERPL-MCNC: 0.84 MG/DL (ref 0.57–1)
EXPIRATION DATE: NORMAL
GFR SERPL CREATININE-BSD FRML MDRD: 69 ML/MIN/1.73
GLOBULIN UR ELPH-MCNC: 2.2 GM/DL
GLUCOSE SERPL-MCNC: 101 MG/DL (ref 65–99)
HDLC SERPL-MCNC: 66 MG/DL (ref 40–60)
LDLC SERPL CALC-MCNC: 69 MG/DL (ref 0–100)
LDLC/HDLC SERPL: 1.07 {RATIO}
Lab: NORMAL
MDMA UR QL SCN: NEGATIVE
METHADONE UR QL SCN: NEGATIVE
OPIATES UR QL: NEGATIVE
OXYCODONE UR QL SCN: NEGATIVE
PCP UR QL SCN: NEGATIVE
POTASSIUM SERPL-SCNC: 4.3 MMOL/L (ref 3.5–5.2)
PROT SERPL-MCNC: 6.6 G/DL (ref 6–8.5)
SODIUM SERPL-SCNC: 138 MMOL/L (ref 136–145)
TRIGL SERPL-MCNC: 48 MG/DL (ref 0–150)
TSH SERPL DL<=0.05 MIU/L-ACNC: 12.9 UIU/ML (ref 0.27–4.2)
VLDLC SERPL-MCNC: 11 MG/DL (ref 5–40)

## 2021-11-03 PROCEDURE — 90686 IIV4 VACC NO PRSV 0.5 ML IM: CPT | Performed by: FAMILY MEDICINE

## 2021-11-03 PROCEDURE — 80061 LIPID PANEL: CPT

## 2021-11-03 PROCEDURE — 84443 ASSAY THYROID STIM HORMONE: CPT

## 2021-11-03 PROCEDURE — 36415 COLL VENOUS BLD VENIPUNCTURE: CPT

## 2021-11-03 PROCEDURE — G0008 ADMIN INFLUENZA VIRUS VAC: HCPCS | Performed by: FAMILY MEDICINE

## 2021-11-03 PROCEDURE — G0439 PPPS, SUBSEQ VISIT: HCPCS | Performed by: FAMILY MEDICINE

## 2021-11-03 PROCEDURE — 80305 DRUG TEST PRSMV DIR OPT OBS: CPT | Performed by: FAMILY MEDICINE

## 2021-11-03 PROCEDURE — 1159F MED LIST DOCD IN RCRD: CPT | Performed by: FAMILY MEDICINE

## 2021-11-03 PROCEDURE — 1170F FXNL STATUS ASSESSED: CPT | Performed by: FAMILY MEDICINE

## 2021-11-03 PROCEDURE — 80053 COMPREHEN METABOLIC PANEL: CPT

## 2021-11-03 PROCEDURE — 99213 OFFICE O/P EST LOW 20 MIN: CPT | Performed by: FAMILY MEDICINE

## 2021-11-03 RX ORDER — PANTOPRAZOLE SODIUM 40 MG/1
1 TABLET, DELAYED RELEASE ORAL DAILY
COMMUNITY
Start: 2021-08-14 | End: 2021-11-24

## 2021-11-03 NOTE — ASSESSMENT & PLAN NOTE
She is UTD on colonoscopy, last done 9/2020 and this showed diverticulosis as well as a rectal mass.  She had that removed by a colorectal surgeon, dxed as HSIL lesion.  She is following with [facility]orectal [facility].  She goes back to see them in December.  Pap smear is no longer indicated by history; s/p hysterectomy.  She is UTD on mammogram, last done 9/2021 and this was normal.  She is UTD on DEXA, last done 2/2021 and this showed osteopenia.  She is UTD on COVID (Moderna), Tdap (5/2016).  She is due for Shingrix and flu.  Flu shot given today.  Shingrix can be done at the pharmacy.  She is due for routine labs including HTN panel and TSH; ordered.

## 2021-11-03 NOTE — PROGRESS NOTES
The ABCs of the Annual Wellness Visit  Subsequent Medicare Wellness Visit    Chief Complaint   Patient presents with   • Medicare Wellness-subsequent      Subjective    History of Present Illness:  Addie Yates is a 61 y.o. female who presents for a Subsequent Medicare Wellness Visit.    She is UTD on colonoscopy, last done 9/2020 and this showed diverticulosis as well as a rectal mass.  She had that removed by a colorectal surgeon, dxed as HSIL lesion.  She is following with Colorectal Surg.  She goes back to see them in December.  Pap smear is no longer indicated by history; s/p hysterectomy.  She is UTD on mammogram, last done 9/2021 and this was normal.  She is UTD on DEXA, last done 2/2021 and this showed osteopenia.  She is UTD on COVID (Moderna), Tdap (5/2016).  She is due for Shingrix and flu.  She is due for routine labs including HTN panel and TSH.      She is on tramadol, duloxetine, diclofenac and gabapentin for history of chronic neck and back pain secondary to diffuse OA and scoliosis.  She is following with Dr. Simon at [facility].  Dr. Simon is managing the gabapentin and I have been refilling her tramadol at number #60 a month.     She denies adverse effects.    Status post PT without any good relief.  She is having pain from the knees down with walking.  It typically takes a little bit (although not much) for the pain to kick in, but then it worsens the longer she keeps walking.      The following portions of the patient's history were reviewed and   updated as appropriate: allergies, current medications, past family history, past medical history, past social history, past surgical history and problem list.    Compared to one year ago, the patient feels her physical   health is worse.    Compared to one year ago, the patient feels her mental   health is worse.    Recent Hospitalizations:  She was not admitted to the hospital during the last year.       Current Medical  Providers:  Patient Care Team:  Jammie Phillips MD as PCP - General (Family Medicine)    Outpatient Medications Prior to Visit   Medication Sig Dispense Refill   • aspirin (aspirin) 81 MG EC tablet Take 1 tablet by mouth Daily.     • atorvastatin (LIPITOR) 80 MG tablet Take 1 tablet by mouth Daily.     • busPIRone (BUSPAR) 5 MG tablet Take 1 tablet by mouth Daily. 30 tablet 5   • Calcium 500-125 MG-UNIT tablet Take 1 tablet by mouth Daily.     • denosumab (Prolia) 60 MG/ML solution prefilled syringe syringe Inject 1 mL under the skin into the appropriate area as directed Every 6 (Six) Months.     • diclofenac (VOLTAREN) 75 MG EC tablet Take 75 mg by mouth 2 (Two) Times a Day As Needed.     • docusate sodium (COLACE) 100 MG capsule Take 100 mg by mouth 2 (Two) Times a Day.     • DULoxetine (CYMBALTA) 60 MG capsule TAKE ONE CAPSULE BY MOUTH EVERY DAY 30 capsule 0   • gabapentin (NEURONTIN) 300 MG capsule Take 300 mg by mouth 2 (Two) Times a Day.     • levothyroxine (SYNTHROID, LEVOTHROID) 100 MCG tablet Take 100 mcg by mouth Daily.     • lisinopril (PRINIVIL,ZESTRIL) 5 MG tablet Take 1 tablet by mouth Daily.     • pantoprazole (PROTONIX) 40 MG EC tablet Take 1 tablet by mouth Daily.     • Prucalopride Succinate (Motegrity) 2 MG tablet Take 1 tablet by mouth Daily.     • simethicone (Gas-X Extra Strength) 125 MG capsule capsule Take 1 capsule by mouth 3 (Three) Times a Day With Meals.     • traMADol (ULTRAM) 50 MG tablet TAKE ONE TABLET BY MOUTH TWICE DAILY AS NEEDED 60 tablet 2     No facility-administered medications prior to visit.       Opioid medication/s are on active medication list.  and I have evaluated her active treatment plan and pain score trends (see table).  There were no vitals filed for this visit.  I have reviewed the chart for potential of high risk medication and harmful drug interactions in the elderly.            Aspirin is on active medication list. Aspirin use is indicated based on  review of current medical condition/s. Pros and cons of this therapy have been discussed today. Benefits of this medication outweigh potential harm.  Patient has been encouraged to continue taking this medication.  .      Patient Active Problem List   Diagnosis   • Coronary artery disease involving native coronary artery of native heart without angina pectoris   • Gastroesophageal reflux disease without esophagitis   • Glaucoma   • Essential hypertension   • Mixed hyperlipidemia   • Hiatal hernia   • Irritable bowel syndrome   • Acquired hypothyroidism   • Generalized osteoarthritis   • High risk medication use   • Esophageal spasm   • Anxiety   • Age-related osteoporosis without current pathological fracture   • Pain in both lower extremities   • Annual physical exam     Advance Care Planning  Advance Directive is not on file.  ACP discussion was held with the patient during this visit. Patient does not have an advance directive, information provided.    Review of Systems   Constitutional: Negative for chills, fatigue and fever.   HENT: Negative for congestion, hearing loss and rhinorrhea.    Eyes: Negative for pain and visual disturbance.   Respiratory: Negative for cough and shortness of breath.    Cardiovascular: Positive for chest pain (going to see Wayne Daniels later today). Negative for palpitations.   Gastrointestinal: Negative for abdominal pain, constipation, diarrhea, nausea and vomiting.   Genitourinary: Negative for difficulty urinating and dysuria.   Musculoskeletal: Positive for arthralgias and back pain. Negative for myalgias.        +leg pain   Neurological: Negative for weakness and numbness.   Psychiatric/Behavioral: Negative for dysphoric mood and sleep disturbance. The patient is not nervous/anxious.         Objective    Vitals:    11/03/21 0920   BP: 121/74   BP Location: Left arm   Patient Position: Sitting   Pulse: 68   Temp: 98.8 °F (37.1 °C)   TempSrc: Oral   Weight: 62.4 kg (137 lb 9.6  "oz)   Height: 162.6 cm (64\")     BMI Readings from Last 1 Encounters:   21 23.62 kg/m²   BMI is within normal parameters. No follow-up required.    Does the patient have evidence of cognitive impairment? No    Physical Exam  Vitals reviewed.   Constitutional:       General: She is not in acute distress.     Appearance: Normal appearance. She is well-developed.   HENT:      Head: Normocephalic and atraumatic.      Right Ear: External ear normal.      Left Ear: External ear normal.      Mouth/Throat:      Mouth: Mucous membranes are moist.   Eyes:      Extraocular Movements: Extraocular movements intact.      Conjunctiva/sclera: Conjunctivae normal.      Pupils: Pupils are equal, round, and reactive to light.   Cardiovascular:      Rate and Rhythm: Normal rate and regular rhythm.      Heart sounds: No murmur heard.      Pulmonary:      Effort: Pulmonary effort is normal.      Breath sounds: Normal breath sounds. No wheezing, rhonchi or rales.   Abdominal:      General: Bowel sounds are normal. There is no distension.      Palpations: Abdomen is soft.      Tenderness: There is no abdominal tenderness.   Musculoskeletal:         General: Normal range of motion.   Skin:     General: Skin is warm and dry.   Neurological:      Mental Status: She is alert and oriented to person, place, and time.      Deep Tendon Reflexes: Reflexes normal.   Psychiatric:         Mood and Affect: Mood and affect normal.         Behavior: Behavior normal.         Thought Content: Thought content normal.         Judgment: Judgment normal.                 HEALTH RISK ASSESSMENT    Smoking Status:  Social History     Tobacco Use   Smoking Status Former Smoker   • Quit date: 2004   • Years since quittin.0   Smokeless Tobacco Never Used     Alcohol Consumption:  Social History     Substance and Sexual Activity   Alcohol Use None     Fall Risk Screen:    RUSTADI Fall Risk Assessment has not been completed.    Depression " Screening:  PHQ-2/PHQ-9 Depression Screening 11/3/2021   Little interest or pleasure in doing things 0   Feeling down, depressed, or hopeless 0   Total Score 0       Health Habits and Functional and Cognitive Screening:  Functional & Cognitive Status 11/3/2021   Do you have difficulty preparing food and eating? No   Do you have difficulty bathing yourself, getting dressed or grooming yourself? No   Do you have difficulty using the toilet? No   Do you have difficulty moving around from place to place? No   Do you have trouble with steps or getting out of a bed or a chair? No   Current Diet Well Balanced Diet   Dental Exam Not up to date   Eye Exam Up to date   Exercise (times per week) 7 times per week   Current Exercises Include Yard Work;Walking;House Cleaning   Do you need help using the phone?  No   Are you deaf or do you have serious difficulty hearing?  No   Do you need help with transportation? No   Do you need help shopping? No   Do you need help preparing meals?  No   Do you need help with housework?  No   Do you need help with laundry? No   Do you need help taking your medications? No   Do you need help managing money? No   Do you ever drive or ride in a car without wearing a seat belt? No   Have you felt unusual stress, anger or loneliness in the last month? No   Who do you live with? Spouse   If you need help, do you have trouble finding someone available to you? No   Have you been bothered in the last four weeks by sexual problems? No   Do you have difficulty concentrating, remembering or making decisions? No       Age-appropriate Screening Schedule:  Refer to the list below for future screening recommendations based on patient's age, sex and/or medical conditions. Orders for these recommended tests are listed in the plan section. The patient has been provided with a written plan.    Health Maintenance   Topic Date Due   • ZOSTER VACCINE (1 of 2) Never done   • INFLUENZA VACCINE  08/01/2021   • LIPID  PANEL  04/15/2022   • DXA SCAN  02/02/2023   • MAMMOGRAM  09/23/2023   • TDAP/TD VACCINES (3 - Td or Tdap) 05/26/2026              Assessment/Plan   CMS Preventative Services Quick Reference  Risk Factors Identified During Encounter  Chronic Pain   Immunizations Discussed/Encouraged (specific Immunizations; Influenza and Shingrix  The above risks/problems have been discussed with the patient.  Follow up actions/plans if indicated are seen below in the Assessment/Plan Section.  Pertinent information has been shared with the patient in the After Visit Summary.    Diagnoses and all orders for this visit:    1. Annual physical exam (Primary)  Assessment & Plan:  She is UTD on colonoscopy, last done 9/2020 and this showed diverticulosis as well as a rectal mass.  She had that removed by a colorectal surgeon, dxed as HSIL lesion.  She is following with Colorectal Surg.  She goes back to see them in December.  Pap smear is no longer indicated by history; s/p hysterectomy.  She is UTD on mammogram, last done 9/2021 and this was normal.  She is UTD on DEXA, last done 2/2021 and this showed osteopenia.  She is UTD on COVID (Moderna), Tdap (5/2016).  She is due for Shingrix and flu.  Flu shot given today.  Shingrix can be done at the pharmacy.  She is due for routine labs including HTN panel and TSH; ordered.      2. Pain in both lower extremities  Assessment & Plan:  Sx sound consistent with vascular claudication in this patient who has known CAD.  Sending for ABIs to evaluate for PVD.    Orders:  -     Doppler Ankle Brachial Index Single Level CAR; Future    3. Vascular claudication (HCC)  -     Doppler Ankle Brachial Index Single Level CAR; Future    4. Generalized osteoarthritis  Assessment & Plan:  Stable on current regimen.  Symptoms are well controlled.  Following with Pain Management.  No adverse effects. She does require ongoing use of this controlled substance to function.  Tox screen waswas due today.  Prior tox  screen appropriate.  Ugo was run today.  Refills were not needed today.  RTC 3 months.        5. High risk medication use  -     POC Urine Drug Screen Premier Bio-Cup    6. Essential hypertension  -     Lipid Panel; Future  -     Comprehensive Metabolic Panel; Future    7. Acquired hypothyroidism  -     TSH; Future    8. Encounter for immunization  -     FluLaval/Fluarix/Fluzone >6 Months      Follow Up:   Return in about 3 months (around 2/3/2022) for Recheck.     An After Visit Summary and PPPS were made available to the patient.

## 2021-11-03 NOTE — ASSESSMENT & PLAN NOTE
Sx sound consistent with vascular claudication in this patient who has known CAD.  Sending for ABIs to evaluate for PVD.

## 2021-11-03 NOTE — ASSESSMENT & PLAN NOTE
Stable on current regimen.  Symptoms are well controlled.  Following with Pain Management.  No adverse effects. She does require ongoing use of this controlled substance to function.  Tox screen waswas due today.  Prior tox screen appropriate.  Ugo was run today.  Refills were not needed today.  RTC 3 months.

## 2021-11-04 RX ORDER — LEVOTHYROXINE SODIUM 112 UG/1
112 TABLET ORAL DAILY
Qty: 30 TABLET | Refills: 1 | Status: SHIPPED | OUTPATIENT
Start: 2021-11-04 | End: 2021-12-30 | Stop reason: SDUPTHER

## 2021-11-08 ENCOUNTER — TELEPHONE (OUTPATIENT)
Dept: FAMILY MEDICINE CLINIC | Age: 61
End: 2021-11-08

## 2021-11-08 NOTE — TELEPHONE ENCOUNTER
Caller: Addie Yates    Relationship: Self    Best call back number: [phone]    What is the best time to reach you: ANYTIME    Who are you requesting to speak with (clinical staff, provider,  specific staff member): NURSE       What was the call regarding: PATIENT IS WANTING TO SEE IF A REFERRAL HAS BEEN DONE FOR HER TO GO SEE SOMEONE ABOUT VEINS IN HER LEGS.      Do you require a callback: YES CALL TO LET PATIENT KNOW WHERE SHE NEEDS TO GO HAVE THIS DONE.

## 2021-11-12 DIAGNOSIS — M15.9 GENERALIZED OSTEOARTHRITIS: ICD-10-CM

## 2021-11-12 RX ORDER — TRAMADOL HYDROCHLORIDE 50 MG/1
TABLET ORAL
Qty: 60 TABLET | Refills: 2 | Status: SHIPPED | OUTPATIENT
Start: 2021-11-12 | End: 2022-02-15

## 2021-11-16 ENCOUNTER — APPOINTMENT (OUTPATIENT)
Dept: CARDIOLOGY | Facility: HOSPITAL | Age: 61
End: 2021-11-16

## 2021-11-16 ENCOUNTER — TELEPHONE (OUTPATIENT)
Dept: FAMILY MEDICINE CLINIC | Age: 61
End: 2021-11-16

## 2021-11-16 NOTE — TELEPHONE ENCOUNTER
Caller: Addie Yates    Relationship: Self    Best call back number: [phone]    Who are you requesting to speak with (clinical staff, provider,  specific staff member): MEDICAL STAFF    What was the call regarding: PATIENT MISSED A CALL FROM THE OFFICE. THERE WAS NO VOICEMAIL SO SHE WAS NOT SURE WHO CALLED. ATTEMPTED TO WARM TRANSFER. PLEASE CALL PATIENT BACK.

## 2021-11-16 NOTE — TELEPHONE ENCOUNTER
I did not try to call pt and do not see any documentation where anyone from this office has tried to call.

## 2021-11-17 ENCOUNTER — HOSPITAL ENCOUNTER (OUTPATIENT)
Dept: CARDIOLOGY | Facility: HOSPITAL | Age: 61
Discharge: HOME | End: 2021-11-17
Admitting: FAMILY MEDICINE

## 2021-11-17 DIAGNOSIS — M79.604 PAIN IN BOTH LOWER EXTREMITIES: ICD-10-CM

## 2021-11-17 DIAGNOSIS — I73.9 VASCULAR CLAUDICATION (HCC): ICD-10-CM

## 2021-11-17 DIAGNOSIS — M79.605 PAIN IN BOTH LOWER EXTREMITIES: ICD-10-CM

## 2021-11-17 LAB
BH CV LOWER ARTERIAL LEFT ABI RATIO: 95
BH CV LOWER ARTERIAL LEFT DORSALIS PEDIS SYS MAX: 130 MMHG
BH CV LOWER ARTERIAL LEFT POST TIBIAL SYS MAX: 139 MMHG
BH CV LOWER ARTERIAL LEFT TBI RATIO: 0.68
BH CV LOWER ARTERIAL RIGHT ABI RATIO: 1.07
BH CV LOWER ARTERIAL RIGHT DORSALIS PEDIS SYS MAX: 150 MMHG
BH CV LOWER ARTERIAL RIGHT POST TIBIAL SYS MAX: 139 MMHG
BH CV LOWER ARTERIAL RIGHT TBI RATIO: 0.79
MAXIMAL PREDICTED HEART RATE: 158 BPM
STRESS TARGET HR: 134 BPM
UPPER ARTERIAL LEFT ARM BRACHIAL SYS MAX: 124 MMHG
UPPER ARTERIAL RIGHT ARM BRACHIAL SYS MAX: 140 MMHG

## 2021-11-17 PROCEDURE — 93922 UPR/L XTREMITY ART 2 LEVELS: CPT | Performed by: SURGERY

## 2021-11-17 PROCEDURE — 93922 UPR/L XTREMITY ART 2 LEVELS: CPT

## 2021-11-24 ENCOUNTER — OFFICE VISIT (OUTPATIENT)
Dept: GASTROENTEROLOGY | Facility: CLINIC | Age: 61
End: 2021-11-24

## 2021-11-24 VITALS
SYSTOLIC BLOOD PRESSURE: 139 MMHG | HEIGHT: 64 IN | DIASTOLIC BLOOD PRESSURE: 86 MMHG | HEART RATE: 64 BPM | WEIGHT: 139.4 LBS | BODY MASS INDEX: 23.8 KG/M2

## 2021-11-24 DIAGNOSIS — K44.9 HIATAL HERNIA: ICD-10-CM

## 2021-11-24 DIAGNOSIS — K21.9 GASTROESOPHAGEAL REFLUX DISEASE WITHOUT ESOPHAGITIS: Primary | ICD-10-CM

## 2021-11-24 DIAGNOSIS — K59.04 CHRONIC IDIOPATHIC CONSTIPATION: ICD-10-CM

## 2021-11-24 DIAGNOSIS — K31.84 GASTROPARESIS: ICD-10-CM

## 2021-11-24 PROCEDURE — 99214 OFFICE O/P EST MOD 30 MIN: CPT | Performed by: NURSE PRACTITIONER

## 2021-11-24 RX ORDER — PRUCALOPRIDE 2 MG/1
1 TABLET, FILM COATED ORAL DAILY
Qty: 90 TABLET | Refills: 1 | Status: SHIPPED | OUTPATIENT
Start: 2021-11-24 | End: 2022-06-10 | Stop reason: SDUPTHER

## 2021-11-24 RX ORDER — POLYETHYLENE GLYCOL 3350 17 G/17G
17 POWDER, FOR SOLUTION ORAL DAILY
Qty: 527 G | Refills: 1 | Status: SHIPPED | OUTPATIENT
Start: 2021-11-24 | End: 2021-12-24

## 2021-11-24 RX ORDER — OMEPRAZOLE 20 MG/1
20 CAPSULE, DELAYED RELEASE ORAL
COMMUNITY
Start: 2021-11-03 | End: 2022-06-28 | Stop reason: SDUPTHER

## 2021-11-24 RX ORDER — SUCRALFATE 1 G/1
1 TABLET ORAL 4 TIMES DAILY
Qty: 56 TABLET | Refills: 0 | Status: SHIPPED | OUTPATIENT
Start: 2021-11-24 | End: 2021-12-08

## 2021-11-24 NOTE — PROGRESS NOTES
Chief Complaint  Constipation     Addie Yates is a 61 y.o. female who presents to [facility] for follow-up of constipation, gastroparesis and hiatal hernia.    History of present Illness  She reports that she's experiencing bloating following meals and belching following meals.  Reports a heaviness in chest.  States that she was evaluated by cardiology and had an EKG.   They changed protonix to omeprazole BID as he felt that it was related to reflux.  She is to notify him in December if not improved.      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  Has repeat procedure 12/17.      She has a bowel movement about every 1-2 weeks with motegrity daily.      History of Present Illness    Past Medical History:   Diagnosis Date   • Anxiety disorder, unspecified    • Atherosclerotic heart disease of native coronary artery without angina pectoris    • Carpal tunnel syndrome on right    • Chronic pain     BASE OF SKULL/UPPERMOST NECK   • Constipation, unspecified    • DDD (degenerative disc disease), cervical    • DDD (degenerative disc disease), lumbar    • Diaphragmatic hernia without obstruction and without gangrene    • Dyskinesia of esophagus    • Gastroparesis    • GERD (gastroesophageal reflux disease)    • Glaucoma    • Hiatal hernia    • Hypothyroidism, unspecified    • Long term (current) use of opiate analgesic    • Medial epicondylitis    • Osteoarthritis    • Osteoporosis without current pathological fracture    • Other idiopathic scoliosis, site unspecified    • Presence of intraocular lens    • Primary generalized (osteo)arthritis    • Right lateral epicondylitis    • Toxic goiter    • Unspecified hemorrhoids        Past Surgical History:   Procedure Laterality Date   • COLONOSCOPY      2007, 2010, 2015 - WITH ADENOMA   • CORONARY ANGIOPLASTY WITH STENT PLACEMENT  11/2015   • ENDOSCOPY      2008, 2016   • HYSTERECTOMY  2004   • LAPAROSCOPIC  CHOLECYSTECTOMY  2017   • REFRACTIVE SURGERY Bilateral    • THYROID BIOPSY Right    • TUBAL ABDOMINAL LIGATION      BI-TUBAL         Current Outpatient Medications:   •  aspirin (aspirin) 81 MG EC tablet, Take 1 tablet by mouth Daily., Disp: , Rfl:   •  atorvastatin (LIPITOR) 80 MG tablet, Take 1 tablet by mouth Daily., Disp: , Rfl:   •  busPIRone (BUSPAR) 5 MG tablet, Take 1 tablet by mouth Daily., Disp: 30 tablet, Rfl: 5  •  Calcium 500-125 MG-UNIT tablet, Take 1 tablet by mouth Daily., Disp: , Rfl:   •  denosumab (Prolia) 60 MG/ML solution prefilled syringe syringe, Inject 1 mL under the skin into the appropriate area as directed Every 6 (Six) Months., Disp: , Rfl:   •  diclofenac (VOLTAREN) 75 MG EC tablet, Take 75 mg by mouth 2 (Two) Times a Day As Needed., Disp: , Rfl:   •  docusate sodium (COLACE) 100 MG capsule, Take 100 mg by mouth 2 (Two) Times a Day., Disp: , Rfl:   •  DULoxetine (CYMBALTA) 60 MG capsule, TAKE ONE CAPSULE BY MOUTH EVERY DAY, Disp: 30 capsule, Rfl: 0  •  gabapentin (NEURONTIN) 300 MG capsule, Take 300 mg by mouth 2 (Two) Times a Day., Disp: , Rfl:   •  levothyroxine (SYNTHROID, LEVOTHROID) 112 MCG tablet, Take 1 tablet by mouth Daily., Disp: 30 tablet, Rfl: 1  •  lisinopril (PRINIVIL,ZESTRIL) 5 MG tablet, Take 1 tablet by mouth Daily., Disp: , Rfl:   •  omeprazole (priLOSEC) 20 MG capsule, Take 20 mg by mouth., Disp: , Rfl:   •  Prucalopride Succinate (Motegrity) 2 MG tablet, Take 1 tablet by mouth Daily., Disp: 90 tablet, Rfl: 1  •  simethicone (Gas-X Extra Strength) 125 MG capsule capsule, Take 1 capsule by mouth 3 (Three) Times a Day With Meals., Disp: , Rfl:   •  traMADol (ULTRAM) 50 MG tablet, TAKE ONE TABLET BY MOUTH TWICE DAILY AS NEEDED, Disp: 60 tablet, Rfl: 2  •  polyethylene glycol (MIRALAX) 17 GM/SCOOP powder, Take 17 g by mouth Daily for 30 days., Disp: 527 g, Rfl: 1  •  sucralfate (Carafate) 1 g tablet, Take 1 tablet by mouth 4 (Four) Times a Day for 14 days., Disp: 56  "tablet, Rfl: 0     No Known Allergies    Family History   Problem Relation Age of Onset   • Leukemia Other    • Kidney cancer Other    • Diabetes Other         Social History     Social History Narrative   • Not on file       Objective     Review of Systems   Constitutional: Negative for fever and unexpected weight loss.   Respiratory: Negative for cough and shortness of breath.    Cardiovascular: Negative for chest pain and palpitations.   Gastrointestinal:        See HPI   Neurological: Negative for weakness and confusion.        Vital Signs:   /86 (BP Location: Left arm, Patient Position: Sitting, Cuff Size: Adult)   Pulse 64   Ht 162.6 cm (64\")   Wt 63.2 kg (139 lb 6.4 oz)   BMI 23.93 kg/m²       Physical Exam  Constitutional:       General: She is not in acute distress.     Appearance: Normal appearance. She is well-developed and normal weight.   HENT:      Head: Normocephalic and atraumatic.   Eyes:      Conjunctiva/sclera: Conjunctivae normal.      Pupils: Pupils are equal, round, and reactive to light.      Visual Fields: Right eye visual fields normal and left eye visual fields normal.   Cardiovascular:      Rate and Rhythm: Normal rate and regular rhythm.      Heart sounds: Normal heart sounds.   Pulmonary:      Effort: Pulmonary effort is normal. No retractions.      Breath sounds: Normal breath sounds and air entry.   Abdominal:      General: Bowel sounds are normal.      Palpations: Abdomen is soft.      Tenderness: There is no abdominal tenderness.      Comments: No appreciable hepatosplenomegaly or ascites   Musculoskeletal:         General: Normal range of motion.      Cervical back: Neck supple.      Right lower leg: No edema.      Left lower leg: No edema.   Lymphadenopathy:      Cervical: No cervical adenopathy.   Skin:     General: Skin is warm and dry.      Findings: No lesion.   Neurological:      General: No focal deficit present.      Mental Status: She is alert and oriented to " person, place, and time.   Psychiatric:         Mood and Affect: Mood and affect normal.         Behavior: Behavior normal.         Result Review :   The following data was reviewed by: KEVON Mahan on 11/24/2021:  CBC w/diff    CBC w/Diff 4/15/21   WBC 6.44   RBC 4.16 (A)   Hemoglobin 13.10   Hematocrit 40.4   MCV 97.1   MCH 31.5 (A)   MCHC 32.4 (A)   RDW 12.9   Platelets 209.00   (A) Abnormal value            CMP    CMP 4/15/21 11/3/21   Glucose 82 101 (A)   BUN 19 24 (A)   Creatinine 1.03 (A) 0.84   eGFR Non African Am  69   Sodium 139 138   Potassium 4.4 4.3   Chloride 102 103   Calcium 8.7 8.9   Albumin 4.3 4.40   Total Bilirubin 0.40 0.3   Alkaline Phosphatase 59 83   AST (SGOT) 33 41 (A)   ALT (SGPT) 30 45 (A)   (A) Abnormal value            11/3/2021 TSH 12.9.              Assessment and Plan    Diagnoses and all orders for this visit:    1. Gastroesophageal reflux disease without esophagitis (Primary)  -     sucralfate (Carafate) 1 g tablet; Take 1 tablet by mouth 4 (Four) Times a Day for 14 days.  Dispense: 56 tablet; Refill: 0    2. Hiatal hernia    3. Gastroparesis    4. Chronic idiopathic constipation  -     polyethylene glycol (MIRALAX) 17 GM/SCOOP powder; Take 17 g by mouth Daily for 30 days.  Dispense: 527 g; Refill: 1  -     Prucalopride Succinate (Motegrity) 2 MG tablet; Take 1 tablet by mouth Daily.  Dispense: 90 tablet; Refill: 1      Recommend adding miralax (new RX sent) to motegrity given that she's continuing to experience constipation.  If no improvement with carafate, she will call.  Encouraged gastroparesis diet.          Follow Up   Return in about 6 months (around 5/24/2022).  Patient was given instructions and counseling regarding her condition or for health maintenance advice. Please see specific information pulled into the AVS if appropriate.

## 2021-12-06 RX ORDER — DULOXETIN HYDROCHLORIDE 60 MG/1
CAPSULE, DELAYED RELEASE ORAL
Qty: 90 CAPSULE | Refills: 0 | Status: SHIPPED | OUTPATIENT
Start: 2021-12-06 | End: 2022-02-09 | Stop reason: SDUPTHER

## 2021-12-14 ENCOUNTER — CLINICAL SUPPORT (OUTPATIENT)
Dept: FAMILY MEDICINE CLINIC | Age: 61
End: 2021-12-14

## 2021-12-14 ENCOUNTER — TRANSCRIBE ORDERS (OUTPATIENT)
Dept: FAMILY MEDICINE CLINIC | Age: 61
End: 2021-12-14

## 2021-12-14 DIAGNOSIS — Z01.818 PRE-OPERATIVE CLEARANCE: ICD-10-CM

## 2021-12-14 DIAGNOSIS — Z01.818 PRE-OPERATIVE CLEARANCE: Primary | ICD-10-CM

## 2021-12-14 LAB — SARS-COV-2 RNA PNL SPEC NAA+PROBE: NOT DETECTED

## 2021-12-14 PROCEDURE — U0004 COV-19 TEST NON-CDC HGH THRU: HCPCS | Performed by: STUDENT IN AN ORGANIZED HEALTH CARE EDUCATION/TRAINING PROGRAM

## 2021-12-14 PROCEDURE — U0005 INFEC AGEN DETEC AMPLI PROBE: HCPCS | Performed by: STUDENT IN AN ORGANIZED HEALTH CARE EDUCATION/TRAINING PROGRAM

## 2021-12-14 PROCEDURE — 99211 OFF/OP EST MAY X REQ PHY/QHP: CPT | Performed by: FAMILY MEDICINE

## 2021-12-16 ENCOUNTER — TELEPHONE (OUTPATIENT)
Dept: FAMILY MEDICINE CLINIC | Age: 61
End: 2021-12-16

## 2021-12-16 DIAGNOSIS — E03.9 HYPOTHYROIDISM (ACQUIRED): Primary | ICD-10-CM

## 2021-12-16 DIAGNOSIS — R74.01 TRANSAMINITIS: ICD-10-CM

## 2021-12-16 NOTE — TELEPHONE ENCOUNTER
----- Message from Jammie Phillips MD sent at 11/4/2021  8:22 AM EDT -----  Please call pt to come back in for TSH recheck.  Please place order and pend to me, dx hypothyroidism.  Please also order CMP, diagnosis transaminitis for the same recheck.  Thanks, MASON

## 2021-12-29 ENCOUNTER — LAB (OUTPATIENT)
Dept: LAB | Facility: HOSPITAL | Age: 61
End: 2021-12-29

## 2021-12-29 DIAGNOSIS — R74.01 TRANSAMINITIS: ICD-10-CM

## 2021-12-29 DIAGNOSIS — E03.9 HYPOTHYROIDISM (ACQUIRED): ICD-10-CM

## 2021-12-29 LAB
ALBUMIN SERPL-MCNC: 4.1 G/DL (ref 3.5–5.2)
ALBUMIN/GLOB SERPL: 2.1 G/DL
ALP SERPL-CCNC: 63 U/L (ref 39–117)
ALT SERPL W P-5'-P-CCNC: 26 U/L (ref 1–33)
ANION GAP SERPL CALCULATED.3IONS-SCNC: 8.1 MMOL/L (ref 5–15)
AST SERPL-CCNC: 21 U/L (ref 1–32)
BILIRUB SERPL-MCNC: 0.4 MG/DL (ref 0–1.2)
BUN SERPL-MCNC: 19 MG/DL (ref 8–23)
BUN/CREAT SERPL: 25.3 (ref 7–25)
CALCIUM SPEC-SCNC: 8.5 MG/DL (ref 8.6–10.5)
CHLORIDE SERPL-SCNC: 109 MMOL/L (ref 98–107)
CO2 SERPL-SCNC: 27.9 MMOL/L (ref 22–29)
CREAT SERPL-MCNC: 0.75 MG/DL (ref 0.57–1)
GFR SERPL CREATININE-BSD FRML MDRD: 78 ML/MIN/1.73
GLOBULIN UR ELPH-MCNC: 2 GM/DL
GLUCOSE SERPL-MCNC: 93 MG/DL (ref 65–99)
POTASSIUM SERPL-SCNC: 4.1 MMOL/L (ref 3.5–5.2)
PROT SERPL-MCNC: 6.1 G/DL (ref 6–8.5)
SODIUM SERPL-SCNC: 145 MMOL/L (ref 136–145)
TSH SERPL DL<=0.05 MIU/L-ACNC: 7.21 UIU/ML (ref 0.27–4.2)

## 2021-12-29 PROCEDURE — 36415 COLL VENOUS BLD VENIPUNCTURE: CPT

## 2021-12-29 PROCEDURE — 84443 ASSAY THYROID STIM HORMONE: CPT

## 2021-12-29 PROCEDURE — 80053 COMPREHEN METABOLIC PANEL: CPT

## 2021-12-30 RX ORDER — LEVOTHYROXINE SODIUM 0.12 MG/1
125 TABLET ORAL DAILY
Qty: 30 TABLET | Refills: 1 | Status: SHIPPED | OUTPATIENT
Start: 2021-12-30 | End: 2022-02-11 | Stop reason: SDUPTHER

## 2022-01-05 DIAGNOSIS — F41.9 ANXIETY: ICD-10-CM

## 2022-01-05 RX ORDER — LEVOTHYROXINE SODIUM 112 UG/1
TABLET ORAL
Qty: 30 TABLET | Refills: 1 | OUTPATIENT
Start: 2022-01-05

## 2022-01-05 RX ORDER — BUSPIRONE HYDROCHLORIDE 5 MG/1
TABLET ORAL
Qty: 30 TABLET | Refills: 5 | Status: SHIPPED | OUTPATIENT
Start: 2022-01-05 | End: 2022-05-06 | Stop reason: SDUPTHER

## 2022-02-09 ENCOUNTER — OFFICE VISIT (OUTPATIENT)
Dept: FAMILY MEDICINE CLINIC | Age: 62
End: 2022-02-09

## 2022-02-09 ENCOUNTER — LAB (OUTPATIENT)
Dept: LAB | Facility: HOSPITAL | Age: 62
End: 2022-02-09

## 2022-02-09 VITALS
BODY MASS INDEX: 23.9 KG/M2 | WEIGHT: 140 LBS | SYSTOLIC BLOOD PRESSURE: 125 MMHG | HEART RATE: 70 BPM | TEMPERATURE: 98.1 F | DIASTOLIC BLOOD PRESSURE: 77 MMHG | HEIGHT: 64 IN

## 2022-02-09 DIAGNOSIS — E03.9 ACQUIRED HYPOTHYROIDISM: ICD-10-CM

## 2022-02-09 DIAGNOSIS — F41.9 ANXIETY: ICD-10-CM

## 2022-02-09 DIAGNOSIS — Z79.899 HIGH RISK MEDICATION USE: ICD-10-CM

## 2022-02-09 DIAGNOSIS — E78.2 MIXED HYPERLIPIDEMIA: ICD-10-CM

## 2022-02-09 DIAGNOSIS — M15.9 GENERALIZED OSTEOARTHRITIS: Primary | ICD-10-CM

## 2022-02-09 DIAGNOSIS — I10 ESSENTIAL HYPERTENSION: ICD-10-CM

## 2022-02-09 PROBLEM — Z00.00 ANNUAL PHYSICAL EXAM: Status: RESOLVED | Noted: 2021-11-03 | Resolved: 2022-02-09

## 2022-02-09 LAB
AMPHET+METHAMPHET UR QL: NEGATIVE
AMPHETAMINES UR QL: NEGATIVE
BARBITURATES UR QL SCN: NEGATIVE
BENZODIAZ UR QL SCN: NEGATIVE
BUPRENORPHINE SERPL-MCNC: NEGATIVE NG/ML
CANNABINOIDS SERPL QL: NEGATIVE
COCAINE UR QL: NEGATIVE
EXPIRATION DATE: NORMAL
Lab: NORMAL
MDMA UR QL SCN: NEGATIVE
METHADONE UR QL SCN: NEGATIVE
OPIATES UR QL: NEGATIVE
OXYCODONE UR QL SCN: NEGATIVE
PCP UR QL SCN: NEGATIVE
TSH SERPL DL<=0.05 MIU/L-ACNC: 2.41 UIU/ML (ref 0.27–4.2)

## 2022-02-09 PROCEDURE — 80305 DRUG TEST PRSMV DIR OPT OBS: CPT | Performed by: FAMILY MEDICINE

## 2022-02-09 PROCEDURE — 36415 COLL VENOUS BLD VENIPUNCTURE: CPT

## 2022-02-09 PROCEDURE — 99214 OFFICE O/P EST MOD 30 MIN: CPT | Performed by: FAMILY MEDICINE

## 2022-02-09 PROCEDURE — 84443 ASSAY THYROID STIM HORMONE: CPT

## 2022-02-09 RX ORDER — DULOXETIN HYDROCHLORIDE 60 MG/1
60 CAPSULE, DELAYED RELEASE ORAL DAILY
Qty: 90 CAPSULE | Refills: 1 | Status: SHIPPED | OUTPATIENT
Start: 2022-02-09 | End: 2022-08-05

## 2022-02-09 NOTE — ASSESSMENT & PLAN NOTE
Doing fairly well in general.  Checking TSH to see if she has back in range.  Consider increasing buspirone if thyroid is back where it should be.

## 2022-02-09 NOTE — ASSESSMENT & PLAN NOTE
Last TSH was still out of range.  She is due for the recheck at this time.  Recheck on TSH ordered today.

## 2022-02-09 NOTE — PROGRESS NOTES
"Chief Complaint  Osteoarthritis (3 month f/u)    Subjective          Addie Yates presents to [facility] today for routine follow-up on chronic issues.    She is on tramadol, diclofenac and gabapentin for chronic neck and back pain due to diffuse arthritis and scoliosis.  She takes the gabapentin 300 mg twice a day.  She uses the tramadol typically once daily.  No adverse effects.  She is following with Dr. Simon at [facility].  Dr. Simon is managing the gabapentin.  She has been getting injections but without much relief.       She is on Cymbalta 60 mg for anxiety and pain control.  She is also on buspirone 5 mg daily.  That has helped get the anxiety under better control.      She is on lisinopril, atorvastatin, and ASA/Plavix for CAD s/p stenting in 11/2015.  She follows with Wayne Daniels.  No chest pain, palpitations, shortness of breath.      She is on levothyroxine for hypothyroidism.  No cold/heat intolerance or changes in hair or skin.  TSH has been out of range the past two checks with adjustments made to the medication.  She is due for the next repeat.      She is on omeprazole for GERD and esophageal spasm.    She follows with Dr. Leroy.  Her last EGD showed hiatal hernia and gastritis.  Gastric emptying study has shown gastroparesis.  She is on Colace, Miralax, and lactulose for constipation.  She has been diagnosed with high grade dysplasia in rectum.  She last saw Colorectal Surg in December who told her that things were \"looking better.\"  She is continuing to follow with Thuy LUND and Dr. Leroy.        She is on Prolia for osteoporosis.  Her DEXA is up-to-date, last done 2/2021 and this showed osteopenia.  Hips are improved.      Current Outpatient Medications:   •  aspirin (aspirin) 81 MG EC tablet, Take 1 tablet by mouth Daily., Disp: , Rfl:   •  atorvastatin (LIPITOR) 80 MG tablet, Take 1 tablet by mouth Daily., Disp: , Rfl:   •  " "busPIRone (BUSPAR) 5 MG tablet, TAKE ONE TABLET BY MOUTH EVERY DAY, Disp: 30 tablet, Rfl: 5  •  Calcium 500-125 MG-UNIT tablet, Take 1 tablet by mouth Daily., Disp: , Rfl:   •  denosumab (Prolia) 60 MG/ML solution prefilled syringe syringe, Inject 1 mL under the skin into the appropriate area as directed Every 6 (Six) Months., Disp: , Rfl:   •  diclofenac (VOLTAREN) 75 MG EC tablet, Take 75 mg by mouth 2 (Two) Times a Day As Needed., Disp: , Rfl:   •  docusate sodium (COLACE) 100 MG capsule, Take 100 mg by mouth 2 (Two) Times a Day., Disp: , Rfl:   •  DULoxetine (CYMBALTA) 60 MG capsule, Take 1 capsule by mouth Daily., Disp: 90 capsule, Rfl: 1  •  gabapentin (NEURONTIN) 300 MG capsule, Take 300 mg by mouth 2 (Two) Times a Day., Disp: , Rfl:   •  levothyroxine (SYNTHROID, LEVOTHROID) 125 MCG tablet, Take 1 tablet by mouth Daily., Disp: 30 tablet, Rfl: 1  •  lisinopril (PRINIVIL,ZESTRIL) 5 MG tablet, Take 1 tablet by mouth Daily., Disp: , Rfl:   •  omeprazole (priLOSEC) 20 MG capsule, Take 20 mg by mouth., Disp: , Rfl:   •  Prucalopride Succinate (Motegrity) 2 MG tablet, Take 1 tablet by mouth Daily., Disp: 90 tablet, Rfl: 1  •  simethicone (Gas-X Extra Strength) 125 MG capsule capsule, Take 1 capsule by mouth 3 (Three) Times a Day With Meals., Disp: , Rfl:   •  traMADol (ULTRAM) 50 MG tablet, TAKE ONE TABLET BY MOUTH TWICE DAILY AS NEEDED, Disp: 60 tablet, Rfl: 2    Allergies:  Patient has no known allergies.      Objective   Vital Signs:   /77 (BP Location: Right arm, Patient Position: Sitting)   Pulse 70   Temp 98.1 °F (36.7 °C) (Oral)   Ht 162.6 cm (64.02\")   Wt 63.5 kg (140 lb)   BMI 24.02 kg/m²     Physical Exam  Vitals reviewed.   Constitutional:       General: She is not in acute distress.     Appearance: Normal appearance. She is well-developed.   HENT:      Head: Normocephalic and atraumatic.      Right Ear: External ear normal.      Left Ear: External ear normal.   Eyes:      Extraocular " Movements: Extraocular movements intact.      Conjunctiva/sclera: Conjunctivae normal.      Pupils: Pupils are equal, round, and reactive to light.   Cardiovascular:      Rate and Rhythm: Normal rate and regular rhythm.      Heart sounds: No murmur heard.      Pulmonary:      Effort: Pulmonary effort is normal.      Breath sounds: Normal breath sounds. No wheezing, rhonchi or rales.   Abdominal:      General: Bowel sounds are normal. There is no distension.      Palpations: Abdomen is soft.      Tenderness: There is no abdominal tenderness.   Musculoskeletal:         General: Normal range of motion.   Neurological:      Mental Status: She is alert.   Psychiatric:         Mood and Affect: Affect normal.        Lab Results   Component Value Date    CHOL 146 11/03/2021    CHLPL 135 04/15/2021    TRIG 48 11/03/2021    HDL 66 (H) 11/03/2021    LDL 69 11/03/2021            Assessment and Plan    Diagnoses and all orders for this visit:    1. Generalized osteoarthritis (Primary)  Assessment & Plan:  Stable on current regimen.  Symptoms are well controlled.  No adverse effects. She does require ongoing use of this controlled substance to function.  Tox screen was due today.  Prior tox screen appropriate.  Ugo was run today.  Refills were not needed today.  RTC 3 months.        2. High risk medication use  -     POC Urine Drug Screen [facility]    3. Essential hypertension  Assessment & Plan:  Blood pressure at goal.  No refills needed.  Labs are reviewed and up-to-date.      4. Mixed hyperlipidemia  Assessment & Plan:  Stable on atorvastatin.  Last lipid panel was reviewed and is up-to-date.  No refills needed.      5. Acquired hypothyroidism  Assessment & Plan:  Last TSH was still out of range.  She is due for the recheck at this time.  Recheck on TSH ordered today.    Orders:  -     TSH; Future    6. Anxiety  Assessment & Plan:  Doing fairly well in general.  Checking TSH to see if she has back in range.  Consider  increasing buspirone if thyroid is back where it should be.    Orders:  -     DULoxetine (CYMBALTA) 60 MG capsule; Take 1 capsule by mouth Daily.  Dispense: 90 capsule; Refill: 1      Follow Up   Return in about 3 months (around 5/9/2022) for Recheck.  Patient was given instructions and counseling regarding her condition or for health maintenance advice. Please see specific information pulled into the AVS if appropriate.

## 2022-02-11 RX ORDER — LEVOTHYROXINE SODIUM 0.12 MG/1
125 TABLET ORAL DAILY
Qty: 90 TABLET | Refills: 0 | Status: SHIPPED | OUTPATIENT
Start: 2022-02-11 | End: 2022-05-06 | Stop reason: SDUPTHER

## 2022-02-14 DIAGNOSIS — M15.9 GENERALIZED OSTEOARTHRITIS: ICD-10-CM

## 2022-02-15 RX ORDER — TRAMADOL HYDROCHLORIDE 50 MG/1
TABLET ORAL
Qty: 60 TABLET | Refills: 2 | Status: SHIPPED | OUTPATIENT
Start: 2022-02-15 | End: 2022-05-24

## 2022-02-22 RX ORDER — LEVOTHYROXINE SODIUM 0.12 MG/1
TABLET ORAL
Qty: 30 TABLET | Refills: 1 | OUTPATIENT
Start: 2022-02-22

## 2022-02-25 ENCOUNTER — TELEPHONE (OUTPATIENT)
Dept: FAMILY MEDICINE CLINIC | Age: 62
End: 2022-02-25

## 2022-02-25 DIAGNOSIS — E83.51 HYPOCALCEMIA: Primary | ICD-10-CM

## 2022-03-07 ENCOUNTER — LAB (OUTPATIENT)
Dept: LAB | Facility: HOSPITAL | Age: 62
End: 2022-03-07

## 2022-03-07 DIAGNOSIS — E83.51 HYPOCALCEMIA: ICD-10-CM

## 2022-03-07 LAB
ANION GAP SERPL CALCULATED.3IONS-SCNC: 9.9 MMOL/L (ref 5–15)
BUN SERPL-MCNC: 19 MG/DL (ref 8–23)
BUN/CREAT SERPL: 24.1 (ref 7–25)
CALCIUM SPEC-SCNC: 9.1 MG/DL (ref 8.6–10.5)
CHLORIDE SERPL-SCNC: 104 MMOL/L (ref 98–107)
CO2 SERPL-SCNC: 26.1 MMOL/L (ref 22–29)
CREAT SERPL-MCNC: 0.79 MG/DL (ref 0.57–1)
EGFRCR SERPLBLD CKD-EPI 2021: 84.7 ML/MIN/1.73
GLUCOSE SERPL-MCNC: 82 MG/DL (ref 65–99)
POTASSIUM SERPL-SCNC: 4 MMOL/L (ref 3.5–5.2)
SODIUM SERPL-SCNC: 140 MMOL/L (ref 136–145)

## 2022-03-07 PROCEDURE — 80048 BASIC METABOLIC PNL TOTAL CA: CPT

## 2022-03-07 PROCEDURE — 36415 COLL VENOUS BLD VENIPUNCTURE: CPT

## 2022-03-11 NOTE — TELEPHONE ENCOUNTER
Pt due for Prolia 2/23/22     Last Dexa   DEXA Bone Density Axial (02/02/2021 10:16)    Last CMP- Calcium 9.1 (was 8.5)  Basic Metabolic Panel (03/07/2022 10:58)    Last office Visit   Office Visit with Jammie Phillips MD (02/09/2022)    Ok to proceed with rescheduling Prolia? Please advise

## 2022-03-11 NOTE — TELEPHONE ENCOUNTER
Rescheduled [facility] appt 3/18/22 @1pm (arrive 12:45pm), send infusion center new [facility] lab.    LMV to inf pt of appt

## 2022-03-18 DIAGNOSIS — M81.0 AGE-RELATED OSTEOPOROSIS WITHOUT CURRENT PATHOLOGICAL FRACTURE: Primary | ICD-10-CM

## 2022-03-18 RX ORDER — DENOSUMAB 60 MG/ML
60 INJECTION SUBCUTANEOUS
Qty: 1 ML | Refills: 0 | Status: SHIPPED | OUTPATIENT
Start: 2022-03-18 | End: 2022-03-18

## 2022-03-18 RX ORDER — DENOSUMAB 60 MG/ML
60 INJECTION SUBCUTANEOUS
Qty: 1 ML | Refills: 0 | Status: SHIPPED | OUTPATIENT
Start: 2022-03-18 | End: 2022-06-16 | Stop reason: SDUPTHER

## 2022-03-18 NOTE — TELEPHONE ENCOUNTER
Rx Refill Note  Requested Prescriptions     Signed Prescriptions Disp Refills   • denosumab (Prolia) 60 MG/ML solution prefilled syringe syringe 1 mL 0     Sig: Inject 1 mL under the skin into the appropriate area as directed Every 6 (Six) Months for 180 days.     Authorizing Provider: AMADEO CARLOS     Ordering User: ALEXA RAMIREZ      Last office visit with prescribing clinician: 2/9/2022      Next office visit with prescribing clinician: 5/6/2022     Alexa Ramirez  03/18/22, 13:17 EDT

## 2022-05-06 ENCOUNTER — OFFICE VISIT (OUTPATIENT)
Dept: FAMILY MEDICINE CLINIC | Age: 62
End: 2022-05-06

## 2022-05-06 VITALS
DIASTOLIC BLOOD PRESSURE: 82 MMHG | TEMPERATURE: 98.3 F | SYSTOLIC BLOOD PRESSURE: 117 MMHG | HEIGHT: 64 IN | HEART RATE: 73 BPM | BODY MASS INDEX: 22.88 KG/M2 | WEIGHT: 134 LBS

## 2022-05-06 DIAGNOSIS — I10 ESSENTIAL HYPERTENSION: ICD-10-CM

## 2022-05-06 DIAGNOSIS — E03.9 ACQUIRED HYPOTHYROIDISM: ICD-10-CM

## 2022-05-06 DIAGNOSIS — Z79.899 HIGH RISK MEDICATION USE: ICD-10-CM

## 2022-05-06 DIAGNOSIS — I25.10 CORONARY ARTERY DISEASE INVOLVING NATIVE CORONARY ARTERY OF NATIVE HEART WITHOUT ANGINA PECTORIS: ICD-10-CM

## 2022-05-06 DIAGNOSIS — E78.2 MIXED HYPERLIPIDEMIA: ICD-10-CM

## 2022-05-06 DIAGNOSIS — F41.9 ANXIETY: ICD-10-CM

## 2022-05-06 DIAGNOSIS — M15.9 GENERALIZED OSTEOARTHRITIS: Primary | ICD-10-CM

## 2022-05-06 PROCEDURE — 99214 OFFICE O/P EST MOD 30 MIN: CPT | Performed by: FAMILY MEDICINE

## 2022-05-06 PROCEDURE — 80305 DRUG TEST PRSMV DIR OPT OBS: CPT | Performed by: FAMILY MEDICINE

## 2022-05-06 RX ORDER — LEVOTHYROXINE SODIUM 0.12 MG/1
125 TABLET ORAL DAILY
Qty: 90 TABLET | Refills: 1 | Status: SHIPPED | OUTPATIENT
Start: 2022-05-06 | End: 2022-08-10 | Stop reason: SDUPTHER

## 2022-05-06 RX ORDER — BUSPIRONE HYDROCHLORIDE 5 MG/1
5 TABLET ORAL DAILY
Qty: 90 TABLET | Refills: 1 | Status: SHIPPED | OUTPATIENT
Start: 2022-05-06 | End: 2022-08-10 | Stop reason: SDUPTHER

## 2022-05-06 NOTE — ASSESSMENT & PLAN NOTE
Stable on current regimen.  She follows with Pain Management for the gabapentin.  She still has a fair amount of pain.  No adverse effects. She does require ongoing use of this controlled substance to function.  Tox screen was due today.  Prior tox screen appropriate.  Ugo was run today.  Refills were not needed today.  RTC 3 months.

## 2022-05-06 NOTE — ASSESSMENT & PLAN NOTE
Blood pressure goal.  Stable on lisinopril 5 mg daily.  No refills needed.  Labs are reviewed and up-to-date.

## 2022-05-06 NOTE — PROGRESS NOTES
"Chief Complaint  Osteoarthritis (3 month follow up )    Subjective          Addie Yates presents to [facility] today for routine follow-up on chronic issues.    She is on tramadol, gabapentin, duloxetine, and diclofenac for chronic neck and back pain due to diffuse arthritis and scoliosis.  She denies adverse effects.  Follows with Dr. Simon at [facility].  Dr. Simon is managing the gabapentin and I will continue to prescribe the tramadol.  She has been getting injections but without much relief.   She doesn't think the gabapentin is really helping that much.        She is on duloxetine 60 mg for anxiety and pain control.  She is also on buspirone 5 mg daily.  This is working well for her.      She is on lisinopril, atorvastatin, and ASA/Plavix for CAD s/p stenting in 11/2015.  Following with Cardiology Wayne Daniels.    She is going to do an echo on 5/16/22 due to some midline chest pressure and SOB.      She is on levothyroxine for hypothyroidism.  She denies heat/cold intolerance or changes in hair or skin.  TSH has been out of range the past two checks with adjustments made to the medication.      She is on Prilosec for GERD and esophageal spasm.  Her last EGD showed hiatal hernia and gastritis.  Gastric emptying study has shown gastroparesis.  She is on Colace, Miralax, and lactulose for constipation.  She has been diagnosed with high grade dysplasia in rectum.  She last saw Colorectal Surg in December who told her that things were \"looking better.\" She has been discharged to PRN f/u.  She is continuing to follow with Thuy LUND and Dr. Leroy and has an appt there in June.        She is on Prolia for osteoporosis.  Her DEXA is up-to-date, last done 2/2021 and this showed osteopenia.  Hips are improved.      Current Outpatient Medications:   •  aspirin 81 MG EC tablet, Take 1 tablet by mouth Daily., Disp: , Rfl:   •  atorvastatin (LIPITOR) 80 MG " "tablet, Take 1 tablet by mouth Daily., Disp: , Rfl:   •  busPIRone (BUSPAR) 5 MG tablet, Take 1 tablet by mouth Daily., Disp: 90 tablet, Rfl: 1  •  Calcium 500-125 MG-UNIT tablet, Take 1 tablet by mouth Daily., Disp: , Rfl:   •  denosumab (Prolia) 60 MG/ML solution prefilled syringe syringe, Inject 1 mL under the skin into the appropriate area as directed Every 6 (Six) Months for 180 days., Disp: 1 mL, Rfl: 0  •  diclofenac (VOLTAREN) 75 MG EC tablet, Take 75 mg by mouth 2 (Two) Times a Day As Needed., Disp: , Rfl:   •  docusate sodium (COLACE) 100 MG capsule, Take 100 mg by mouth 2 (Two) Times a Day., Disp: , Rfl:   •  DULoxetine (CYMBALTA) 60 MG capsule, Take 1 capsule by mouth Daily., Disp: 90 capsule, Rfl: 1  •  gabapentin (NEURONTIN) 300 MG capsule, Take 300 mg by mouth 2 (Two) Times a Day., Disp: , Rfl:   •  levothyroxine (SYNTHROID, LEVOTHROID) 125 MCG tablet, Take 1 tablet by mouth Daily., Disp: 90 tablet, Rfl: 1  •  lisinopril (PRINIVIL,ZESTRIL) 5 MG tablet, Take 1 tablet by mouth Daily., Disp: , Rfl:   •  omeprazole (priLOSEC) 20 MG capsule, Take 20 mg by mouth., Disp: , Rfl:   •  Prucalopride Succinate (Motegrity) 2 MG tablet, Take 1 tablet by mouth Daily., Disp: 90 tablet, Rfl: 1  •  simethicone (MYLICON,GAS-X) 125 MG capsule capsule, Take 1 capsule by mouth 3 (Three) Times a Day With Meals., Disp: , Rfl:   •  traMADol (ULTRAM) 50 MG tablet, TAKE ONE TABLET BY MOUTH TWICE DAILY AS NEEDED, Disp: 60 tablet, Rfl: 2    Allergies:  Patient has no known allergies.      Objective   Vital Signs:   /82 (BP Location: Left arm, Patient Position: Sitting)   Pulse 73   Temp 98.3 °F (36.8 °C) (Oral)   Ht 162.6 cm (64.02\")   Wt 60.8 kg (134 lb)   BMI 22.99 kg/m²     Physical Exam  Vitals reviewed.   Constitutional:       General: She is not in acute distress.     Appearance: Normal appearance. She is well-developed.   HENT:      Head: Normocephalic and atraumatic.      Right Ear: External ear normal.      " Left Ear: External ear normal.   Eyes:      Extraocular Movements: Extraocular movements intact.      Conjunctiva/sclera: Conjunctivae normal.      Pupils: Pupils are equal, round, and reactive to light.   Cardiovascular:      Rate and Rhythm: Normal rate and regular rhythm.      Heart sounds: No murmur heard.  Pulmonary:      Effort: Pulmonary effort is normal.      Breath sounds: Normal breath sounds. No wheezing, rhonchi or rales.   Abdominal:      General: Bowel sounds are normal. There is no distension.      Palpations: Abdomen is soft.      Tenderness: There is no abdominal tenderness.   Musculoskeletal:         General: Normal range of motion.   Neurological:      Mental Status: She is alert.   Psychiatric:         Mood and Affect: Affect normal.        Lab Results   Component Value Date    CHOL 146 11/03/2021    CHLPL 135 04/15/2021    TRIG 48 11/03/2021    HDL 66 (H) 11/03/2021    LDL 69 11/03/2021     Lab Results   Component Value Date    GLUCOSE 82 03/07/2022    BUN 19 03/07/2022    CREATININE 0.79 03/07/2022    EGFRIFNONA 78 12/29/2021    EGFRIFAFRI 76 02/28/2018    BCR 24.1 03/07/2022    K 4.0 03/07/2022    CO2 26.1 03/07/2022    CALCIUM 9.1 03/07/2022    ALBUMIN 4.10 12/29/2021    LABIL2 2.0 04/15/2021    AST 21 12/29/2021    ALT 26 12/29/2021     Lab Results   Component Value Date    TSH 2.410 02/09/2022            Assessment and Plan    Diagnoses and all orders for this visit:    1. Generalized osteoarthritis (Primary)  Assessment & Plan:  Stable on current regimen.  She follows with Pain Management for the gabapentin.  She still has a fair amount of pain.  No adverse effects. She does require ongoing use of this controlled substance to function.  Tox screen was due today.  Prior tox screen appropriate.  Ugo was run today.  Refills were not needed today.  RTC 3 months.        2. High risk medication use  -     POC Urine Drug Screen [facility]    3. Anxiety  Assessment & Plan:  Stable on  duloxetine 60 mg daily and buspirone 5 mg daily.  She does not need an adjustment to her medication at this time.  Refill sent today.    Orders:  -     busPIRone (BUSPAR) 5 MG tablet; Take 1 tablet by mouth Daily.  Dispense: 90 tablet; Refill: 1    4. Essential hypertension  Assessment & Plan:  Blood pressure goal.  Stable on lisinopril 5 mg daily.  No refills needed.  Labs are reviewed and up-to-date.      5. Mixed hyperlipidemia  Assessment & Plan:  Stable on atorvastatin 80 mg daily.  No refills needed.  Labs are reviewed and up-to-date.      6. Coronary artery disease involving native coronary artery of native heart without angina pectoris  Assessment & Plan:  Following with Cardiology.  She is on aspirin, statin, ACE I.  No refills needed today.  Labs reviewed and up-to-date.      7. Acquired hypothyroidism  Assessment & Plan:  Stable on levothyroxine 125 mcg daily.  Refills needed.  Labs are reviewed and up-to-date.    Orders:  -     levothyroxine (SYNTHROID, LEVOTHROID) 125 MCG tablet; Take 1 tablet by mouth Daily.  Dispense: 90 tablet; Refill: 1      Follow Up   Return in about 3 months (around 8/6/2022) for Recheck.  Patient was given instructions and counseling regarding her condition or for health maintenance advice. Please see specific information pulled into the AVS if appropriate.

## 2022-05-06 NOTE — ASSESSMENT & PLAN NOTE
Stable on duloxetine 60 mg daily and buspirone 5 mg daily.  She does not need an adjustment to her medication at this time.  Refill sent today.

## 2022-05-06 NOTE — ASSESSMENT & PLAN NOTE
Following with Cardiology.  She is on aspirin, statin, ACE I.  No refills needed today.  Labs reviewed and up-to-date.

## 2022-05-23 DIAGNOSIS — M15.9 GENERALIZED OSTEOARTHRITIS: ICD-10-CM

## 2022-05-24 RX ORDER — TRAMADOL HYDROCHLORIDE 50 MG/1
TABLET ORAL
Qty: 60 TABLET | Refills: 2 | Status: SHIPPED | OUTPATIENT
Start: 2022-05-24 | End: 2022-08-26

## 2022-06-10 DIAGNOSIS — K59.04 CHRONIC IDIOPATHIC CONSTIPATION: ICD-10-CM

## 2022-06-10 RX ORDER — PRUCALOPRIDE 2 MG/1
TABLET, FILM COATED ORAL
Qty: 90 TABLET | Refills: 1 | Status: SHIPPED | OUTPATIENT
Start: 2022-06-10 | End: 2022-06-28 | Stop reason: SDUPTHER

## 2022-06-16 ENCOUNTER — TELEPHONE (OUTPATIENT)
Dept: FAMILY MEDICINE CLINIC | Age: 62
End: 2022-06-16

## 2022-06-16 DIAGNOSIS — M81.0 AGE-RELATED OSTEOPOROSIS WITHOUT CURRENT PATHOLOGICAL FRACTURE: ICD-10-CM

## 2022-06-16 RX ORDER — DENOSUMAB 60 MG/ML
60 INJECTION SUBCUTANEOUS
Qty: 1 ML | Refills: 0 | Status: SHIPPED | OUTPATIENT
Start: 2022-06-16 | End: 2023-01-26 | Stop reason: SDUPTHER

## 2022-06-28 ENCOUNTER — OFFICE VISIT (OUTPATIENT)
Dept: GASTROENTEROLOGY | Facility: CLINIC | Age: 62
End: 2022-06-28

## 2022-06-28 VITALS
HEART RATE: 72 BPM | HEIGHT: 64 IN | BODY MASS INDEX: 22.53 KG/M2 | WEIGHT: 132 LBS | DIASTOLIC BLOOD PRESSURE: 84 MMHG | SYSTOLIC BLOOD PRESSURE: 119 MMHG

## 2022-06-28 DIAGNOSIS — K59.04 CHRONIC IDIOPATHIC CONSTIPATION: Primary | ICD-10-CM

## 2022-06-28 DIAGNOSIS — K44.9 HIATAL HERNIA: ICD-10-CM

## 2022-06-28 DIAGNOSIS — K21.9 GASTROESOPHAGEAL REFLUX DISEASE WITHOUT ESOPHAGITIS: ICD-10-CM

## 2022-06-28 DIAGNOSIS — R10.13 EPIGASTRIC PAIN: ICD-10-CM

## 2022-06-28 DIAGNOSIS — K31.84 GASTROPARESIS: ICD-10-CM

## 2022-06-28 PROCEDURE — 99214 OFFICE O/P EST MOD 30 MIN: CPT | Performed by: NURSE PRACTITIONER

## 2022-06-28 RX ORDER — OMEPRAZOLE 20 MG/1
20 CAPSULE, DELAYED RELEASE ORAL DAILY
Qty: 90 CAPSULE | Refills: 1 | Status: SHIPPED | OUTPATIENT
Start: 2022-06-28 | End: 2023-02-10 | Stop reason: SDUPTHER

## 2022-06-28 RX ORDER — SUCRALFATE 1 G/1
1 TABLET ORAL 4 TIMES DAILY
Qty: 56 TABLET | Refills: 0 | Status: SHIPPED | OUTPATIENT
Start: 2022-06-28 | End: 2022-07-12

## 2022-06-28 RX ORDER — PRUCALOPRIDE 2 MG/1
1 TABLET, FILM COATED ORAL DAILY
Qty: 90 TABLET | Refills: 1 | Status: SHIPPED | OUTPATIENT
Start: 2022-06-28 | End: 2022-12-20

## 2022-06-28 NOTE — PROGRESS NOTES
Chief Complaint  Heartburn and Constipation    Addie Yates is a 62 y.o. female who presents to [facility] for follow-up of GERD, hiatal hernia, gastroparesis and chronic constipation.    History of present Illness    Her last follow up for high grade dysplasia in the rectum was with colorectal surgery was in December.  Biopsy with one section of Low-grade squamous intraepithelial lesion (AIN-1, condyloma) at the posterior dentate line.  She reports that they told her she did not need any further follow ups.  Record of that visit is not available at time of visit but has been requested.      Reports that motegrity is working better for her.  She is having a bowel movement once per week.  She's taking citracel, probiotic and colace.      Reports a burning sensation in epigastric region.  It is improved some with gas-x, but she is concerned about it.  She was evaluated by cardiology b/c she initially felt that it was chest pressure.  Cardiology felt that it was GI in nature.      Heartburn is controlled with daily omeprazole.      Past Medical History:   Diagnosis Date   • Anxiety disorder, unspecified    • Atherosclerotic heart disease of native coronary artery without angina pectoris    • Carpal tunnel syndrome on right    • Chronic pain     BASE OF SKULL/UPPERMOST NECK   • Constipation, unspecified    • DDD (degenerative disc disease), cervical    • DDD (degenerative disc disease), lumbar    • Diaphragmatic hernia without obstruction and without gangrene    • Dyskinesia of esophagus    • Gastroparesis    • GERD (gastroesophageal reflux disease)    • Glaucoma    • Hiatal hernia    • Hypothyroidism, unspecified    • Long term (current) use of opiate analgesic    • Medial epicondylitis    • Osteoarthritis    • Osteoporosis without current pathological fracture    • Other idiopathic scoliosis, site unspecified    • Presence of intraocular lens    • Primary generalized (osteo)arthritis     • Right lateral epicondylitis    • Toxic goiter    • Unspecified hemorrhoids        Past Surgical History:   Procedure Laterality Date   • COLONOSCOPY      2007, 2010, 2015 - WITH ADENOMA   • CORONARY ANGIOPLASTY WITH STENT PLACEMENT  11/2015   • ENDOSCOPY      2008, 2016   • HYSTERECTOMY  2004   • LAPAROSCOPIC CHOLECYSTECTOMY  2017   • REFRACTIVE SURGERY Bilateral    • THYROID BIOPSY Right    • TUBAL ABDOMINAL LIGATION      BI-TUBAL         Current Outpatient Medications:   •  aspirin 81 MG EC tablet, Take 1 tablet by mouth Daily., Disp: , Rfl:   •  atorvastatin (LIPITOR) 80 MG tablet, Take 1 tablet by mouth Daily., Disp: , Rfl:   •  busPIRone (BUSPAR) 5 MG tablet, Take 1 tablet by mouth Daily., Disp: 90 tablet, Rfl: 1  •  Calcium 500-125 MG-UNIT tablet, Take 1 tablet by mouth Daily., Disp: , Rfl:   •  denosumab (Prolia) 60 MG/ML solution prefilled syringe syringe, Inject 1 mL under the skin into the appropriate area as directed Every 6 (Six) Months for 180 days., Disp: 1 mL, Rfl: 0  •  diclofenac (VOLTAREN) 75 MG EC tablet, Take 75 mg by mouth 2 (Two) Times a Day As Needed., Disp: , Rfl:   •  docusate sodium (COLACE) 100 MG capsule, Take 100 mg by mouth 2 (Two) Times a Day., Disp: , Rfl:   •  DULoxetine (CYMBALTA) 60 MG capsule, Take 1 capsule by mouth Daily., Disp: 90 capsule, Rfl: 1  •  gabapentin (NEURONTIN) 300 MG capsule, Take 300 mg by mouth 2 (Two) Times a Day., Disp: , Rfl:   •  levothyroxine (SYNTHROID, LEVOTHROID) 125 MCG tablet, Take 1 tablet by mouth Daily., Disp: 90 tablet, Rfl: 1  •  lisinopril (PRINIVIL,ZESTRIL) 5 MG tablet, Take 1 tablet by mouth Daily., Disp: , Rfl:   •  omeprazole (priLOSEC) 20 MG capsule, Take 1 capsule by mouth Daily., Disp: 90 capsule, Rfl: 1  •  Prucalopride Succinate (Motegrity) 2 MG tablet, Take 1 tablet by mouth Daily., Disp: 90 tablet, Rfl: 1  •  simethicone (MYLICON,GAS-X) 125 MG capsule capsule, Take 1 capsule by mouth 3 (Three) Times a Day With Meals., Disp: , Rfl:  "  •  traMADol (ULTRAM) 50 MG tablet, TAKE ONE TABLET BY MOUTH TWICE DAILY AS NEEDED, Disp: 60 tablet, Rfl: 2  •  sucralfate (Carafate) 1 g tablet, Take 1 tablet by mouth 4 (Four) Times a Day for 14 days., Disp: 56 tablet, Rfl: 0     No Known Allergies    Family History   Problem Relation Age of Onset   • Leukemia Other    • Kidney cancer Other    • Diabetes Other         Social History     Social History Narrative   • Not on file       Objective       Vital Signs:   /84 (BP Location: Left arm, Patient Position: Sitting, Cuff Size: Adult)   Pulse 72   Ht 162.6 cm (64\")   Wt 59.9 kg (132 lb)   BMI 22.66 kg/m²       Physical Exam  Constitutional:       General: She is not in acute distress.     Appearance: Normal appearance. She is well-developed and normal weight.   HENT:      Head: Normocephalic and atraumatic.   Eyes:      Conjunctiva/sclera: Conjunctivae normal.      Pupils: Pupils are equal, round, and reactive to light.      Visual Fields: Right eye visual fields normal and left eye visual fields normal.   Cardiovascular:      Rate and Rhythm: Normal rate and regular rhythm.      Heart sounds: Normal heart sounds.   Pulmonary:      Effort: Pulmonary effort is normal. No retractions.      Breath sounds: Normal breath sounds and air entry.   Abdominal:      General: Bowel sounds are normal.      Palpations: Abdomen is soft.      Tenderness: There is no abdominal tenderness.      Comments: No appreciable hepatosplenomegaly or ascites   Musculoskeletal:         General: Normal range of motion.      Cervical back: Neck supple.      Right lower leg: No edema.      Left lower leg: No edema.   Lymphadenopathy:      Cervical: No cervical adenopathy.   Skin:     General: Skin is warm and dry.      Findings: No lesion.   Neurological:      General: No focal deficit present.      Mental Status: She is alert and oriented to person, place, and time.   Psychiatric:         Mood and Affect: Mood and affect normal.    "      Behavior: Behavior normal.         Result Review :     The following data was reviewed by: KEVON Mahan on 06/28/2022:      CMP    CMP 11/3/21 12/29/21 3/7/22   Glucose 101 (A) 93 82   BUN 24 (A) 19 19   Creatinine 0.84 0.75 0.79   eGFR Non African Am 69 78    Sodium 138 145 140   Potassium 4.3 4.1 4.0   Chloride 103 109 (A) 104   Calcium 8.9 8.5 (A) 9.1   Albumin 4.40 4.10    Total Bilirubin 0.3 0.4    Alkaline Phosphatase 83 63    AST (SGOT) 41 (A) 21    ALT (SGPT) 45 (A) 26    (A) Abnormal value       Comments are available for some flowsheets but are not being displayed.                           Assessment and Plan    Diagnoses and all orders for this visit:    1. Chronic idiopathic constipation (Primary)  -     Prucalopride Succinate (Motegrity) 2 MG tablet; Take 1 tablet by mouth Daily.  Dispense: 90 tablet; Refill: 1    2. Gastroparesis    3. Gastroesophageal reflux disease without esophagitis  -     omeprazole (priLOSEC) 20 MG capsule; Take 1 capsule by mouth Daily.  Dispense: 90 capsule; Refill: 1  -     Case Request; Standing  -     Case Request    4. Hiatal hernia  -     omeprazole (priLOSEC) 20 MG capsule; Take 1 capsule by mouth Daily.  Dispense: 90 capsule; Refill: 1  -     Case Request; Standing  -     Case Request    5. Epigastric pain  -     sucralfate (Carafate) 1 g tablet; Take 1 tablet by mouth 4 (Four) Times a Day for 14 days.  Dispense: 56 tablet; Refill: 0  -     Case Request; Standing  -     Case Request    Other orders  -     Follow Anesthesia Guidelines / Protocol; Future  -     Obtain Informed Consent; Future          ESOPHAGOGASTRODUODENOSCOPY (N/A) The risk of the endoscopy were discussed in detail. Possible risks/complications, benefits, and alternatives to surgical or invasive procedure have been explained to patient and/or legal guardian; risks include bleeding, infection, and perforation. Patient has been evaluated and can tolerate anesthesia and/or sedation.            Follow Up   Return for F/U after procedure.  Patient was given instructions and counseling regarding her condition or for health maintenance advice. Please see specific information pulled into the AVS if appropriate.

## 2022-07-14 ENCOUNTER — PRIOR AUTHORIZATION (OUTPATIENT)
Dept: FAMILY MEDICINE CLINIC | Age: 62
End: 2022-07-14

## 2022-08-05 DIAGNOSIS — F41.9 ANXIETY: ICD-10-CM

## 2022-08-05 RX ORDER — DULOXETIN HYDROCHLORIDE 60 MG/1
CAPSULE, DELAYED RELEASE ORAL
Qty: 90 CAPSULE | Refills: 1 | Status: SHIPPED | OUTPATIENT
Start: 2022-08-05 | End: 2022-11-08 | Stop reason: SDUPTHER

## 2022-08-10 ENCOUNTER — OFFICE VISIT (OUTPATIENT)
Dept: FAMILY MEDICINE CLINIC | Age: 62
End: 2022-08-10

## 2022-08-10 ENCOUNTER — LAB (OUTPATIENT)
Dept: LAB | Facility: HOSPITAL | Age: 62
End: 2022-08-10

## 2022-08-10 VITALS
SYSTOLIC BLOOD PRESSURE: 105 MMHG | HEART RATE: 62 BPM | WEIGHT: 134 LBS | DIASTOLIC BLOOD PRESSURE: 72 MMHG | HEIGHT: 64 IN | BODY MASS INDEX: 22.88 KG/M2

## 2022-08-10 DIAGNOSIS — F41.9 ANXIETY: ICD-10-CM

## 2022-08-10 DIAGNOSIS — M15.9 GENERALIZED OSTEOARTHRITIS: Primary | ICD-10-CM

## 2022-08-10 DIAGNOSIS — E03.9 ACQUIRED HYPOTHYROIDISM: ICD-10-CM

## 2022-08-10 DIAGNOSIS — M81.0 AGE-RELATED OSTEOPOROSIS WITHOUT CURRENT PATHOLOGICAL FRACTURE: ICD-10-CM

## 2022-08-10 DIAGNOSIS — K21.9 GASTROESOPHAGEAL REFLUX DISEASE WITHOUT ESOPHAGITIS: ICD-10-CM

## 2022-08-10 DIAGNOSIS — I10 ESSENTIAL HYPERTENSION: ICD-10-CM

## 2022-08-10 DIAGNOSIS — Z79.899 HIGH RISK MEDICATION USE: ICD-10-CM

## 2022-08-10 DIAGNOSIS — E78.2 MIXED HYPERLIPIDEMIA: ICD-10-CM

## 2022-08-10 DIAGNOSIS — I25.10 CORONARY ARTERY DISEASE INVOLVING NATIVE CORONARY ARTERY OF NATIVE HEART WITHOUT ANGINA PECTORIS: ICD-10-CM

## 2022-08-10 LAB
ALBUMIN SERPL-MCNC: 4.2 G/DL (ref 3.5–5.2)
ALBUMIN/GLOB SERPL: 1.9 G/DL
ALP SERPL-CCNC: 79 U/L (ref 39–117)
ALT SERPL W P-5'-P-CCNC: 23 U/L (ref 1–33)
AMPHET+METHAMPHET UR QL: NEGATIVE
AMPHETAMINES UR QL: NEGATIVE
ANION GAP SERPL CALCULATED.3IONS-SCNC: 9.9 MMOL/L (ref 5–15)
AST SERPL-CCNC: 26 U/L (ref 1–32)
BARBITURATES UR QL SCN: NEGATIVE
BENZODIAZ UR QL SCN: NEGATIVE
BILIRUB SERPL-MCNC: 0.4 MG/DL (ref 0–1.2)
BUN SERPL-MCNC: 20 MG/DL (ref 8–23)
BUN/CREAT SERPL: 24.4 (ref 7–25)
BUPRENORPHINE SERPL-MCNC: NEGATIVE NG/ML
CALCIUM SPEC-SCNC: 9.5 MG/DL (ref 8.6–10.5)
CANNABINOIDS SERPL QL: NEGATIVE
CHLORIDE SERPL-SCNC: 104 MMOL/L (ref 98–107)
CHOLEST SERPL-MCNC: 142 MG/DL (ref 0–200)
CO2 SERPL-SCNC: 27.1 MMOL/L (ref 22–29)
COCAINE UR QL: NEGATIVE
CREAT SERPL-MCNC: 0.82 MG/DL (ref 0.57–1)
EGFRCR SERPLBLD CKD-EPI 2021: 80.5 ML/MIN/1.73
EXPIRATION DATE: NORMAL
GLOBULIN UR ELPH-MCNC: 2.2 GM/DL
GLUCOSE SERPL-MCNC: 99 MG/DL (ref 65–99)
HDLC SERPL-MCNC: 69 MG/DL (ref 40–60)
LDLC SERPL CALC-MCNC: 63 MG/DL (ref 0–100)
LDLC/HDLC SERPL: 0.93 {RATIO}
Lab: NORMAL
MDMA UR QL SCN: NEGATIVE
METHADONE UR QL SCN: NEGATIVE
OPIATES UR QL: NEGATIVE
OXYCODONE UR QL SCN: NEGATIVE
PCP UR QL SCN: NEGATIVE
POTASSIUM SERPL-SCNC: 4.3 MMOL/L (ref 3.5–5.2)
PROT SERPL-MCNC: 6.4 G/DL (ref 6–8.5)
SODIUM SERPL-SCNC: 141 MMOL/L (ref 136–145)
TRIGL SERPL-MCNC: 44 MG/DL (ref 0–150)
TSH SERPL DL<=0.05 MIU/L-ACNC: 0.18 UIU/ML (ref 0.27–4.2)
VLDLC SERPL-MCNC: 10 MG/DL (ref 5–40)

## 2022-08-10 PROCEDURE — 80305 DRUG TEST PRSMV DIR OPT OBS: CPT | Performed by: FAMILY MEDICINE

## 2022-08-10 PROCEDURE — 99214 OFFICE O/P EST MOD 30 MIN: CPT | Performed by: FAMILY MEDICINE

## 2022-08-10 PROCEDURE — 80061 LIPID PANEL: CPT

## 2022-08-10 PROCEDURE — 80053 COMPREHEN METABOLIC PANEL: CPT

## 2022-08-10 PROCEDURE — 84443 ASSAY THYROID STIM HORMONE: CPT

## 2022-08-10 PROCEDURE — 36415 COLL VENOUS BLD VENIPUNCTURE: CPT

## 2022-08-10 RX ORDER — BUSPIRONE HYDROCHLORIDE 5 MG/1
5 TABLET ORAL 2 TIMES DAILY
Qty: 180 TABLET | Refills: 1 | Status: SHIPPED | OUTPATIENT
Start: 2022-08-10 | End: 2022-11-08 | Stop reason: SDUPTHER

## 2022-08-10 RX ORDER — LEVOTHYROXINE SODIUM 0.12 MG/1
125 TABLET ORAL DAILY
Qty: 90 TABLET | Refills: 1 | Status: SHIPPED | OUTPATIENT
Start: 2022-08-10 | End: 2022-08-11 | Stop reason: SDUPTHER

## 2022-08-10 NOTE — PROGRESS NOTES
"Chief Complaint  Osteoarthritis (3 month follow up)    Subjective          Addie Yates presents to [facility] today for follow-up of chronic issues.    She is on tramadol and duloxetine for chronic neck and back pain due to diffuse arthritis and scoliosis.  She was on gabapentin but didn't feel it was making a difference, so she stopped it.  Has not noticed any change since then.  She denies adverse effects.  Was following with Dr. Simon at [facility] but has not been there recently.  She was getting injections but without much relief.      She went to see Dr. Reed about the tingling due to carpal tunnel in the L hand.  She just had EMG/NCV done at [facility] yesterday.  Waiting to see the results of that before they decide about surgery.      She is on duloxetine and buspirone for anxiety.  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  She denies panic attacks but reports a steady, baseline level of anxiety.  \"I'll be shaking sometimes.\"      She is on lisinopril, atorvastatin, and aspirin for CAD s/p stenting in 11/2015.  She follows with Cardiology Wayne Daniels.      She is on levothyroxine for hypothyroidism.  No cold/heat intolerance, changes in skin or hair.    She is on omeprazole for GERD and esophageal spasm.  Most recent EGD showed hiatal hernia and gastritis.  Gastric emptying study has shown gastroparesis.  She is on Motegrity, docusate, and lactulose for constipation.  The Motegrity has been helpful.  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  Continues to follow with GI.  Dr. Leroy is planning to do an EGD in November.      She is on Prolia for osteoporosis.  Her DEXA is up-to-date, last done 2/2021 and this showed osteopenia.  Hips are improved.      Current Outpatient Medications:   •  aspirin 81 MG EC tablet, Take 1 tablet by mouth " "Daily., Disp: , Rfl:   •  atorvastatin (LIPITOR) 80 MG tablet, Take 1 tablet by mouth Daily., Disp: , Rfl:   •  busPIRone (BUSPAR) 5 MG tablet, Take 1 tablet by mouth 2 (Two) Times a Day., Disp: 180 tablet, Rfl: 1  •  Calcium 500-125 MG-UNIT tablet, Take 1 tablet by mouth Daily., Disp: , Rfl:   •  denosumab (Prolia) 60 MG/ML solution prefilled syringe syringe, Inject 1 mL under the skin into the appropriate area as directed Every 6 (Six) Months for 180 days., Disp: 1 mL, Rfl: 0  •  docusate sodium (COLACE) 100 MG capsule, Take 100 mg by mouth 2 (Two) Times a Day., Disp: , Rfl:   •  DULoxetine (CYMBALTA) 60 MG capsule, TAKE ONE CAPSULE BY MOUTH EVERY DAY, Disp: 90 capsule, Rfl: 1  •  levothyroxine (SYNTHROID, LEVOTHROID) 125 MCG tablet, Take 1 tablet by mouth Daily., Disp: 90 tablet, Rfl: 1  •  lisinopril (PRINIVIL,ZESTRIL) 5 MG tablet, Take 1 tablet by mouth Daily., Disp: , Rfl:   •  methylcellulose, Laxative, (CITRUCEL) 500 MG tablet tablet, Take 2 tablets by mouth Every 4 (Four) Hours As Needed., Disp: , Rfl:   •  omeprazole (priLOSEC) 20 MG capsule, Take 1 capsule by mouth Daily., Disp: 90 capsule, Rfl: 1  •  Prucalopride Succinate (Motegrity) 2 MG tablet, Take 1 tablet by mouth Daily., Disp: 90 tablet, Rfl: 1  •  simethicone (MYLICON,GAS-X) 125 MG capsule capsule, Take 1 capsule by mouth 3 (Three) Times a Day With Meals., Disp: , Rfl:   •  traMADol (ULTRAM) 50 MG tablet, TAKE ONE TABLET BY MOUTH TWICE DAILY AS NEEDED, Disp: 60 tablet, Rfl: 2    Allergies:  Patient has no known allergies.      Objective   Vital Signs:   /72 (BP Location: Left arm, Patient Position: Sitting)   Pulse 62   Ht 162.6 cm (64.02\")   Wt 60.8 kg (134 lb)   BMI 22.99 kg/m²     Physical Exam  Vitals reviewed.   Constitutional:       General: She is not in acute distress.     Appearance: Normal appearance. She is well-developed.   HENT:      Head: Normocephalic and atraumatic.      Right Ear: External ear normal.      Left Ear: " External ear normal.   Eyes:      Extraocular Movements: Extraocular movements intact.      Conjunctiva/sclera: Conjunctivae normal.      Pupils: Pupils are equal, round, and reactive to light.   Cardiovascular:      Rate and Rhythm: Normal rate and regular rhythm.      Heart sounds: No murmur heard.  Pulmonary:      Effort: Pulmonary effort is normal.      Breath sounds: Normal breath sounds. No wheezing, rhonchi or rales.   Abdominal:      General: Bowel sounds are normal. There is no distension.      Palpations: Abdomen is soft.      Tenderness: There is no abdominal tenderness.   Musculoskeletal:         General: Tenderness (bilateral cervical and lumbar paraspinals) present. Normal range of motion.   Neurological:      Mental Status: She is alert.   Psychiatric:         Mood and Affect: Affect normal.        Lab Results   Component Value Date    CHOL 146 11/03/2021    CHLPL 135 04/15/2021    TRIG 48 11/03/2021    HDL 66 (H) 11/03/2021    LDL 69 11/03/2021     Lab Results   Component Value Date    GLUCOSE 82 03/07/2022    BUN 19 03/07/2022    CREATININE 0.79 03/07/2022    EGFRIFNONA 78 12/29/2021    EGFRIFAFRI 76 02/28/2018    BCR 24.1 03/07/2022    K 4.0 03/07/2022    CO2 26.1 03/07/2022    CALCIUM 9.1 03/07/2022    ALBUMIN 4.10 12/29/2021    LABIL2 2.0 04/15/2021    AST 21 12/29/2021    ALT 26 12/29/2021     Lab Results   Component Value Date    TSH 2.410 02/09/2022            Assessment and Plan    Diagnoses and all orders for this visit:    1. Generalized osteoarthritis (Primary)  Assessment & Plan:  Stable on current regimen.  She does continue to have problems with neck and back pain, but they are not severe enough that she would like to go back to Pain Management at this time for adjustments to her regimen.  No adverse effects. She does require ongoing use of this controlled substance to function.  Tox screen was due today.  Prior tox screen appropriate.  HAYLEY was run today.  Refills were not needed  today.  RTC 3 months.        2. High risk medication use  -     POC Urine Drug Screen [facility]    3. Essential hypertension  Assessment & Plan:  Blood pressure goal.  Continue lisinopril 5 mg daily.  No refills needed.  Checking labs.    Orders:  -     Lipid Panel; Future  -     Comprehensive Metabolic Panel; Future    4. Mixed hyperlipidemia  Assessment & Plan:  Stable on atorvastatin 80 mg daily.  No refills needed.  Checking labs.      5. Coronary artery disease involving native coronary artery of native heart without angina pectoris  Overview:  Follows with Wayne LUND, [facility], [place], [place]    Assessment & Plan:  Stable on aspirin, ACEI and statin.  No refills needed.  Checking labs.      6. Acquired hypothyroidism  Assessment & Plan:  Stable on levothyroxine 125 mcg daily.  Refill sent as below.  Checking labs.    Orders:  -     levothyroxine (SYNTHROID, LEVOTHROID) 125 MCG tablet; Take 1 tablet by mouth Daily.  Dispense: 90 tablet; Refill: 1  -     TSH; Future    7. Gastroesophageal reflux disease without esophagitis  Assessment & Plan:  Following with GI.  Dr. Leroy is planning to do an EGD later this fall.  No refills needed.      8. Age-related osteoporosis without current pathological fracture  Overview:  On Prolia.  Last DEXA is up-to-date, done 2/2021 showing improved osteopenia.    Assessment & Plan:  She has been getting these down at [facility] (last done 7/14) but would like to switch to our office.  We will get her set up on our roster.      9. Anxiety  Assessment & Plan:  She still endorses significant level of baseline anxiety.  I am going to increase her buspirone to 5 mg twice daily and we will continue her on duloxetine 60 mg daily.    Orders:  -     busPIRone (BUSPAR) 5 MG tablet; Take 1 tablet by mouth 2 (Two) Times a Day.  Dispense: 180 tablet; Refill: 1        Follow Up   Return in about 3 months (around 11/10/2022) for Medicare Wellness.  Patient was given  instructions and counseling regarding her condition or for health maintenance advice. Please see specific information pulled into the AVS if appropriate.

## 2022-08-10 NOTE — ASSESSMENT & PLAN NOTE
She still endorses significant level of baseline anxiety.  I am going to increase her buspirone to 5 mg twice daily and we will continue her on duloxetine 60 mg daily.

## 2022-08-10 NOTE — ASSESSMENT & PLAN NOTE
Stable on current regimen.  She does continue to have problems with neck and back pain, but they are not severe enough that she would like to go back to Pain Management at this time for adjustments to her regimen.  No adverse effects. She does require ongoing use of this controlled substance to function.  Tox screen was due today.  Prior tox screen appropriate.  HAYLEY was run today.  Refills were not needed today.  RTC 3 months.

## 2022-08-10 NOTE — ASSESSMENT & PLAN NOTE
She has been getting these down at [facility] (last done 7/14) but would like to switch to our office.  We will get her set up on our roster.

## 2022-08-11 RX ORDER — LEVOTHYROXINE SODIUM 112 UG/1
112 TABLET ORAL DAILY
Qty: 30 TABLET | Refills: 5 | Status: SHIPPED | OUTPATIENT
Start: 2022-08-11 | End: 2022-09-27 | Stop reason: SDUPTHER

## 2022-08-25 DIAGNOSIS — M15.9 GENERALIZED OSTEOARTHRITIS: ICD-10-CM

## 2022-08-26 RX ORDER — TRAMADOL HYDROCHLORIDE 50 MG/1
TABLET ORAL
Qty: 60 TABLET | Refills: 2 | Status: SHIPPED | OUTPATIENT
Start: 2022-08-26 | End: 2022-12-05

## 2022-09-22 ENCOUNTER — TELEPHONE (OUTPATIENT)
Dept: FAMILY MEDICINE CLINIC | Age: 62
End: 2022-09-22

## 2022-09-22 DIAGNOSIS — E03.9 ACQUIRED HYPOTHYROIDISM: Primary | ICD-10-CM

## 2022-09-22 NOTE — TELEPHONE ENCOUNTER
----- Message from Jammie Phillips MD sent at 8/11/2022  2:55 PM EDT -----  Please call pt to come back in for TSH recheck.  Please place order and pend to me, dx hypothyroidism.  Thanks, BZBUZZ

## 2022-09-26 ENCOUNTER — LAB (OUTPATIENT)
Dept: LAB | Facility: HOSPITAL | Age: 62
End: 2022-09-26

## 2022-09-26 DIAGNOSIS — E03.9 ACQUIRED HYPOTHYROIDISM: ICD-10-CM

## 2022-09-26 LAB — TSH SERPL DL<=0.05 MIU/L-ACNC: 0.18 UIU/ML (ref 0.27–4.2)

## 2022-09-26 PROCEDURE — 36415 COLL VENOUS BLD VENIPUNCTURE: CPT

## 2022-09-26 PROCEDURE — 84443 ASSAY THYROID STIM HORMONE: CPT

## 2022-09-27 ENCOUNTER — TELEPHONE (OUTPATIENT)
Dept: FAMILY MEDICINE CLINIC | Age: 62
End: 2022-09-27

## 2022-09-27 DIAGNOSIS — Z12.31 ENCOUNTER FOR SCREENING MAMMOGRAM FOR BREAST CANCER: Primary | ICD-10-CM

## 2022-09-27 RX ORDER — LEVOTHYROXINE SODIUM 0.1 MG/1
100 TABLET ORAL DAILY
Qty: 30 TABLET | Refills: 5 | Status: SHIPPED | OUTPATIENT
Start: 2022-09-27 | End: 2023-03-14

## 2022-09-27 NOTE — TELEPHONE ENCOUNTER
----- Message from Jammie Phillips MD sent at 9/24/2021 10:42 PM EDT -----  Regarding: f/u screening mammo  Please call pt to let her know that she is due for her yearly screening mammogram.  Please pend order for screening mammo and send to me.  Thanks, MASON

## 2022-10-21 ENCOUNTER — TELEPHONE (OUTPATIENT)
Dept: GASTROENTEROLOGY | Facility: CLINIC | Age: 62
End: 2022-10-21

## 2022-11-04 ENCOUNTER — ANESTHESIA EVENT (OUTPATIENT)
Dept: GASTROENTEROLOGY | Facility: HOSPITAL | Age: 62
End: 2022-11-04

## 2022-11-04 ENCOUNTER — HOSPITAL ENCOUNTER (OUTPATIENT)
Facility: HOSPITAL | Age: 62
Setting detail: HOSPITAL OUTPATIENT SURGERY
Discharge: HOME | End: 2022-11-04
Attending: INTERNAL MEDICINE | Admitting: INTERNAL MEDICINE

## 2022-11-04 ENCOUNTER — ANESTHESIA (OUTPATIENT)
Dept: GASTROENTEROLOGY | Facility: HOSPITAL | Age: 62
End: 2022-11-04

## 2022-11-04 VITALS
RESPIRATION RATE: 16 BRPM | SYSTOLIC BLOOD PRESSURE: 104 MMHG | WEIGHT: 134.04 LBS | DIASTOLIC BLOOD PRESSURE: 74 MMHG | HEART RATE: 57 BPM | TEMPERATURE: 97.4 F | HEIGHT: 64 IN | OXYGEN SATURATION: 99 % | BODY MASS INDEX: 22.88 KG/M2

## 2022-11-04 DIAGNOSIS — R10.13 EPIGASTRIC PAIN: ICD-10-CM

## 2022-11-04 DIAGNOSIS — K21.9 GASTROESOPHAGEAL REFLUX DISEASE WITHOUT ESOPHAGITIS: ICD-10-CM

## 2022-11-04 DIAGNOSIS — K44.9 HIATAL HERNIA: ICD-10-CM

## 2022-11-04 PROCEDURE — 25010000002 PROPOFOL 10 MG/ML EMULSION: Performed by: NURSE ANESTHETIST, CERTIFIED REGISTERED

## 2022-11-04 PROCEDURE — 88305 TISSUE EXAM BY PATHOLOGIST: CPT | Performed by: INTERNAL MEDICINE

## 2022-11-04 PROCEDURE — 43239 EGD BIOPSY SINGLE/MULTIPLE: CPT | Performed by: INTERNAL MEDICINE

## 2022-11-04 RX ORDER — LIDOCAINE HYDROCHLORIDE 20 MG/ML
INJECTION, SOLUTION EPIDURAL; INFILTRATION; INTRACAUDAL; PERINEURAL AS NEEDED
Status: DISCONTINUED | OUTPATIENT
Start: 2022-11-04 | End: 2022-11-04 | Stop reason: SURG

## 2022-11-04 RX ORDER — SODIUM CHLORIDE, SODIUM LACTATE, POTASSIUM CHLORIDE, CALCIUM CHLORIDE 600; 310; 30; 20 MG/100ML; MG/100ML; MG/100ML; MG/100ML
30 INJECTION, SOLUTION INTRAVENOUS CONTINUOUS
Status: DISCONTINUED | OUTPATIENT
Start: 2022-11-04 | End: 2022-11-04 | Stop reason: HOSPADM

## 2022-11-04 RX ORDER — PROPOFOL 10 MG/ML
VIAL (ML) INTRAVENOUS AS NEEDED
Status: DISCONTINUED | OUTPATIENT
Start: 2022-11-04 | End: 2022-11-04 | Stop reason: SURG

## 2022-11-04 RX ADMIN — LIDOCAINE HYDROCHLORIDE 100 MG: 20 INJECTION, SOLUTION EPIDURAL; INFILTRATION; INTRACAUDAL; PERINEURAL at 11:05

## 2022-11-04 RX ADMIN — PROPOFOL 100 MG: 10 INJECTION, EMULSION INTRAVENOUS at 11:05

## 2022-11-04 RX ADMIN — PROPOFOL 200 MCG/KG/MIN: 10 INJECTION, EMULSION INTRAVENOUS at 11:05

## 2022-11-04 RX ADMIN — SODIUM CHLORIDE, POTASSIUM CHLORIDE, SODIUM LACTATE AND CALCIUM CHLORIDE 30 ML/HR: 600; 310; 30; 20 INJECTION, SOLUTION INTRAVENOUS at 10:55

## 2022-11-04 NOTE — ANESTHESIA PREPROCEDURE EVALUATION
Anesthesia Evaluation     Patient summary reviewed and Nursing notes reviewed   no history of anesthetic complications:  NPO Solid Status: > 8 hours  NPO Liquid Status: > 2 hours           Airway   Mallampati: I  TM distance: >3 FB  Neck ROM: full  No difficulty expected  Dental    (+) edentulous    Pulmonary - normal exam    breath sounds clear to auscultation  (+) a smoker Former,   Cardiovascular - normal exam  Exercise tolerance: good (4-7 METS)    Rhythm: regular  Rate: normal    (+) hypertension well controlled less than 2 medications, past MI  >12 months, CAD, cardiac stents more than 12 months ago hyperlipidemia,       Neuro/Psych  (+) numbness, psychiatric history Anxiety,    GI/Hepatic/Renal/Endo    (+)  hiatal hernia, GERD,  thyroid problem hypothyroidism    Musculoskeletal (-) negative ROS    Abdominal    Substance History - negative use     OB/GYN negative ob/gyn ROS         Other - negative ROS       ROS/Med Hx Other: PAT Nursing Notes unavailable.                   Anesthesia Plan    ASA 2     general     (Patient understands anesthesia not responsible for dental damage.)  intravenous induction     Anesthetic plan, risks, benefits, and alternatives have been provided, discussed and informed consent has been obtained with: patient.  Pre-procedure education provided  Plan discussed with CRNA.        CODE STATUS:

## 2022-11-04 NOTE — ANESTHESIA POSTPROCEDURE EVALUATION
Patient: Addie Yates    Procedure Summary     Date: 11/04/22 Room / Location: [facility] ENDOSCOPY 1 / [facility] ENDOSCOPY    Anesthesia Start: 1103 Anesthesia Stop: 1118    Procedure: ESOPHAGOGASTRODUODENOSCOPY with biopsy Diagnosis:       Gastroesophageal reflux disease without esophagitis      Hiatal hernia      Epigastric pain      (Gastroesophageal reflux disease without esophagitis [K21.9])      (Hiatal hernia [K44.9])      (Epigastric pain [R10.13])    Surgeons: Yodit Leroy MD Provider: Reyes, Mirabelle, DO    Anesthesia Type: general ASA Status: 2          Anesthesia Type: general    Vitals  Vitals Value Taken Time   /74 11/04/22 1137   Temp 36.3 °C (97.4 °F) 11/04/22 1135   Pulse 58 11/04/22 1138   Resp 16 11/04/22 1135   SpO2 98 % 11/04/22 1138   Vitals shown include unvalidated device data.        Post Anesthesia Care and Evaluation    Patient location during evaluation: bedside  Patient participation: complete - patient participated  Level of consciousness: awake  Pain management: adequate    Airway patency: patent  Anesthetic complications: No anesthetic complications  PONV Status: none  Cardiovascular status: acceptable and stable  Respiratory status: acceptable  Hydration status: acceptable    Comments: An Anesthesiologist personally participated in the most demanding procedures (including induction and emergence if applicable) in the anesthesia plan, monitored the course of anesthesia administration at frequent intervals and remained physically present and available for immediate diagnosis and treatment of emergencies.

## 2022-11-04 NOTE — H&P
Pre Procedure History & Physical    Chief Complaint:   Epigastric pain    Subjective     HPI:   61 yo F here for eval of epigastric pain.    Past Medical History:   Past Medical History:   Diagnosis Date   • Anxiety disorder, unspecified    • Atherosclerotic heart disease of native coronary artery without angina pectoris    • Carpal tunnel syndrome on right    • Chronic pain     BASE OF SKULL/UPPERMOST NECK   • Constipation, unspecified    • DDD (degenerative disc disease), cervical    • DDD (degenerative disc disease), lumbar    • Diaphragmatic hernia without obstruction and without gangrene    • Dyskinesia of esophagus    • Gastroparesis    • GERD (gastroesophageal reflux disease)    • Glaucoma    • Hiatal hernia    • Hypothyroidism, unspecified    • Long term (current) use of opiate analgesic    • Medial epicondylitis    • MI (myocardial infarction) ([facility])     2015   • Osteoarthritis    • Osteoporosis without current pathological fracture    • Other idiopathic scoliosis, site unspecified    • Presence of intraocular lens    • Primary generalized (osteo)arthritis    • Right lateral epicondylitis    • Toxic goiter    • Unspecified hemorrhoids        Past Surgical History:  Past Surgical History:   Procedure Laterality Date   • CARPAL TUNNEL RELEASE Left    • COLONOSCOPY      2007, 2010, 2015 - WITH ADENOMA   • CORONARY ANGIOPLASTY WITH STENT PLACEMENT  11/2015   • ENDOSCOPY      2008, 2016   • HYSTERECTOMY  2004   • LAPAROSCOPIC CHOLECYSTECTOMY  2017   • REFRACTIVE SURGERY Bilateral    • THYROID BIOPSY Right    • TUBAL ABDOMINAL LIGATION      BI-TUBAL       Family History:  Family History   Problem Relation Age of Onset   • Leukemia Other    • Kidney cancer Other    • Diabetes Other    • Malig Hyperthermia Neg Hx        Social History:   reports that she quit smoking about 18 years ago. Her smoking use included cigarettes. She has never used smokeless tobacco. She reports that she does not currently use alcohol. She  "reports that she does not use drugs.    Medications:   Medications Prior to Admission   Medication Sig Dispense Refill Last Dose   • aspirin 81 MG EC tablet Take 1 tablet by mouth Every Night.      • atorvastatin (LIPITOR) 80 MG tablet Take 1 tablet by mouth Daily.      • busPIRone (BUSPAR) 5 MG tablet Take 1 tablet by mouth 2 (Two) Times a Day. 180 tablet 1    • Calcium 500-125 MG-UNIT tablet Take 1 tablet by mouth Daily.      • denosumab (Prolia) 60 MG/ML solution prefilled syringe syringe Inject 1 mL under the skin into the appropriate area as directed Every 6 (Six) Months for 180 days. 1 mL 0    • docusate sodium (COLACE) 100 MG capsule Take 100 mg by mouth 2 (Two) Times a Day.      • DULoxetine (CYMBALTA) 60 MG capsule TAKE ONE CAPSULE BY MOUTH EVERY DAY 90 capsule 1    • levothyroxine (SYNTHROID, LEVOTHROID) 100 MCG tablet Take 1 tablet by mouth Daily. 30 tablet 5    • lisinopril (PRINIVIL,ZESTRIL) 5 MG tablet Take 1 tablet by mouth Every Night.      • methylcellulose, Laxative, (CITRUCEL) 500 MG tablet tablet Take 2 tablets by mouth Every 4 (Four) Hours As Needed.      • omeprazole (priLOSEC) 20 MG capsule Take 1 capsule by mouth Daily. 90 capsule 1    • Prucalopride Succinate (Motegrity) 2 MG tablet Take 1 tablet by mouth Daily. 90 tablet 1    • simethicone (MYLICON,GAS-X) 125 MG capsule capsule Take 1 capsule by mouth 3 (Three) Times a Day With Meals.      • traMADol (ULTRAM) 50 MG tablet TAKE ONE TABLET BY MOUTH TWICE DAILY AS NEEDED 60 tablet 2        Allergies:  Patient has no known allergies.    ROS:    Pertinent items are noted in HPI     Objective     Height 162.6 cm (64\"), weight 60.8 kg (134 lb 0.6 oz).    Physical Exam   Constitutional: Pt is oriented to person, place, and time and well-developed, well-nourished, and in no distress.   Mouth/Throat: Oropharynx is clear and moist.   Neck: Normal range of motion.   Cardiovascular: Normal rate, regular rhythm and normal heart sounds.  "   Pulmonary/Chest: Effort normal and breath sounds normal.   Abdominal: Soft. Nontender  Skin: Skin is warm and dry.   Psychiatric: Mood, memory, affect and judgment normal.     Assessment & Plan     Diagnosis:  Epigastric pain    Anticipated Surgical Procedure:  EGD    The risks, benefits, and alternatives of this procedure have been discussed with the patient or the responsible party- the patient understands and agrees to proceed.

## 2022-11-07 ENCOUNTER — HOSPITAL ENCOUNTER (OUTPATIENT)
Dept: MAMMOGRAPHY | Facility: HOSPITAL | Age: 62
Discharge: HOME | End: 2022-11-07
Admitting: FAMILY MEDICINE

## 2022-11-07 ENCOUNTER — TELEPHONE (OUTPATIENT)
Dept: GASTROENTEROLOGY | Facility: CLINIC | Age: 62
End: 2022-11-07

## 2022-11-07 DIAGNOSIS — Z12.31 ENCOUNTER FOR SCREENING MAMMOGRAM FOR BREAST CANCER: ICD-10-CM

## 2022-11-07 LAB
CYTO UR: NORMAL
LAB AP CASE REPORT: NORMAL
LAB AP CLINICAL INFORMATION: NORMAL
PATH REPORT.FINAL DX SPEC: NORMAL
PATH REPORT.GROSS SPEC: NORMAL

## 2022-11-07 PROCEDURE — 77067 SCR MAMMO BI INCL CAD: CPT

## 2022-11-07 PROCEDURE — 77063 BREAST TOMOSYNTHESIS BI: CPT

## 2022-11-07 NOTE — TELEPHONE ENCOUNTER
----- Message from KEVON Lopez sent at 11/7/2022 10:25 AM EST -----  Stomach biopsies consistent with gastritis.  Continue PPI and avoid NSAIDs.  Follow-up with Thuy.

## 2022-11-07 NOTE — TELEPHONE ENCOUNTER
Spoke to pt and informed of Varun LUND result note and recommendations. F/U scheduled on EGD 4/4/2023. Pt verified understanding. Pt does not report any issues or concerns at this time.

## 2022-11-08 ENCOUNTER — OFFICE VISIT (OUTPATIENT)
Dept: FAMILY MEDICINE CLINIC | Age: 62
End: 2022-11-08

## 2022-11-08 ENCOUNTER — LAB (OUTPATIENT)
Dept: LAB | Facility: HOSPITAL | Age: 62
End: 2022-11-08

## 2022-11-08 VITALS
TEMPERATURE: 98.1 F | DIASTOLIC BLOOD PRESSURE: 78 MMHG | HEART RATE: 65 BPM | HEIGHT: 64 IN | BODY MASS INDEX: 23.05 KG/M2 | SYSTOLIC BLOOD PRESSURE: 114 MMHG | WEIGHT: 135 LBS

## 2022-11-08 DIAGNOSIS — Z23 ENCOUNTER FOR IMMUNIZATION: ICD-10-CM

## 2022-11-08 DIAGNOSIS — E78.2 MIXED HYPERLIPIDEMIA: ICD-10-CM

## 2022-11-08 DIAGNOSIS — E03.9 ACQUIRED HYPOTHYROIDISM: ICD-10-CM

## 2022-11-08 DIAGNOSIS — F41.8 OTHER SPECIFIED ANXIETY DISORDERS: ICD-10-CM

## 2022-11-08 DIAGNOSIS — M15.9 GENERALIZED OSTEOARTHRITIS: ICD-10-CM

## 2022-11-08 DIAGNOSIS — Z79.899 HIGH RISK MEDICATION USE: ICD-10-CM

## 2022-11-08 DIAGNOSIS — Z00.00 ANNUAL PHYSICAL EXAM: Primary | ICD-10-CM

## 2022-11-08 DIAGNOSIS — I10 ESSENTIAL HYPERTENSION: ICD-10-CM

## 2022-11-08 LAB
AMPHET+METHAMPHET UR QL: NEGATIVE
AMPHETAMINES UR QL: NEGATIVE
ANION GAP SERPL CALCULATED.3IONS-SCNC: 6.8 MMOL/L (ref 5–15)
BARBITURATES UR QL SCN: NEGATIVE
BENZODIAZ UR QL SCN: NEGATIVE
BUN SERPL-MCNC: 16 MG/DL (ref 8–23)
BUN/CREAT SERPL: 22.2 (ref 7–25)
BUPRENORPHINE SERPL-MCNC: NEGATIVE NG/ML
CALCIUM SPEC-SCNC: 8.9 MG/DL (ref 8.6–10.5)
CANNABINOIDS SERPL QL: NEGATIVE
CHLORIDE SERPL-SCNC: 106 MMOL/L (ref 98–107)
CO2 SERPL-SCNC: 29.2 MMOL/L (ref 22–29)
COCAINE UR QL: NEGATIVE
CREAT SERPL-MCNC: 0.72 MG/DL (ref 0.57–1)
EGFRCR SERPLBLD CKD-EPI 2021: 94.1 ML/MIN/1.73
EXPIRATION DATE: NORMAL
GLUCOSE SERPL-MCNC: 90 MG/DL (ref 65–99)
Lab: NORMAL
MDMA UR QL SCN: NEGATIVE
METHADONE UR QL SCN: NEGATIVE
OPIATES UR QL: NEGATIVE
OXYCODONE UR QL SCN: NEGATIVE
PCP UR QL SCN: NEGATIVE
POTASSIUM SERPL-SCNC: 4.4 MMOL/L (ref 3.5–5.2)
SODIUM SERPL-SCNC: 142 MMOL/L (ref 136–145)
TSH SERPL DL<=0.05 MIU/L-ACNC: 0.37 UIU/ML (ref 0.27–4.2)

## 2022-11-08 PROCEDURE — 1170F FXNL STATUS ASSESSED: CPT | Performed by: FAMILY MEDICINE

## 2022-11-08 PROCEDURE — 90686 IIV4 VACC NO PRSV 0.5 ML IM: CPT | Performed by: FAMILY MEDICINE

## 2022-11-08 PROCEDURE — 80305 DRUG TEST PRSMV DIR OPT OBS: CPT | Performed by: FAMILY MEDICINE

## 2022-11-08 PROCEDURE — 99214 OFFICE O/P EST MOD 30 MIN: CPT | Performed by: FAMILY MEDICINE

## 2022-11-08 PROCEDURE — 36415 COLL VENOUS BLD VENIPUNCTURE: CPT

## 2022-11-08 PROCEDURE — 80048 BASIC METABOLIC PNL TOTAL CA: CPT

## 2022-11-08 PROCEDURE — G0008 ADMIN INFLUENZA VIRUS VAC: HCPCS | Performed by: FAMILY MEDICINE

## 2022-11-08 PROCEDURE — 1159F MED LIST DOCD IN RCRD: CPT | Performed by: FAMILY MEDICINE

## 2022-11-08 PROCEDURE — 84443 ASSAY THYROID STIM HORMONE: CPT

## 2022-11-08 PROCEDURE — G0439 PPPS, SUBSEQ VISIT: HCPCS | Performed by: FAMILY MEDICINE

## 2022-11-08 RX ORDER — DULOXETIN HYDROCHLORIDE 60 MG/1
60 CAPSULE, DELAYED RELEASE ORAL DAILY
Qty: 90 CAPSULE | Refills: 1 | Status: SHIPPED | OUTPATIENT
Start: 2022-11-08

## 2022-11-08 RX ORDER — BUSPIRONE HYDROCHLORIDE 5 MG/1
5 TABLET ORAL 2 TIMES DAILY
Qty: 180 TABLET | Refills: 1 | Status: SHIPPED | OUTPATIENT
Start: 2022-11-08

## 2022-11-08 NOTE — ASSESSMENT & PLAN NOTE
Stable on current regimen.  Symptoms are well controlled.  No adverse effects. She does require ongoing use of this controlled substance to function.  Tox screen was due today.  Prior tox screen appropriate.  HAYLEY was run today.  Refills were not needed today.  RTC 3 months.

## 2022-11-08 NOTE — ASSESSMENT & PLAN NOTE
She is UTD on colonoscopy, last done 9/2020 and this showed diverticulosis as well as a rectal mass.  She had that removed by a colorectal surgeon, dxed as HSIL lesion.  She is following with Colorectal Surg.  She goes back to see them in December.  Pap smear is no longer indicated by history; s/p hysterectomy.  She is UTD on mammogram, last done 11/2022 and this was normal.  She is UTD on DEXA, last done 2/2021 and this showed osteopenia.  She is on Prolia.  She is UTD on COVID (due for bivalent booster; scheduled for 11/17 at [facility]), Tdap (5/2016).  She is due for Shingrix and flu.  Flu shot given today.  Shingrix can be done at the pharmacy.  She is due for routine labs including BMP and TSH; ordered.

## 2022-11-08 NOTE — ASSESSMENT & PLAN NOTE
Anxiety has still been incompletely controlled.  She has been taking the buspirone 5 mg once daily  In addition to the duloxetine 60 mg daily.  I am going to have her increase the buspirone to 5 mg twice daily and see if that helps control the anxiety better.

## 2022-11-08 NOTE — PROGRESS NOTES
"The ABCs of the Annual Wellness Visit  Subsequent Medicare Wellness Visit    Chief Complaint   Patient presents with   • Medicare Wellness-subsequent      Subjective    History of Present Illness:  Addie Yates is a 62 y.o. female who presents for a Subsequent Medicare Wellness Visit.    She is UTD on colonoscopy, last done 9/2020 and this showed diverticulosis as well as a rectal mass.  She had that removed by a colorectal surgeon, dxed as HSIL lesion.  She is following with Colorectal Surg.  She goes back to see them in December.  Pap smear is no longer indicated by history; s/p hysterectomy.  She is UTD on mammogram, last done 11/2022 and this was normal.  She is UTD on DEXA, last done 2/2021 and this showed osteopenia.  She is on Prolia.  She is UTD on COVID (due for bivalent booster; scheduled for 11/17 at [facility]), Tdap (5/2016).  She is due for Shingrix and flu.  She is due for routine labs including BMP and TSH.    She had L carpal tunnel release in August.  She goes back next week for post-op check.  The numbness is coming back.    She is on tramadol and duloxetine for chronic neck and back pain due to generalized osteoarthritis and scoliosis.  She was on gabapentin but didn't feel it was making a difference, so she stopped it.  Has not noticed any change since then.  She denies adverse effects.  She has followed with Dr. Simon at [facility]  for injections but those unfortunately did not help much.      She is on duloxetine and buspirone for anxiety.  She has been taking only one of the buspirone daily and feels she could benefit from a second dose.  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  She denies panic attacks but reports a steady, baseline level of anxiety.  \"I'll be shaking sometimes.\"      She is on lisinopril, atorvastatin, and aspirin for CAD s/p stenting in 11/2015.  She follows with Cardiology Wayne Daniels.       She is on levothyroxine for hypothyroidism.  No " cold/heat intolerance, changes in skin or hair.     She is on omeprazole for GERD and esophageal spasm.  Most recent EGD by Dr. Leroy done last Friday showed hiatal hernia and gastritis.  Gastric emptying study has shown gastroparesis.  She is on Motegrity, docusate, and lactulose for constipation.  The Motegrity has been helpful.  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  Continues to follow with GI.      She is on Prolia for osteoporosis.  Her DEXA is up-to-date, last done 2/2021 and this showed osteopenia.  Hips are improved.    The following portions of the patient's history were reviewed and   updated as appropriate: allergies, current medications, past family history, past medical history, past social history, past surgical history and problem list.    Compared to one year ago, the patient feels her physical   health is worse.    Compared to one year ago, the patient feels her mental   health is the same.    Recent Hospitalizations:  She was not admitted to the hospital during the last year.       Current Medical Providers:  Patient Care Team:  Jammie Phillips MD as PCP - General (Family Medicine)  Wayne Daniels APRN as Nurse Practitioner (Cardiology)  Gorge Reed DO (Orthopedic Surgery)  Ivy Simon MD (Pain Medicine)  Loren Caldwell APRN as Nurse Practitioner (Gastroenterology)    Outpatient Medications Prior to Visit   Medication Sig Dispense Refill   • aspirin 81 MG EC tablet Take 1 tablet by mouth Every Night.     • atorvastatin (LIPITOR) 80 MG tablet Take 1 tablet by mouth Daily.     • Calcium 500-125 MG-UNIT tablet Take 1 tablet by mouth Daily.     • denosumab (Prolia) 60 MG/ML solution prefilled syringe syringe Inject 1 mL under the skin into the appropriate area as directed Every 6 (Six) Months for 180 days. 1 mL 0   • docusate sodium (COLACE) 100 MG capsule Take 100 mg by mouth 2 (Two) Times a Day.     •  levothyroxine (SYNTHROID, LEVOTHROID) 100 MCG tablet Take 1 tablet by mouth Daily. 30 tablet 5   • lisinopril (PRINIVIL,ZESTRIL) 5 MG tablet Take 1 tablet by mouth Every Night.     • omeprazole (priLOSEC) 20 MG capsule Take 1 capsule by mouth Daily. 90 capsule 1   • Prucalopride Succinate (Motegrity) 2 MG tablet Take 1 tablet by mouth Daily. 90 tablet 1   • simethicone (MYLICON,GAS-X) 125 MG capsule capsule Take 1 capsule by mouth As Needed.     • traMADol (ULTRAM) 50 MG tablet TAKE ONE TABLET BY MOUTH TWICE DAILY AS NEEDED 60 tablet 2   • busPIRone (BUSPAR) 5 MG tablet Take 1 tablet by mouth 2 (Two) Times a Day. 180 tablet 1   • DULoxetine (CYMBALTA) 60 MG capsule TAKE ONE CAPSULE BY MOUTH EVERY DAY 90 capsule 1   • methylcellulose, Laxative, (CITRUCEL) 500 MG tablet tablet Take 2 tablets by mouth Every 4 (Four) Hours As Needed.       No facility-administered medications prior to visit.       Opioid medication/s are on active medication list.  and I have evaluated her active treatment plan and pain score trends (see table).  There were no vitals filed for this visit.  I have reviewed the chart for potential of high risk medication and harmful drug interactions in the elderly.            Aspirin is on active medication list. Aspirin use is indicated based on review of current medical condition/s. Pros and cons of this therapy have been discussed today. Benefits of this medication outweigh potential harm.  Patient has been encouraged to continue taking this medication.  .      Patient Active Problem List   Diagnosis   • Coronary artery disease involving native coronary artery of native heart without angina pectoris   • Gastroesophageal reflux disease without esophagitis   • Glaucoma   • Essential hypertension   • Mixed hyperlipidemia   • Hiatal hernia   • Irritable bowel syndrome   • Acquired hypothyroidism   • Generalized osteoarthritis   • High risk medication use   • Esophageal spasm   • Other specified anxiety  "disorders   • Age-related osteoporosis without current pathological fracture   • Pain in both lower extremities   • Annual physical exam   • Epigastric pain     Advance Care Planning  Advance Directive is not on file.  ACP discussion was held with the patient during this visit. Patient does not have an advance directive, information provided.    Review of Systems   Constitutional: Positive for fatigue. Negative for chills and fever.   HENT: Negative for congestion, hearing loss and rhinorrhea.    Eyes: Negative for pain and visual disturbance.   Respiratory: Negative for cough and shortness of breath.    Cardiovascular: Negative for chest pain and palpitations.   Gastrointestinal: Negative for abdominal pain, constipation, diarrhea, nausea and vomiting.   Genitourinary: Negative for difficulty urinating and dysuria.   Musculoskeletal: Positive for arthralgias and back pain. Negative for myalgias.   Neurological: Negative for weakness and numbness.   Psychiatric/Behavioral: Negative for dysphoric mood and sleep disturbance. The patient is not nervous/anxious.         Objective    Vitals:    11/08/22 0926   BP: 114/78   BP Location: Right arm   Patient Position: Sitting   Pulse: 65   Temp: 98.1 °F (36.7 °C)   TempSrc: Oral   Weight: 61.2 kg (135 lb)   Height: 162.6 cm (64\")     Estimated body mass index is 23.17 kg/m² as calculated from the following:    Height as of this encounter: 162.6 cm (64\").    Weight as of this encounter: 61.2 kg (135 lb).    BMI is within normal parameters. No other follow-up for BMI required.      Does the patient have evidence of cognitive impairment? No    Physical Exam  Vitals reviewed.   Constitutional:       General: She is not in acute distress.     Appearance: Normal appearance. She is well-developed.   HENT:      Head: Normocephalic and atraumatic.      Right Ear: External ear normal.      Left Ear: External ear normal.      Mouth/Throat:      Mouth: Mucous membranes are moist. "   Eyes:      Extraocular Movements: Extraocular movements intact.      Conjunctiva/sclera: Conjunctivae normal.      Pupils: Pupils are equal, round, and reactive to light.   Cardiovascular:      Rate and Rhythm: Normal rate and regular rhythm.      Heart sounds: No murmur heard.  Pulmonary:      Effort: Pulmonary effort is normal.      Breath sounds: Normal breath sounds. No wheezing, rhonchi or rales.   Abdominal:      General: Bowel sounds are normal. There is no distension.      Palpations: Abdomen is soft.      Tenderness: There is no abdominal tenderness.   Musculoskeletal:         General: Normal range of motion.   Skin:     General: Skin is warm and dry.   Neurological:      Mental Status: She is alert and oriented to person, place, and time.      Deep Tendon Reflexes: Reflexes normal.   Psychiatric:         Mood and Affect: Mood and affect normal.         Behavior: Behavior normal.         Thought Content: Thought content normal.         Judgment: Judgment normal.                 HEALTH RISK ASSESSMENT    Smoking Status:  Social History     Tobacco Use   Smoking Status Former   • Types: Cigarettes   • Quit date: 2004   • Years since quittin.0   Smokeless Tobacco Never     Alcohol Consumption:  Social History     Substance and Sexual Activity   Alcohol Use Not Currently     Fall Risk Screen:    STEADI Fall Risk Assessment was completed, and patient is at LOW risk for falls.Assessment completed on:2022    Depression Screening:  PHQ-2/PHQ-9 Depression Screening 2022   Retired PHQ-9 Total Score -   Retired Total Score -   Little Interest or Pleasure in Doing Things 0-->not at all   Feeling Down, Depressed or Hopeless 0-->not at all   PHQ-9: Brief Depression Severity Measure Score 0       Health Habits and Functional and Cognitive Screening:  Functional & Cognitive Status 2022   Do you have difficulty preparing food and eating? No   Do you have difficulty bathing yourself, getting dressed  or grooming yourself? No   Do you have difficulty using the toilet? No   Do you have difficulty moving around from place to place? No   Do you have trouble with steps or getting out of a bed or a chair? No   Current Diet Well Balanced Diet   Dental Exam Not up to date   Eye Exam Up to date   Exercise (times per week) 7 times per week   Current Exercises Include Yard Work   Do you need help using the phone?  No   Are you deaf or do you have serious difficulty hearing?  No   Do you need help with transportation? No   Do you need help shopping? No   Do you need help preparing meals?  No   Do you need help with housework?  No   Do you need help with laundry? No   Do you need help taking your medications? No   Do you need help managing money? No   Do you ever drive or ride in a car without wearing a seat belt? No   Have you felt unusual stress, anger or loneliness in the last month? Yes   Who do you live with? Spouse   If you need help, do you have trouble finding someone available to you? No   Have you been bothered in the last four weeks by sexual problems? No   Do you have difficulty concentrating, remembering or making decisions? Yes       Age-appropriate Screening Schedule:  Refer to the list below for future screening recommendations based on patient's age, sex and/or medical conditions. Orders for these recommended tests are listed in the plan section. The patient has been provided with a written plan.    Health Maintenance   Topic Date Due   • ZOSTER VACCINE (1 of 2) Never done   • DXA SCAN  02/02/2023   • LIPID PANEL  08/10/2023   • MAMMOGRAM  11/07/2024   • TDAP/TD VACCINES (3 - Td or Tdap) 05/26/2026   • INFLUENZA VACCINE  Completed              Assessment & Plan   CMS Preventative Services Quick Reference  Risk Factors Identified During Encounter  Immunizations Discussed/Encouraged (specific Immunizations; Shingrix and COVID19  The above risks/problems have been discussed with the patient.  Follow up  actions/plans if indicated are seen below in the Assessment/Plan Section.  Pertinent information has been shared with the patient in the After Visit Summary.    Diagnoses and all orders for this visit:    1. Annual physical exam (Primary)  Assessment & Plan:  She is UTD on colonoscopy, last done 9/2020 and this showed diverticulosis as well as a rectal mass.  She had that removed by a colorectal surgeon, dxed as HSIL lesion.  She is following with Colorectal Surg.  She goes back to see them in December.  Pap smear is no longer indicated by history; s/p hysterectomy.  She is UTD on mammogram, last done 11/2022 and this was normal.  She is UTD on DEXA, last done 2/2021 and this showed osteopenia.  She is on Prolia.  She is UTD on COVID (due for bivalent booster; scheduled for 11/17 at [facility]), Tdap (5/2016).  She is due for Shingrix and flu.  Flu shot given today.  Shingrix can be done at the pharmacy.  She is due for routine labs including BMP and TSH; ordered.      2. Other specified anxiety disorders  Assessment & Plan:  Anxiety has still been incompletely controlled.  She has been taking the buspirone 5 mg once daily  In addition to the duloxetine 60 mg daily.  I am going to have her increase the buspirone to 5 mg twice daily and see if that helps control the anxiety better.    Orders:  -     busPIRone (BUSPAR) 5 MG tablet; Take 1 tablet by mouth 2 (Two) Times a Day.  Dispense: 180 tablet; Refill: 1  -     DULoxetine (CYMBALTA) 60 MG capsule; Take 1 capsule by mouth Daily.  Dispense: 90 capsule; Refill: 1    3. Generalized osteoarthritis  Assessment & Plan:  Stable on current regimen.  Symptoms are well controlled.  No adverse effects. She does require ongoing use of this controlled substance to function.  Tox screen was due today.  Prior tox screen appropriate.  HAYLEY was run today.  Refills were not needed today.  RTC 3 months.        4. High risk medication use  -     POC Urine Drug Screen Premier  Bio-[facility]    5. Essential hypertension  Assessment & Plan:  Stable on lisinopril 5 mg daily.  No refills needed.  Checking labs.      6. Mixed hyperlipidemia  Assessment & Plan:  Stable on atorvastatin 80 mg daily.  No refills needed.  Labs reviewed and up-to-date.      7. Acquired hypothyroidism  Assessment & Plan:  Last TSH came back suppressed.  We adjusted the levothyroxine downwards at that time to 100 mcg daily.  She is due for a recheck today.  Holding off on refills until we know how if it needs to be adjusted.    Orders:  -     Basic Metabolic Panel; Future  -     TSH; Future    8. Encounter for immunization  -     FluLaval/Fluzone >6 mos ([id number])      Follow Up:   Return in about 3 months (around 2/8/2023) for Recheck.     An After Visit Summary and PPPS were made available to the patient.

## 2022-11-08 NOTE — ASSESSMENT & PLAN NOTE
Last TSH came back suppressed.  We adjusted the levothyroxine downwards at that time to 100 mcg daily.  She is due for a recheck today.  Holding off on refills until we know how if it needs to be adjusted.

## 2022-12-05 DIAGNOSIS — M15.9 GENERALIZED OSTEOARTHRITIS: ICD-10-CM

## 2022-12-06 RX ORDER — TRAMADOL HYDROCHLORIDE 50 MG/1
50 TABLET ORAL 2 TIMES DAILY PRN
Qty: 60 TABLET | Refills: 2 | Status: SHIPPED | OUTPATIENT
Start: 2022-12-06 | End: 2023-03-09

## 2022-12-20 DIAGNOSIS — K59.04 CHRONIC IDIOPATHIC CONSTIPATION: ICD-10-CM

## 2022-12-20 RX ORDER — PRUCALOPRIDE 2 MG/1
TABLET, FILM COATED ORAL
Qty: 90 TABLET | Refills: 1 | Status: SHIPPED | OUTPATIENT
Start: 2022-12-20 | End: 2023-04-04 | Stop reason: SDUPTHER

## 2023-01-06 ENCOUNTER — TELEPHONE (OUTPATIENT)
Dept: FAMILY MEDICINE CLINIC | Age: 63
End: 2023-01-06
Payer: MEDICARE

## 2023-01-06 DIAGNOSIS — M81.0 AGE-RELATED OSTEOPOROSIS WITHOUT CURRENT PATHOLOGICAL FRACTURE: ICD-10-CM

## 2023-01-06 NOTE — TELEPHONE ENCOUNTER
Pt due for Prolia 1/14/23    Last Dexa -1.3  DEXA Bone Density Axial (02/02/2021 10:16)    Last CMP- Calcium 8.9  Basic Metabolic Panel (11/08/2022 10:27)    Last office Visit   Office Visit with Jammie Phillips MD (11/08/2022)

## 2023-01-19 NOTE — PROGRESS NOTES
Addie Yates 1960     Office/Outpatient Visit    Visit Date: Mon, Apr 1, 2019 01:58 pm    Provider: Jammie Phillips MD (Assistant: Carmencita Ghosh MA)    Location: [facility]        Electronically signed by Jammie Phillips MD on  04/01/2019 04:56:00 PM                             SUBJECTIVE:        CC:     Ms. Yates is a 59 year old White female.  This is a follow-up visit.  chronics. Patient also brought in with her today labs from Dr Zhang office;         HPI: Addie is here to f/u on chronic issues.        She is on tramadol and gabapentin for chronic neck and back pain caused by generalized OA and scoliosis.  She is on gabapentin 300 mg daily and then has the tramadol which she uses anywhere from 1-3 times per week for breakthrough pain.  Pain is well controlled.  No adverse effects.          She is on lisinopril, atorvastatin, and ASA/Plavix for CAD s/p stenting in 11/2015.  She follows with Dr. Ernandez.  No CP, palpitations, SOB.        She is on levothyroxine for hypothyroidism.  No heat/cold intolerance, no changes in hair or skin.        She is on ranitidine and pantoprazole both for GERD and esophageal spasm.  She follows with Dr. Leroy.  Her last EGD showed hiatal hernia and gastritis.  Gastric emptying study has shown gastroparesis.  She does have some chronic constipation as well but is no longer on meds for this (has used Linzess and Amitiza in the past).        She is on Prolia for osteoporosis.  Last DEXA done 1/2019 showed osteopenia.         Has stopped Wellbutrin originally started by Dr. Bryant for sex drive and anxiety.  No change in sx since stopping.     ROS:     CONSTITUTIONAL:  Negative for fatigue and fever.      EYES:  Negative for blurred vision.      E/N/T:  Negative for diminished hearing and nasal congestion.      CARDIOVASCULAR:  Negative for chest pain and palpitations.      RESPIRATORY:  Negative for recent cough and dyspnea.      GASTROINTESTINAL:   Positive for constipation.   Negative for abdominal pain, diarrhea, nausea or vomiting.      MUSCULOSKELETAL:  Positive for arthralgias and back pain.   Negative for myalgias.      PSYCHIATRIC:  Positive for anxiety, feelings of stress, anhedonia and sleep disturbance.   Negative for crying spells or depression.          PMH/FMH/SH:     Last Reviewed on 4/01/2019 02:28 PM by Jammie Phillips    Past Medical History: R Thoracic Outlet Syndrome    R Lateral and Medial Epicondylitis     R Carpal Tunnel    Osteoarthritis    Adenomatous Colonic Polyp    Osteopenia    Constipation, severe and chronic    DDD L-spine    DDD C-spine    Hypothyroidism    Toxic Goiter    Hiatal Hernia    Glaucoma OU    Chronic Pain, Base of Skull/Uppermost Neck     Hemorrhoids                        PAST MEDICAL HISTORY             CURRENT MEDICAL PROVIDERS:    Dentist: Jaclyn    Dermatologist: Romie    Gastroenterologist: Paulette    Obstetrician/Gynecologist: Manny    Ophthalmologist: [facility]         PAST MEDICAL HISTORY                 ADVANCED DIRECTIVES: None         PREVENTIVE HEALTH MAINTENANCE             BONE DENSITY: was last done 1/16/2019 osteopenia     COLORECTAL CANCER SCREENING: Up to date (colonoscopy q10y; sigmoidoscopy q5y; Cologuard q3y) was last done 3/26/2015, Results are in chart; colonoscopy with normal results     Hepatitis C Medicare Screening: was last done 8/2017     MAMMOGRAM: Done within last 2 years and results in are chart was last done 06/2018 with normal results     PAP SMEAR: Hysterectomy 2004         PAST MEDICAL HISTORY                 ADVANCED DIRECTIVES: None         Surgical History: Bi-tubal Ligation    R Thyroid Bx    Colonoscopy with Adenoma '07, '10, '15    EGD '08, '16    Laser Rx OU     Catheterization/Stent times one 11-15            Cholecystectomy: laparoscopic; 2017;     Hysterectomy: 2004;         Family History: NO Colon, Breast, Ovarian CA    Leukemia    Kidney Cancer    Diabetes          Social History:         Marital Status:      Children: 1 child         Tobacco/Alcohol/Supplements:     Last Reviewed on 4/01/2019 02:28 PM by Jammie Phillips    Tobacco: She has a past history of cigarette smoking; quit date:  11/2004.          Substance Abuse History:     Last Reviewed on 4/01/2019 02:28 PM by Jammie Phillips        Mental Health History:     Last Reviewed on 4/01/2019 02:28 PM by Jammie Phillips        Communicable Diseases (eg STDs):     Last Reviewed on 4/01/2019 02:28 PM by Jammie Phillips            Current Problems:     Last Reviewed on 1/09/2019 12:58 PM by Jammie Phillips    Anxiety     Osteoporosis, other     Artificial lens replacement     Scoliosis     Gastroparesis     GERD     Esophageal spasm     Hiatal hernia     Use of high risk medications     Hemorrhoids, NOS     Unspecified glaucoma     Hypothyroidism     Osteopenia     Colon polyps     Osteoarthritis, NOS     CAD         Immunizations:     None        Allergies:     Last Reviewed on 4/01/2019 02:03 PM by Carmencita Ghosh      No Known Drug Allergies.         Current Medications:     Last Reviewed on 4/01/2019 02:04 PM by Carmencita Ghosh    Gabapentin 300mg Capsules 1 capsule daily     Synthroid 0.1mg Tablet 1 tab daily     Polyethylene Glycol 3350  Powder for Oral Solution 1 capful (17g) daily, mixed in 4-8oz of liquid daily     Tramadol 50mg Tablet 1 po BID PRN     Atorvastatin Calcium 80mg Tablet 1 tab hs     Aspirin (ASA) 81mg Tablets, Enteric Coated 1 tab daily     Lisinopril 5mg Tablet 1 tab daily     Prolia 60mg/1ml Injection give SQ q 6months     Gas-X     Colace 100mg Capsules Take 2 capsule BID for constipation     OTC Calcium 1200mg/Vitamin D3 1000IU daily         OBJECTIVE:        Vitals:         Current: 4/1/2019 2:05:12 PM    Ht:  5 ft, 3.5 in;  Wt: 138 lbs;  BMI: 24.1    T: 97.5 F (oral);  BP: 116/76 mm Hg (left arm, sitting);  P: 58 bpm (left arm (BP Cuff), sitting);  sCr: 1.05 mg/dL;  GFR: 54.32         Exams:     PHYSICAL EXAM:     GENERAL: vital signs recorded - well developed, well nourished;  well groomed;  no apparent distress;     EYES: extraocular movements intact; conjunctiva and cornea are normal; PERRLA;     E/N/T: OROPHARYNX: cold sore L upper lateral lip;     RESPIRATORY: normal respiratory rate and pattern with no distress; normal breath sounds with no rales, rhonchi, wheezes or rubs;     CARDIOVASCULAR: normal rate; rhythm is regular;  no systolic murmur; no edema;     GASTROINTESTINAL: nontender; normal bowel sounds;     MUSCULOSKELETAL: normal gait; normal overall tone     PSYCHIATRIC: appropriate affect and demeanor; normal psychomotor function; normal speech pattern;         Lab/Test Results:             Urine temperature:  confirmed (04/01/2019),     All urine drug screen levels confirmed negative:  yes (04/01/2019),     Date and time of last pill:  Gabapentin 03/31/2019 @ 2100, Tramadol 04/01/2019 @1200/AS (04/01/2019),     Performed by:  pr (04/01/2019),     Collection Time:  14:55 (04/01/2019),             ASSESSMENT           715.09   M15.0  Generalized osteoarthritis              DDx:     V58.69   Z79.891  Use of high risk medications              DDx:     414.01   I25.10  CAD              DDx:     244.9   E03.9  Hypothyroidism              DDx:     530.81   K21.9  GERD              DDx:     054.9   B00.9  cold sores              DDx:     300.02   F41.9  Anxiety              DDx:         ORDERS:         Meds Prescribed:       Acyclovir 400mg Tablet Take 1 tablet three times daily prn  #15 (Fifteen) tablet(s) Refills: 0       Lexapro (Escitalopram Oxalate) 5mg Tablet 1 tab po QD  #30 (Thirty) tablet(s) Refills: 2         Radiology/Test Orders:       3014F  Screening mammography results documented and reviewed (PV)1  (In-House)         3017F  Colorectal CA screen results documented and reviewed (PV)  (In-House)           Lab Orders:       APPTO  Appointment need  (In-House)         82330   Drug test prsmv qual dir optical obs per day  (In-House)         33562  HTNLP - [facility] CMP AND LIPID: [id number], 47493  (Send-Out)         75623  TSH - [facility] TSH  (Send-Out)         85893  NEURU - [facility] GAPAPENTIN CONFIRMATION  (Send-Out)         90289  TRAMU - [facility] 47764 TRAMADOL AND METABOLITES  (Send-Out)                   PLAN:          Generalized osteoarthritis Stable on tramadol and gabapentin.  Pain is well controlled.  No adverse effects.  She does require ongoing use of this controlled substance to function.  Tox screen and Ugo run today.  No refills needed today.  RTC 3 months.     MIPS Vaccines Flu and Pneumonia updated in Shot record     MAMMOGRAM: Done within last 2 years and results in are chart     COLORECTAL CANCER SCREENING: Results are in chart     FOLLOW-UP: Schedule a follow-up visit in 3 months..  f/u generalized OA           Orders:       3014F  Screening mammography results documented and reviewed (PV)1  (In-House)         3017F  Colorectal CA screen results documented and reviewed (PV)  (In-House)         APPTO  Appointment need  (In-House)            Use of high risk medications     LABORATORY:  Labs ordered to be performed today include Drug Screen Urine [facility] Confirmation GAPABENTIN TRAMADOL AND METABOLITES.  Controlled substance documentation: Ugo reviewed; drug screen performed and appropriate; consent is reviewed and signed and on the chart.  She is aware of risk of addiction on this medication, understands that she will need to follow up for a review every 3 months and her medications will be adjusted or decreased as deemed appropriate at each visit.  No history of drug or alcohol abuse.  No concerns about diversion or abuse. She denies side effects related to the medication.  She is aware that she may be called in for pill counts.  The dosing of this medication will be reviewed on a regular basis and reduced if possible..  Ongoing use of a controlled substance is necessary for this patient  to have a normal quality of life           Orders:       45056  Drug test prsmv qual dir optical obs per day  (In-House)         65614  NEURU - [facility] GAPAPENTIN CONFIRMATION  (Send-Out)         40001  TRAMU - [facility] 10693 TRAMADOL AND METABOLITES  (Send-Out)            CAD Stable.  No refills needed.  Checking labs.     LABORATORY:  Labs ordered to be performed today include HTN/Lipid Panel: CMP, Lipid.            Orders:       64476  HTNLP - [facility] CMP AND LIPID: [id number], 78188  (Send-Out)            Hypothyroidism Stable.  Checking labs.  Will need refills once these come back.     LABORATORY:  Labs ordered to be performed today include TSH.            Orders:       06348  TSH - [facility] TSH  (Send-Out)            GERD Stable.  No refills needed.          cold sores Cold sore L upper lip.  Sending in acyclovir.           Prescriptions:       Acyclovir 400mg Tablet Take 1 tablet three times daily prn  #15 (Fifteen) tablet(s) Refills: 0          Anxiety She has not noticed a difference since going off the Wellbutrin.  Still feeling very anxious.  Interested in starting an SSRI; will try some Lexapro.  Reassess in 3 months.           Prescriptions:       Lexapro (Escitalopram Oxalate) 5mg Tablet 1 tab po QD  #30 (Thirty) tablet(s) Refills: 2             Patient Recommendations:        For  Generalized osteoarthritis:     Schedule a follow-up visit in 3 months.                APPOINTMENT INFORMATION:        Monday Tuesday Wednesday Thursday Friday Saturday Sunday            Time:___________________AM  PM   Date:_____________________             CHARGE CAPTURE           **Please note: ICD descriptions below are intended for billing purposes only and may not represent clinical diagnoses**        Primary Diagnosis:         715.09 Generalized osteoarthritis            M15.0    Primary generalized (osteo)arthritis              Orders:          16542   Office/outpatient visit; established patient, level 4  (In-House)              3014F   Screening mammography results documented and reviewed (PV)1  (In-House)             3017F   Colorectal CA screen results documented and reviewed (PV)  (In-House)             APPTO   Appointment need  (In-House)           V58.69 Use of high risk medications            Z79.891    Long term (current) use of opiate analgesic              Orders:          10755   Drug test prsmv qual dir optical obs per day  (In-House)           414.01 CAD            I25.10    Atherosclerotic heart disease of native coronary artery without angina pectoris    244.9 Hypothyroidism            E03.9    Hypothyroidism, unspecified    530.81 GERD            K21.9    Gastro-esophageal reflux disease without esophagitis    054.9 cold sores            B00.9    Herpesviral infection, unspecified    300.02 Anxiety            F41.9    Anxiety disorder, unspecified            Home

## 2023-01-26 RX ORDER — DENOSUMAB 60 MG/ML
60 INJECTION SUBCUTANEOUS
Qty: 1 ML | Refills: 0 | Status: SHIPPED | OUTPATIENT
Start: 2023-01-26 | End: 2023-07-25

## 2023-02-08 NOTE — TELEPHONE ENCOUNTER
Estimated delivery: 2/9/2023. Spoke to Zabrina with [facility]. Order #: [id number].

## 2023-02-10 ENCOUNTER — OFFICE VISIT (OUTPATIENT)
Dept: FAMILY MEDICINE CLINIC | Age: 63
End: 2023-02-10
Payer: MEDICARE

## 2023-02-10 VITALS
HEART RATE: 68 BPM | WEIGHT: 137 LBS | HEIGHT: 64 IN | DIASTOLIC BLOOD PRESSURE: 90 MMHG | SYSTOLIC BLOOD PRESSURE: 146 MMHG | BODY MASS INDEX: 23.39 KG/M2

## 2023-02-10 DIAGNOSIS — M15.9 GENERALIZED OSTEOARTHRITIS: Primary | ICD-10-CM

## 2023-02-10 DIAGNOSIS — K44.9 HIATAL HERNIA: ICD-10-CM

## 2023-02-10 DIAGNOSIS — I10 ESSENTIAL HYPERTENSION: ICD-10-CM

## 2023-02-10 DIAGNOSIS — K21.9 GASTROESOPHAGEAL REFLUX DISEASE WITHOUT ESOPHAGITIS: ICD-10-CM

## 2023-02-10 DIAGNOSIS — E78.2 MIXED HYPERLIPIDEMIA: ICD-10-CM

## 2023-02-10 DIAGNOSIS — E03.9 ACQUIRED HYPOTHYROIDISM: ICD-10-CM

## 2023-02-10 DIAGNOSIS — F41.8 OTHER SPECIFIED ANXIETY DISORDERS: ICD-10-CM

## 2023-02-10 DIAGNOSIS — Z78.0 MENOPAUSE: ICD-10-CM

## 2023-02-10 DIAGNOSIS — M81.0 AGE-RELATED OSTEOPOROSIS WITHOUT CURRENT PATHOLOGICAL FRACTURE: ICD-10-CM

## 2023-02-10 DIAGNOSIS — Z79.899 HIGH RISK MEDICATION USE: ICD-10-CM

## 2023-02-10 DIAGNOSIS — I25.10 CORONARY ARTERY DISEASE INVOLVING NATIVE CORONARY ARTERY OF NATIVE HEART WITHOUT ANGINA PECTORIS: ICD-10-CM

## 2023-02-10 PROBLEM — Z00.00 ANNUAL PHYSICAL EXAM: Status: RESOLVED | Noted: 2021-11-03 | Resolved: 2023-02-10

## 2023-02-10 LAB
AMPHET+METHAMPHET UR QL: NEGATIVE
AMPHETAMINES UR QL: NEGATIVE
BARBITURATES UR QL SCN: NEGATIVE
BENZODIAZ UR QL SCN: NEGATIVE
BUPRENORPHINE SERPL-MCNC: NEGATIVE NG/ML
CANNABINOIDS SERPL QL: NEGATIVE
COCAINE UR QL: NEGATIVE
EXPIRATION DATE: NORMAL
Lab: NORMAL
MDMA UR QL SCN: NEGATIVE
METHADONE UR QL SCN: NEGATIVE
OPIATES UR QL: NEGATIVE
OXYCODONE UR QL SCN: NEGATIVE
PCP UR QL SCN: NEGATIVE
THC INTERNAL CONTROL: NORMAL

## 2023-02-10 PROCEDURE — 96372 THER/PROPH/DIAG INJ SC/IM: CPT | Performed by: FAMILY MEDICINE

## 2023-02-10 PROCEDURE — 80305 DRUG TEST PRSMV DIR OPT OBS: CPT | Performed by: FAMILY MEDICINE

## 2023-02-10 PROCEDURE — 99214 OFFICE O/P EST MOD 30 MIN: CPT | Performed by: FAMILY MEDICINE

## 2023-02-10 RX ORDER — OMEPRAZOLE 40 MG/1
40 CAPSULE, DELAYED RELEASE ORAL DAILY
Qty: 30 CAPSULE | Refills: 2 | Status: SHIPPED | OUTPATIENT
Start: 2023-02-10

## 2023-02-10 RX ORDER — MELOXICAM 15 MG/1
1 TABLET ORAL DAILY
COMMUNITY
Start: 2023-02-08

## 2023-02-10 NOTE — ASSESSMENT & PLAN NOTE
Stable on aspirin 81 mg daily, atorvastatin 80 mg daily, lisinopril 5 mg daily.  No refills needed.  Labs reviewed and up-to-date.  Following with Cardiology

## 2023-02-10 NOTE — ASSESSMENT & PLAN NOTE
Blood pressure is generally speaking running at goal.  She is only on lisinopril 5 mg daily as her blood pressure has not tolerated higher doses in the past.  She is running a little high today when she first arrived in clinic but it appears better on recheck.  No refills needed today.  Labs are reviewed and up-to-date.

## 2023-02-10 NOTE — ASSESSMENT & PLAN NOTE
Stable on duloxetine 60 mg daily and buspirone 5 mg twice daily.  Doing better at the twice daily dosing of buspirone.  No refills needed.  Continue to monitor.

## 2023-02-10 NOTE — PROGRESS NOTES
"Chief Complaint  Osteoarthritis (3 month follow up)    Subjective          Addie Yates presents to [facility] today for routine follow-up on chronic issues.     She is on tramadol and duloxetine for chronic neck and back pain d/t diffuse OA and scoliosis.  Gabapentin was ineffective.  No problems with adverse effects.  She has followed with Dr. Simon at [facility]  for injections but those unfortunately did not help much.  She is no longer being seen there.  She had her carpal tunnel surgery with postop follow-up scheduled for Monday.  Recovering well.      She is on duloxetine and buspirone for anxiety.  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  Her boyfriend was recently diagnosed with COPD and that has also been a lot.  However, she overall feels better than she did last visit.  Increasing the buspirone to twice daily has helped.  She is no longer having the shaking spells.  She denies panic attacks.      She is on lisinopril, atorvastatin, and ASA for CAD s/p stenting in 11/2015.  Following with Cardiology Wayne Daniels.     She is on levothyroxine for hypothyroidism.  Denies heat or cold intolerance or changes in skin or hair.     She is on omeprazole for GERD and esophageal spasm.  Most recent EGD by Dr. Leroy showed hiatal hernia and gastritis.  Gastric emptying study has shown gastroparesis.  She is on Motegrity, docusate, and lactulose for constipation.  She has been diagnosed with high grade dysplasia of the rectum but Colorectal has already signed off on that.  Continues to follow with GI.  She has continued \"gnawing\" epigastric pain, comes intermittently and increases in intensity once it starts.  She tries to treat with Gas-Ex.  F/u scheduled with GI on 4/4/23.      She is on Prolia for osteoporosis.  Her DEXA is due, last done 2/2021 and this showed osteopenia.      The following portions of the patient's history were reviewed and   updated " "as appropriate: allergies, current medications, past family history, past medical history, past social history, past surgical history and problem list.      Current Outpatient Medications:   •  aspirin 81 MG EC tablet, Take 1 tablet by mouth Every Night., Disp: , Rfl:   •  atorvastatin (LIPITOR) 80 MG tablet, Take 1 tablet by mouth Daily., Disp: , Rfl:   •  busPIRone (BUSPAR) 5 MG tablet, Take 1 tablet by mouth 2 (Two) Times a Day., Disp: 180 tablet, Rfl: 1  •  Calcium 500-125 MG-UNIT tablet, Take 1 tablet by mouth Daily., Disp: , Rfl:   •  denosumab (Prolia) 60 MG/ML solution prefilled syringe syringe, Inject 1 mL under the skin into the appropriate area as directed Every 6 (Six) Months for 180 days. Indications: Postmenopausal Osteoporosis, Disp: 1 mL, Rfl: 0  •  docusate sodium (COLACE) 100 MG capsule, Take 100 mg by mouth 2 (Two) Times a Day., Disp: , Rfl:   •  DULoxetine (CYMBALTA) 60 MG capsule, Take 1 capsule by mouth Daily., Disp: 90 capsule, Rfl: 1  •  levothyroxine (SYNTHROID, LEVOTHROID) 100 MCG tablet, Take 1 tablet by mouth Daily., Disp: 30 tablet, Rfl: 5  •  lisinopril (PRINIVIL,ZESTRIL) 5 MG tablet, Take 1 tablet by mouth Every Night., Disp: , Rfl:   •  meloxicam (MOBIC) 15 MG tablet, Take 1 tablet by mouth Daily., Disp: , Rfl:   •  Motegrity 2 MG tablet, TAKE ONE TABLET BY MOUTH EVERY DAY, Disp: 90 tablet, Rfl: 1  •  omeprazole (priLOSEC) 40 MG capsule, Take 1 capsule by mouth Daily., Disp: 30 capsule, Rfl: 2  •  simethicone (MYLICON,GAS-X) 125 MG capsule capsule, Take 1 capsule by mouth As Needed., Disp: , Rfl:   •  traMADol (ULTRAM) 50 MG tablet, Take 1 tablet by mouth 2 (Two) Times a Day As Needed for Moderate Pain., Disp: 60 tablet, Rfl: 2    Allergies:  Patient has no known allergies.      Objective   Vital Signs:   Vitals:    02/10/23 0949   BP: 142/84   BP Location: Left arm   Patient Position: Sitting   Pulse: 69   Weight: 62.1 kg (137 lb)   Height: 162.6 cm (64.02\")       Physical " Exam  Vitals reviewed.   Constitutional:       General: She is not in acute distress.     Appearance: Normal appearance. She is well-developed.   HENT:      Head: Normocephalic and atraumatic.      Right Ear: External ear normal.      Left Ear: External ear normal.   Eyes:      Extraocular Movements: Extraocular movements intact.      Conjunctiva/sclera: Conjunctivae normal.      Pupils: Pupils are equal, round, and reactive to light.   Cardiovascular:      Rate and Rhythm: Normal rate and regular rhythm.      Heart sounds: No murmur heard.  Pulmonary:      Effort: Pulmonary effort is normal.      Breath sounds: Normal breath sounds. No wheezing, rhonchi or rales.   Abdominal:      General: Bowel sounds are normal. There is no distension.      Palpations: Abdomen is soft.      Tenderness: There is abdominal tenderness (epigastrium).   Musculoskeletal:         General: Tenderness (bilateral cervical and lumbar paraspinals) present. Normal range of motion.   Neurological:      Mental Status: She is alert.   Psychiatric:         Mood and Affect: Affect normal.          Lab Results   Component Value Date    GLUCOSE 90 11/08/2022    BUN 16 11/08/2022    CREATININE 0.72 11/08/2022    EGFRIFNONA 78 12/29/2021    EGFRIFAFRI 76 02/28/2018    BCR 22.2 11/08/2022    K 4.4 11/08/2022    CO2 29.2 (H) 11/08/2022    CALCIUM 8.9 11/08/2022    ALBUMIN 4.20 08/10/2022    LABIL2 2.0 04/15/2021    AST 26 08/10/2022    ALT 23 08/10/2022       Lab Results   Component Value Date    CHOL 142 08/10/2022    CHLPL 135 04/15/2021    TRIG 44 08/10/2022    HDL 69 (H) 08/10/2022    LDL 63 08/10/2022            Assessment and Plan    Diagnoses and all orders for this visit:    1. Generalized osteoarthritis (Primary)  Assessment & Plan:  Stable on current regimen.  Symptoms are well controlled.  No adverse effects. She does require ongoing use of this controlled substance to function.  Tox screen was due today.  Prior tox screen appropriate.   HYALEY was run today.  Refills were not needed today.  RTC 3 months.        2. High risk medication use  -     POC Urine Drug Screen [facility]    3. Essential hypertension  Assessment & Plan:  Blood pressure is generally speaking running at goal.  She is only on lisinopril 5 mg daily as her blood pressure has not tolerated higher doses in the past.  She is running a little high today when she first arrived in clinic but it appears better on recheck.  No refills needed today.  Labs are reviewed and up-to-date.      4. Mixed hyperlipidemia  Assessment & Plan:  Stable on atorvastatin 80 mg daily.  No refills needed.  Labs reviewed and up-to-date.      5. Acquired hypothyroidism  Assessment & Plan:  Stable on levothyroxine 100 mcg daily.  No refills needed.  Labs are reviewed and up-to-date.      6. Coronary artery disease involving native coronary artery of native heart without angina pectoris  Overview:  Follows with Wayne LUND, [facility], [place], [place]    Assessment & Plan:  Stable on aspirin 81 mg daily, atorvastatin 80 mg daily, lisinopril 5 mg daily.  No refills needed.  Labs reviewed and up-to-date.  Following with Cardiology      7. Other specified anxiety disorders  Assessment & Plan:  Stable on duloxetine 60 mg daily and buspirone 5 mg twice daily.  Doing better at the twice daily dosing of buspirone.  No refills needed.  Continue to monitor.      8. Gastroesophageal reflux disease without esophagitis  Assessment & Plan:  Stable on omeprazole 20 mg daily now for GERD and esophageal spasm.  Up-to-date on EGD by Dr. Mariaa LOMELI.  No refills needed.  Continue to monitor and wean PPI as able.    Orders:  -     omeprazole (priLOSEC) 40 MG capsule; Take 1 capsule by mouth Daily.  Dispense: 30 capsule; Refill: 2    9. Age-related osteoporosis without current pathological fracture  Overview:  On Prolia.  Last DEXA was done 2/2021 showing improved osteopenia.    Assessment & Plan:  Due for updated  DEXA, ordered.      10. Menopause  -     DEXA Bone Density Axial; Future    11. Hiatal hernia  -     omeprazole (priLOSEC) 40 MG capsule; Take 1 capsule by mouth Daily.  Dispense: 30 capsule; Refill: 2      Follow Up   Return in about 3 months (around 5/10/2023) for Recheck.  Patient was given instructions and counseling regarding her condition or for health maintenance advice. Please see specific information pulled into the AVS if appropriate.           02/10/2023

## 2023-02-10 NOTE — ASSESSMENT & PLAN NOTE
Stable on omeprazole 20 mg daily now for GERD and esophageal spasm.  Up-to-date on EGD by Dr. Mariaa LOMELI.  No refills needed.  Continue to monitor and wean PPI as able.

## 2023-02-22 ENCOUNTER — HOSPITAL ENCOUNTER (OUTPATIENT)
Dept: BONE DENSITY | Facility: HOSPITAL | Age: 63
Discharge: HOME | End: 2023-02-22
Admitting: FAMILY MEDICINE
Payer: MEDICARE

## 2023-02-22 DIAGNOSIS — Z78.0 MENOPAUSE: ICD-10-CM

## 2023-02-22 PROCEDURE — 77080 DXA BONE DENSITY AXIAL: CPT

## 2023-03-09 DIAGNOSIS — M15.9 GENERALIZED OSTEOARTHRITIS: ICD-10-CM

## 2023-03-09 RX ORDER — TRAMADOL HYDROCHLORIDE 50 MG/1
50 TABLET ORAL 2 TIMES DAILY PRN
Qty: 60 TABLET | Refills: 2 | Status: SHIPPED | OUTPATIENT
Start: 2023-03-09

## 2023-03-14 DIAGNOSIS — E03.9 ACQUIRED HYPOTHYROIDISM: ICD-10-CM

## 2023-03-14 RX ORDER — LEVOTHYROXINE SODIUM 0.1 MG/1
TABLET ORAL
Qty: 30 TABLET | Refills: 5 | Status: SHIPPED | OUTPATIENT
Start: 2023-03-14

## 2023-04-04 ENCOUNTER — OFFICE VISIT (OUTPATIENT)
Dept: GASTROENTEROLOGY | Facility: CLINIC | Age: 63
End: 2023-04-04
Payer: MEDICARE

## 2023-04-04 VITALS
DIASTOLIC BLOOD PRESSURE: 87 MMHG | SYSTOLIC BLOOD PRESSURE: 138 MMHG | HEIGHT: 64 IN | WEIGHT: 137 LBS | BODY MASS INDEX: 23.39 KG/M2 | HEART RATE: 70 BPM

## 2023-04-04 DIAGNOSIS — R10.13 EPIGASTRIC PAIN: Primary | ICD-10-CM

## 2023-04-04 DIAGNOSIS — K59.04 CHRONIC IDIOPATHIC CONSTIPATION: ICD-10-CM

## 2023-04-04 DIAGNOSIS — K29.50 CHRONIC GASTRITIS WITHOUT BLEEDING, UNSPECIFIED GASTRITIS TYPE: ICD-10-CM

## 2023-04-04 PROCEDURE — 3075F SYST BP GE 130 - 139MM HG: CPT | Performed by: NURSE PRACTITIONER

## 2023-04-04 PROCEDURE — 3079F DIAST BP 80-89 MM HG: CPT | Performed by: NURSE PRACTITIONER

## 2023-04-04 PROCEDURE — 99214 OFFICE O/P EST MOD 30 MIN: CPT | Performed by: NURSE PRACTITIONER

## 2023-04-04 PROCEDURE — 1159F MED LIST DOCD IN RCRD: CPT | Performed by: NURSE PRACTITIONER

## 2023-04-04 PROCEDURE — 1160F RVW MEDS BY RX/DR IN RCRD: CPT | Performed by: NURSE PRACTITIONER

## 2023-04-04 RX ORDER — POLYETHYLENE GLYCOL 3350 17 G/17G
17 POWDER, FOR SOLUTION ORAL DAILY
Qty: 527 G | Refills: 1 | Status: SHIPPED | OUTPATIENT
Start: 2023-04-04 | End: 2023-05-04

## 2023-04-04 RX ORDER — PRUCALOPRIDE 2 MG/1
1 TABLET, FILM COATED ORAL DAILY
Qty: 90 TABLET | Refills: 2 | Status: SHIPPED | OUTPATIENT
Start: 2023-04-04

## 2023-04-04 NOTE — PROGRESS NOTES
Chief Complaint     Constipation and Heartburn    History of Present Illness     Addie Yates is a 63 y.o. female who presents to [facility] for follow-up of constipation, gastroparesis, GERD, hiatal hernia and epigastric pain.      Recently started Mobic for foot pain after foot fracture.     She is continuing to experience epigastric pain.  This is worse when bending over.  When these episodes occur, pain is severe.  It is improved some with gas-x.      She is taking motegrity daily.  She is having a bowel movement about every 2 weeks.  Previously tried Linzess, but it caused nausea.      Her last anoscopy was done in December, 2021.  C/r surgery stated that she did not need any further exams.         History      Past Medical History:   Diagnosis Date   • Anxiety disorder, unspecified    • Atherosclerotic heart disease of native coronary artery without angina pectoris    • Carpal tunnel syndrome on right    • Chronic pain     BASE OF SKULL/UPPERMOST NECK   • Constipation, unspecified    • DDD (degenerative disc disease), cervical    • DDD (degenerative disc disease), lumbar    • Diaphragmatic hernia without obstruction and without gangrene    • Dyskinesia of esophagus    • Gastroparesis    • GERD (gastroesophageal reflux disease)    • Glaucoma    • Hiatal hernia    • Hypothyroidism, unspecified    • Long term (current) use of opiate analgesic    • Medial epicondylitis    • MI (myocardial infarction)     2015   • Osteoarthritis    • Osteoporosis without current pathological fracture    • Other idiopathic scoliosis, site unspecified    • Presence of intraocular lens    • Primary generalized (osteo)arthritis    • Right lateral epicondylitis    • Toxic goiter    • Unspecified hemorrhoids      Past Surgical History:   Procedure Laterality Date   • CARPAL TUNNEL RELEASE Left    • COLONOSCOPY      2007, 2010, 2015 - WITH ADENOMA   • CORONARY ANGIOPLASTY WITH STENT PLACEMENT  11/2015    • ENDOSCOPY      2008, 2016   • ENDOSCOPY N/A 11/04/2022    Procedure: ESOPHAGOGASTRODUODENOSCOPY with biopsy;  Surgeon: Yodit Leroy MD;  Location: [facility] ENDOSCOPY;  Service: Gastroenterology;  Laterality: N/A;  gastritis, hiatal hernia   • HYSTERECTOMY  2004   • LAPAROSCOPIC CHOLECYSTECTOMY  2017   • REFRACTIVE SURGERY Bilateral    • THYROID BIOPSY Right    • TUBAL ABDOMINAL LIGATION      BI-TUBAL   • UPPER GASTROINTESTINAL ENDOSCOPY       Family History   Problem Relation Age of Onset   • Leukemia Other    • Kidney cancer Other    • Diabetes Other    • Malig Hyperthermia Neg Hx         Current Medications       Current Outpatient Medications:   •  aspirin 81 MG EC tablet, Take 1 tablet by mouth Every Night., Disp: , Rfl:   •  atorvastatin (LIPITOR) 80 MG tablet, Take 1 tablet by mouth Daily., Disp: , Rfl:   •  busPIRone (BUSPAR) 5 MG tablet, Take 1 tablet by mouth 2 (Two) Times a Day., Disp: 180 tablet, Rfl: 1  •  Calcium 500-125 MG-UNIT tablet, Take 1 tablet by mouth Daily., Disp: , Rfl:   •  denosumab (Prolia) 60 MG/ML solution prefilled syringe syringe, Inject 1 mL under the skin into the appropriate area as directed Every 6 (Six) Months for 180 days. Indications: Postmenopausal Osteoporosis, Disp: 1 mL, Rfl: 0  •  docusate sodium (COLACE) 100 MG capsule, Take 1 capsule by mouth 2 (Two) Times a Day., Disp: , Rfl:   •  DULoxetine (CYMBALTA) 60 MG capsule, Take 1 capsule by mouth Daily., Disp: 90 capsule, Rfl: 1  •  levothyroxine (SYNTHROID, LEVOTHROID) 100 MCG tablet, TAKE ONE TABLET BY MOUTH EVERY DAY, Disp: 30 tablet, Rfl: 5  •  lisinopril (PRINIVIL,ZESTRIL) 5 MG tablet, Take 1 tablet by mouth Every Night., Disp: , Rfl:   •  meloxicam (MOBIC) 15 MG tablet, Take 1 tablet by mouth Daily., Disp: , Rfl:   •  omeprazole (priLOSEC) 40 MG capsule, Take 1 capsule by mouth Daily., Disp: 30 capsule, Rfl: 2  •  Prucalopride Succinate (Motegrity) 2 MG tablet, Take 1 tablet by mouth Daily., Disp: 90 tablet,  "Rfl: 2  •  simethicone (MYLICON,GAS-X) 125 MG capsule capsule, Take 1 capsule by mouth As Needed., Disp: , Rfl:   •  traMADol (ULTRAM) 50 MG tablet, Take 1 tablet by mouth 2 (Two) Times a Day As Needed for Moderate Pain., Disp: 60 tablet, Rfl: 2  •  polyethylene glycol (MIRALAX) 17 GM/SCOOP powder, Take 17 g by mouth Daily for 30 days., Disp: 527 g, Rfl: 1    Current Facility-Administered Medications:   •  denosumab (PROLIA) syringe 60 mg, 60 mg, Subcutaneous, Q6 Months, Jammie Phillips MD, 60 mg at 02/10/23 1043     Allergies     No Known Allergies    Social History       Social History     Social History Narrative   • Not on file         Objective       /87 (BP Location: Left arm, Patient Position: Sitting, Cuff Size: Adult)   Pulse 70   Ht 162.6 cm (64\")   Wt 62.1 kg (137 lb)   BMI 23.52 kg/m²       Physical Exam    Results       Result Review :    The following data was reviewed by: KEVON Mahan on 04/04/2023:      CMP    CMP 8/10/22 11/8/22   Glucose 99 90   BUN 20 16   Creatinine 0.82 0.72   eGFR 80.5 94.1   Sodium 141 142   Potassium 4.3 4.4   Chloride 104 106   Calcium 9.5 8.9   Total Protein 6.4    Albumin 4.20    Globulin 2.2    Total Bilirubin 0.4    Alkaline Phosphatase 79    AST (SGOT) 26    ALT (SGPT) 23    Albumin/Globulin Ratio 1.9    BUN/Creatinine Ratio 24.4 22.2   Anion Gap 9.9 6.8      Comments are available for some flowsheets but are not being displayed.           11/4/2022 EGD-normal esophagus.  Small hiatal hernia.  Diffuse mildly erythematous mucosa found in the gastric antrum, biopsy-mild reactive gastropathy.  Negative for H. pylori.  Negative for intestinal metaplasia.  Normal duodenum.               Assessment and Plan              Diagnoses and all orders for this visit:    1. Epigastric pain (Primary)    2. Chronic idiopathic constipation  -     polyethylene glycol (MIRALAX) 17 GM/SCOOP powder; Take 17 g by mouth Daily for 30 days.  Dispense: 527 g; " Refill: 1  -     Prucalopride Succinate (Motegrity) 2 MG tablet; Take 1 tablet by mouth Daily.  Dispense: 90 tablet; Refill: 2    3. Chronic gastritis without bleeding, unspecified gastritis type      Recommend to continue omeprazole for gastritis.  Discussed dietary and activity modifications related to hiatal hernia.          Follow Up     Follow Up   Return in about 6 months (around 10/4/2023) for constipation.  Patient was given instructions and counseling regarding her condition or for health maintenance advice. Please see specific information pulled into the AVS if appropriate.

## 2023-04-06 ENCOUNTER — PREP FOR SURGERY (OUTPATIENT)
Dept: OTHER | Facility: HOSPITAL | Age: 63
End: 2023-04-06
Payer: MEDICARE

## 2023-04-06 DIAGNOSIS — Z12.12 SCREENING FOR COLORECTAL CANCER: Primary | ICD-10-CM

## 2023-04-06 DIAGNOSIS — Z12.11 SCREENING FOR COLORECTAL CANCER: Primary | ICD-10-CM

## 2023-04-06 RX ORDER — POLYETHYLENE GLYCOL 3350, SODIUM SULFATE, SODIUM CHLORIDE, POTASSIUM CHLORIDE, ASCORBIC ACID, SODIUM ASCORBATE 140-9-5.2G
1 KIT ORAL TAKE AS DIRECTED
Qty: 1 EACH | Refills: 0 | Status: SHIPPED | OUTPATIENT
Start: 2023-04-06

## 2023-04-12 PROBLEM — Z12.12 SCREENING FOR COLORECTAL CANCER: Status: ACTIVE | Noted: 2023-04-12

## 2023-04-12 PROBLEM — Z12.11 SCREENING FOR COLORECTAL CANCER: Status: ACTIVE | Noted: 2023-04-12

## 2023-04-12 RX ORDER — SOD SULF/POT CHLORIDE/MAG SULF 1.479 G
12 TABLET ORAL TAKE AS DIRECTED
Qty: 24 TABLET | Refills: 0 | Status: SHIPPED | OUTPATIENT
Start: 2023-04-12

## 2023-04-12 NOTE — TELEPHONE ENCOUNTER
----- Message from KEVON Mahan sent at 4/6/2023  4:03 PM EDT -----  Regarding: schedule colonoscopy  Please let pt know that I reviewed her case with Dr. Leroy and she feels that she should repeat her colonoscopy this September.  Please schedule.

## 2023-04-12 NOTE — TELEPHONE ENCOUNTER
Spoke with patient and scheduled for a colonoscopy for 9/11/23 and patient verbalized understanding.

## 2023-04-26 ENCOUNTER — TELEPHONE (OUTPATIENT)
Dept: FAMILY MEDICINE CLINIC | Age: 63
End: 2023-04-26
Payer: MEDICARE

## 2023-04-26 NOTE — TELEPHONE ENCOUNTER
Please call Addie.  I received the recommendation from [facility] dentistry regarding her dental implants.  They had a question regarding timing on her Prolia.  I was looking back in her chart to see when she last got her Prolia injection.  It appears to me that maybe she was supposed to have this in February and actually had not had it done at that time.  Is this accurate?  If so, this would be the ideal time to do some invasive dental work.  We can continue to hold the Prolia until she is able to have those implants done.  After a couple of months, once her jaw has had time to heal, we can then get her in to have her Prolia injection.  Thanks, MASON

## 2023-04-27 NOTE — TELEPHONE ENCOUNTER
Spoke to pt and she states she did have her Prolia injection in Feb and her next one is due on Aug 10th.  Please advise.

## 2023-04-27 NOTE — TELEPHONE ENCOUNTER
Thank you, will reply to Dr. Cordova via the provided sheet.  In short, recommendation would be to wait till June for dental implants.  At that point, it would be okay to either continue the normal schedule of Prolia to be given in August OR we could delay administration of the Prolia if she demonstrated delayed healing from her dental implants.  Thanks, MASON

## 2023-05-08 DIAGNOSIS — K44.9 HIATAL HERNIA: ICD-10-CM

## 2023-05-08 DIAGNOSIS — K21.9 GASTROESOPHAGEAL REFLUX DISEASE WITHOUT ESOPHAGITIS: ICD-10-CM

## 2023-05-09 RX ORDER — OMEPRAZOLE 40 MG/1
CAPSULE, DELAYED RELEASE ORAL
Qty: 30 CAPSULE | Refills: 2 | Status: SHIPPED | OUTPATIENT
Start: 2023-05-09

## 2023-05-12 ENCOUNTER — OFFICE VISIT (OUTPATIENT)
Dept: FAMILY MEDICINE CLINIC | Age: 63
End: 2023-05-12
Payer: MEDICARE

## 2023-05-12 ENCOUNTER — LAB (OUTPATIENT)
Dept: LAB | Facility: HOSPITAL | Age: 63
End: 2023-05-12
Payer: MEDICARE

## 2023-05-12 VITALS
DIASTOLIC BLOOD PRESSURE: 87 MMHG | SYSTOLIC BLOOD PRESSURE: 119 MMHG | WEIGHT: 136.6 LBS | OXYGEN SATURATION: 95 % | HEIGHT: 64 IN | HEART RATE: 75 BPM | BODY MASS INDEX: 23.32 KG/M2

## 2023-05-12 DIAGNOSIS — E03.9 ACQUIRED HYPOTHYROIDISM: ICD-10-CM

## 2023-05-12 DIAGNOSIS — I10 ESSENTIAL HYPERTENSION: ICD-10-CM

## 2023-05-12 DIAGNOSIS — F41.8 OTHER SPECIFIED ANXIETY DISORDERS: ICD-10-CM

## 2023-05-12 DIAGNOSIS — I25.10 CORONARY ARTERY DISEASE INVOLVING NATIVE CORONARY ARTERY OF NATIVE HEART WITHOUT ANGINA PECTORIS: ICD-10-CM

## 2023-05-12 DIAGNOSIS — Z79.899 HIGH RISK MEDICATION USE: ICD-10-CM

## 2023-05-12 DIAGNOSIS — E78.2 MIXED HYPERLIPIDEMIA: ICD-10-CM

## 2023-05-12 DIAGNOSIS — M15.9 GENERALIZED OSTEOARTHRITIS: Primary | ICD-10-CM

## 2023-05-12 DIAGNOSIS — K21.9 GASTROESOPHAGEAL REFLUX DISEASE WITHOUT ESOPHAGITIS: ICD-10-CM

## 2023-05-12 PROBLEM — Z12.12 SCREENING FOR COLORECTAL CANCER: Status: RESOLVED | Noted: 2023-04-12 | Resolved: 2023-05-12

## 2023-05-12 PROBLEM — Z12.11 SCREENING FOR COLORECTAL CANCER: Status: RESOLVED | Noted: 2023-04-12 | Resolved: 2023-05-12

## 2023-05-12 LAB
ALBUMIN SERPL-MCNC: 4.2 G/DL (ref 3.5–5.2)
ALBUMIN/GLOB SERPL: 1.7 G/DL
ALP SERPL-CCNC: 83 U/L (ref 39–117)
ALT SERPL W P-5'-P-CCNC: 18 U/L (ref 1–33)
AMPHET+METHAMPHET UR QL: NEGATIVE
AMPHETAMINES UR QL: NEGATIVE
ANION GAP SERPL CALCULATED.3IONS-SCNC: 10.6 MMOL/L (ref 5–15)
AST SERPL-CCNC: 24 U/L (ref 1–32)
BARBITURATES UR QL SCN: NEGATIVE
BASOPHILS # BLD AUTO: 0.05 10*3/MM3 (ref 0–0.2)
BASOPHILS NFR BLD AUTO: 0.8 % (ref 0–1.5)
BENZODIAZ UR QL SCN: NEGATIVE
BILIRUB SERPL-MCNC: 0.4 MG/DL (ref 0–1.2)
BUN SERPL-MCNC: 20 MG/DL (ref 8–23)
BUN/CREAT SERPL: 23 (ref 7–25)
BUPRENORPHINE SERPL-MCNC: NEGATIVE NG/ML
CALCIUM SPEC-SCNC: 9.7 MG/DL (ref 8.6–10.5)
CANNABINOIDS SERPL QL: NEGATIVE
CHLORIDE SERPL-SCNC: 102 MMOL/L (ref 98–107)
CHOLEST SERPL-MCNC: 146 MG/DL (ref 0–200)
CO2 SERPL-SCNC: 28.4 MMOL/L (ref 22–29)
COCAINE UR QL: NEGATIVE
CREAT SERPL-MCNC: 0.87 MG/DL (ref 0.57–1)
DEPRECATED RDW RBC AUTO: 48.7 FL (ref 37–54)
EGFRCR SERPLBLD CKD-EPI 2021: 74.5 ML/MIN/1.73
EOSINOPHIL # BLD AUTO: 0.18 10*3/MM3 (ref 0–0.4)
EOSINOPHIL NFR BLD AUTO: 2.9 % (ref 0.3–6.2)
ERYTHROCYTE [DISTWIDTH] IN BLOOD BY AUTOMATED COUNT: 13.8 % (ref 12.3–15.4)
EXPIRATION DATE: NORMAL
GLOBULIN UR ELPH-MCNC: 2.5 GM/DL
GLUCOSE SERPL-MCNC: 92 MG/DL (ref 65–99)
HCT VFR BLD AUTO: 42.9 % (ref 34–46.6)
HDLC SERPL-MCNC: 68 MG/DL (ref 40–60)
HGB BLD-MCNC: 13.7 G/DL (ref 12–15.9)
IMM GRANULOCYTES # BLD AUTO: 0 10*3/MM3 (ref 0–0.05)
IMM GRANULOCYTES NFR BLD AUTO: 0 % (ref 0–0.5)
LDLC SERPL CALC-MCNC: 68 MG/DL (ref 0–100)
LDLC/HDLC SERPL: 1.02 {RATIO}
LYMPHOCYTES # BLD AUTO: 1.94 10*3/MM3 (ref 0.7–3.1)
LYMPHOCYTES NFR BLD AUTO: 31.7 % (ref 19.6–45.3)
Lab: NORMAL
MCH RBC QN AUTO: 30.4 PG (ref 26.6–33)
MCHC RBC AUTO-ENTMCNC: 31.9 G/DL (ref 31.5–35.7)
MCV RBC AUTO: 95.3 FL (ref 79–97)
MDMA UR QL SCN: NEGATIVE
METHADONE UR QL SCN: NEGATIVE
MONOCYTES # BLD AUTO: 0.38 10*3/MM3 (ref 0.1–0.9)
MONOCYTES NFR BLD AUTO: 6.2 % (ref 5–12)
NEUTROPHILS NFR BLD AUTO: 3.57 10*3/MM3 (ref 1.7–7)
NEUTROPHILS NFR BLD AUTO: 58.4 % (ref 42.7–76)
OPIATES UR QL: NEGATIVE
OXYCODONE UR QL SCN: NEGATIVE
PCP UR QL SCN: NEGATIVE
PLATELET # BLD AUTO: 192 10*3/MM3 (ref 140–450)
PMV BLD AUTO: 10.1 FL (ref 6–12)
POTASSIUM SERPL-SCNC: 4.1 MMOL/L (ref 3.5–5.2)
PROT SERPL-MCNC: 6.7 G/DL (ref 6–8.5)
RBC # BLD AUTO: 4.5 10*6/MM3 (ref 3.77–5.28)
SODIUM SERPL-SCNC: 141 MMOL/L (ref 136–145)
TRIGL SERPL-MCNC: 44 MG/DL (ref 0–150)
TSH SERPL DL<=0.05 MIU/L-ACNC: 1.79 UIU/ML (ref 0.27–4.2)
VLDLC SERPL-MCNC: 10 MG/DL (ref 5–40)
WBC NRBC COR # BLD: 6.12 10*3/MM3 (ref 3.4–10.8)

## 2023-05-12 PROCEDURE — 85025 COMPLETE CBC W/AUTO DIFF WBC: CPT

## 2023-05-12 PROCEDURE — 80061 LIPID PANEL: CPT

## 2023-05-12 PROCEDURE — 3079F DIAST BP 80-89 MM HG: CPT | Performed by: FAMILY MEDICINE

## 2023-05-12 PROCEDURE — 1159F MED LIST DOCD IN RCRD: CPT | Performed by: FAMILY MEDICINE

## 2023-05-12 PROCEDURE — 36415 COLL VENOUS BLD VENIPUNCTURE: CPT

## 2023-05-12 PROCEDURE — 80305 DRUG TEST PRSMV DIR OPT OBS: CPT | Performed by: FAMILY MEDICINE

## 2023-05-12 PROCEDURE — 99214 OFFICE O/P EST MOD 30 MIN: CPT | Performed by: FAMILY MEDICINE

## 2023-05-12 PROCEDURE — 80053 COMPREHEN METABOLIC PANEL: CPT

## 2023-05-12 PROCEDURE — 1160F RVW MEDS BY RX/DR IN RCRD: CPT | Performed by: FAMILY MEDICINE

## 2023-05-12 PROCEDURE — 84443 ASSAY THYROID STIM HORMONE: CPT

## 2023-05-12 PROCEDURE — 3074F SYST BP LT 130 MM HG: CPT | Performed by: FAMILY MEDICINE

## 2023-05-12 RX ORDER — BUSPIRONE HYDROCHLORIDE 5 MG/1
5 TABLET ORAL 2 TIMES DAILY
Qty: 180 TABLET | Refills: 1 | Status: SHIPPED | OUTPATIENT
Start: 2023-05-12

## 2023-05-12 RX ORDER — DULOXETIN HYDROCHLORIDE 60 MG/1
60 CAPSULE, DELAYED RELEASE ORAL DAILY
Qty: 90 CAPSULE | Refills: 1 | Status: SHIPPED | OUTPATIENT
Start: 2023-05-12

## 2023-05-12 RX ORDER — KETOCONAZOLE 20 MG/ML
SHAMPOO TOPICAL
COMMUNITY
Start: 2023-04-17

## 2023-05-12 NOTE — ASSESSMENT & PLAN NOTE
She has noticed some increased fatigue since we decreased her levothyroxine after last blood work.  Checking labs again today.  For now, continue levothyroxine 100 mcg daily and we will titrate as needed.

## 2023-05-12 NOTE — ASSESSMENT & PLAN NOTE
Stable on buspirone 5 mg twice daily and duloxetine 60 mg daily.  Refill sent on both today as below.  Symptoms have been well controlled.  Continue to monitor.

## 2023-05-12 NOTE — ASSESSMENT & PLAN NOTE
Blood pressure running at goal.  Continue lisinopril 5 mg daily, primarily for CAD.  No refills needed.  Checking labs.

## 2023-05-12 NOTE — PROGRESS NOTES
"Chief Complaint  Osteoarthritis (3 month follow up)    Subjective          Addie Yates presents to [facility] today for f/u of routine problems.    She fractured her foot since she was last seen here.  Unfortunately having some malunion of that fracture, so she goes Monday for surgery.  Dr. Man is going to put a screw in.     She is on tramadol and Cymbalta for chronic neck and back pain d/t diffuse OA and scoliosis.  Gabapentin was ineffective.  No adverse effects.  In the past, she has followed with [facility] for injections but those did not help much.  She is no longer being seen there.  She has done PT and seen chiropractors previously, also without relief.      She is on duloxetine and buspirone for anxiety.  Symptoms are fairly well controlled, especially after we increased her buspirone to 5 mg twice daily.      She is on lisinopril for hypertension.  Blood pressure has been well controlled.  No chest pain, palpitations or shortness of breath.  She is on atorvastatin for hyperlipidemia and CAD.  No myalgias.  She is on aspirin as well for the CAD.  S/p stenting in 11/2015.  Follows with Cardiology Wayne LUND.  Saw him yesterday and \"he saw something on EKG\" so he is bringing her back in for a Holter monitor.    She is on levothyroxine for hypothyroidism.  Denies heat or cold intolerance or changes in skin or hair.  She has felt more fatigue since we last adjusted her meds downward.  Due for recheck today.     She is on omeprazole for GERD and esophageal spasm.  Most recent EGD by Dr. Leroy showed hiatal hernia and gastritis.  Gastric emptying study has shown gastroparesis.  She is on Motegrity, docusate, and lactulose for constipation.  She has been diagnosed with high grade dysplasia of the rectum but Colorectal has already signed off on that.  Continues to follow with GI and she is going for colonoscopy with Dr. Leroy in September.      She is " on Prolia for osteoporosis.  She is up-to-date on DEXA, last done 2/2023 and this showed osteopenia.  She is supposed to be doing some dental implants coming up.  Last Prolia injection was given in 2/2023.  We are recommending that she wait until June to have the implants done.  Normal administration of Prolia would be for August, as she would probably be fine to proceed at that time as long as healing from the implants proceeded normally.  She is actually going to hold off on this for now anyway due to cost.       The following portions of the patient's history were reviewed and   updated as appropriate: allergies, current medications, past family history, past medical history, past social history, past surgical history and problem list.      Current Outpatient Medications:   •  aspirin 81 MG EC tablet, Take 1 tablet by mouth Every Night., Disp: , Rfl:   •  atorvastatin (LIPITOR) 80 MG tablet, Take 1 tablet by mouth Daily., Disp: , Rfl:   •  busPIRone (BUSPAR) 5 MG tablet, Take 1 tablet by mouth 2 (Two) Times a Day., Disp: 180 tablet, Rfl: 1  •  Calcium 500-125 MG-UNIT tablet, Take 1 tablet by mouth Daily., Disp: , Rfl:   •  denosumab (Prolia) 60 MG/ML solution prefilled syringe syringe, Inject 1 mL under the skin into the appropriate area as directed Every 6 (Six) Months for 180 days. Indications: Postmenopausal Osteoporosis, Disp: 1 mL, Rfl: 0  •  docusate sodium (COLACE) 100 MG capsule, Take 1 capsule by mouth 2 (Two) Times a Day., Disp: , Rfl:   •  DULoxetine (CYMBALTA) 60 MG capsule, Take 1 capsule by mouth Daily., Disp: 90 capsule, Rfl: 1  •  ketoconazole (NIZORAL) 2 % shampoo, SHAMPOO SCALP 2-3 TIMES WEEKLY AS NEEDED. ALTERNATE WITH OTHER SHAMPOO, Disp: , Rfl:   •  levothyroxine (SYNTHROID, LEVOTHROID) 100 MCG tablet, TAKE ONE TABLET BY MOUTH EVERY DAY, Disp: 30 tablet, Rfl: 5  •  lisinopril (PRINIVIL,ZESTRIL) 5 MG tablet, Take 1 tablet by mouth Every Night., Disp: , Rfl:   •  meloxicam (MOBIC) 15 MG tablet,  "Take 1 tablet by mouth Daily., Disp: , Rfl:   •  omeprazole (priLOSEC) 40 MG capsule, TAKE ONE CAPSULE BY MOUTH EVERY DAY, Disp: 30 capsule, Rfl: 2  •  PEG-KCl-NaCl-NaSulf-Na Asc-C (Plenvu) 140 g reconstituted solution solution, Take 140 g by mouth Take As Directed. Attn: Pharmacist: please see notes for coupon code., Disp: 1 each, Rfl: 0  •  Prucalopride Succinate (Motegrity) 2 MG tablet, Take 1 tablet by mouth Daily., Disp: 90 tablet, Rfl: 2  •  simethicone (MYLICON,GAS-X) 125 MG capsule capsule, Take 1 capsule by mouth As Needed., Disp: , Rfl:   •  Sodium Sulfate-Mag Sulfate-KCl (Sutab) 8147-547-875 MG tablet, Take 12 tablets by mouth Take As Directed., Disp: 24 tablet, Rfl: 0  •  traMADol (ULTRAM) 50 MG tablet, Take 1 tablet by mouth 2 (Two) Times a Day As Needed for Moderate Pain., Disp: 60 tablet, Rfl: 2    Current Facility-Administered Medications:   •  denosumab (PROLIA) syringe 60 mg, 60 mg, Subcutaneous, Q6 Months, Jammie Phillips MD, 60 mg at 02/10/23 1043    Allergies:  Patient has no known allergies.      Objective   Vital Signs:   Vitals:    05/12/23 0959   BP: 119/87   BP Location: Left arm   Patient Position: Sitting   Pulse: 75   SpO2: 95%   Weight: 62 kg (136 lb 9.6 oz)   Height: 162.6 cm (64.02\")       Physical Exam  Vitals reviewed.   Constitutional:       General: She is not in acute distress.     Appearance: Normal appearance. She is well-developed.   HENT:      Head: Normocephalic and atraumatic.      Right Ear: External ear normal.      Left Ear: External ear normal.   Eyes:      Extraocular Movements: Extraocular movements intact.      Conjunctiva/sclera: Conjunctivae normal.      Pupils: Pupils are equal, round, and reactive to light.   Cardiovascular:      Rate and Rhythm: Normal rate and regular rhythm.      Heart sounds: No murmur heard.  Pulmonary:      Effort: Pulmonary effort is normal.      Breath sounds: Normal breath sounds. No wheezing, rhonchi or rales.   Abdominal:      " General: Bowel sounds are normal. There is no distension.      Palpations: Abdomen is soft.      Tenderness: There is no abdominal tenderness.   Musculoskeletal:         General: Tenderness (bilateral cervical and lumbar paraspinals) present. Normal range of motion.   Neurological:      Mental Status: She is alert.   Psychiatric:         Mood and Affect: Affect normal.          Lab Results   Component Value Date    GLUCOSE 90 11/08/2022    BUN 16 11/08/2022    CREATININE 0.72 11/08/2022    EGFRIFNONA 78 12/29/2021    EGFRIFAFRI 76 02/28/2018    BCR 22.2 11/08/2022    K 4.4 11/08/2022    CO2 29.2 (H) 11/08/2022    CALCIUM 8.9 11/08/2022    ALBUMIN 4.20 08/10/2022    LABIL2 2.0 04/15/2021    AST 26 08/10/2022    ALT 23 08/10/2022       Lab Results   Component Value Date    CHOL 142 08/10/2022    CHLPL 135 04/15/2021    TRIG 44 08/10/2022    HDL 69 (H) 08/10/2022    LDL 63 08/10/2022            Assessment and Plan    Diagnoses and all orders for this visit:    1. Generalized osteoarthritis (Primary)  Assessment & Plan:  Stable on current regimen.  Symptoms are well controlled.  No adverse effects. She does require ongoing use of this controlled substance to function.  Tox screen was due today.  Prior tox screen appropriate.  HAYLEY was run today.  Refills were not needed today.  RTC 3 months.        2. High risk medication use  -     POC Urine Drug Screen [facility]    3. Essential hypertension  Assessment & Plan:  Blood pressure running at goal.  Continue lisinopril 5 mg daily, primarily for CAD.  No refills needed.  Checking labs.    Orders:  -     Lipid Panel; Future  -     Comprehensive Metabolic Panel; Future  -     CBC Auto Differential; Future    4. Mixed hyperlipidemia  Assessment & Plan:  Stable on atorvastatin 80 mg daily.  No refills needed today.  Checking labs.      5. Coronary artery disease involving native coronary artery of native heart without angina pectoris  Overview:  Follows with Wayne  Jeremiah LUND, [facility], [place], [place]  Stable on aspirin, statin, ACE-I.      6. Acquired hypothyroidism  Assessment & Plan:  She has noticed some increased fatigue since we decreased her levothyroxine after last blood work.  Checking labs again today.  For now, continue levothyroxine 100 mcg daily and we will titrate as needed.    Orders:  -     TSH; Future    7. Gastroesophageal reflux disease without esophagitis  Overview:  Following with Thuy Caldwell.  Going for colonoscopy with Dr. Leroy in September.      8. Other specified anxiety disorders  Assessment & Plan:  Stable on buspirone 5 mg twice daily and duloxetine 60 mg daily.  Refill sent on both today as below.  Symptoms have been well controlled.  Continue to monitor.    Orders:  -     busPIRone (BUSPAR) 5 MG tablet; Take 1 tablet by mouth 2 (Two) Times a Day.  Dispense: 180 tablet; Refill: 1  -     DULoxetine (CYMBALTA) 60 MG capsule; Take 1 capsule by mouth Daily.  Dispense: 90 capsule; Refill: 1      Follow Up   Return in about 3 months (around 8/12/2023).  Patient was given instructions and counseling regarding her condition or for health maintenance advice. Please see specific information pulled into the AVS if appropriate.           02/10/2023

## 2023-06-15 DIAGNOSIS — M15.9 GENERALIZED OSTEOARTHRITIS: ICD-10-CM

## 2023-06-15 RX ORDER — TRAMADOL HYDROCHLORIDE 50 MG/1
50 TABLET ORAL 2 TIMES DAILY PRN
Qty: 60 TABLET | Refills: 2 | Status: SHIPPED | OUTPATIENT
Start: 2023-06-15

## 2023-07-24 DIAGNOSIS — M81.0 AGE-RELATED OSTEOPOROSIS WITHOUT CURRENT PATHOLOGICAL FRACTURE: ICD-10-CM

## 2023-07-25 RX ORDER — DENOSUMAB 60 MG/ML
INJECTION SUBCUTANEOUS
Qty: 1 ML | Refills: 0 | Status: SHIPPED | OUTPATIENT
Start: 2023-07-25

## 2023-08-04 ENCOUNTER — TELEPHONE (OUTPATIENT)
Dept: GASTROENTEROLOGY | Facility: CLINIC | Age: 63
End: 2023-08-04
Payer: MEDICARE

## 2023-08-07 ENCOUNTER — TELEPHONE (OUTPATIENT)
Dept: GASTROENTEROLOGY | Facility: CLINIC | Age: 63
End: 2023-08-07
Payer: MEDICARE

## 2023-08-07 DIAGNOSIS — K44.9 HIATAL HERNIA: ICD-10-CM

## 2023-08-07 DIAGNOSIS — K21.9 GASTROESOPHAGEAL REFLUX DISEASE WITHOUT ESOPHAGITIS: ICD-10-CM

## 2023-08-07 NOTE — TELEPHONE ENCOUNTER
Spoke with patient to reschedule her appointment on 10/4/23 due to Thuy being out of the office. She reschedule to 8/10/23

## 2023-08-08 RX ORDER — OMEPRAZOLE 40 MG/1
CAPSULE, DELAYED RELEASE ORAL
Qty: 90 CAPSULE | Refills: 1 | Status: SHIPPED | OUTPATIENT
Start: 2023-08-08

## 2023-08-10 ENCOUNTER — OFFICE VISIT (OUTPATIENT)
Dept: GASTROENTEROLOGY | Facility: CLINIC | Age: 63
End: 2023-08-10
Payer: MEDICARE

## 2023-08-10 VITALS
HEIGHT: 64 IN | BODY MASS INDEX: 23.73 KG/M2 | DIASTOLIC BLOOD PRESSURE: 83 MMHG | WEIGHT: 139 LBS | SYSTOLIC BLOOD PRESSURE: 117 MMHG | HEART RATE: 70 BPM

## 2023-08-10 DIAGNOSIS — K58.1 IRRITABLE BOWEL SYNDROME WITH CONSTIPATION: Primary | ICD-10-CM

## 2023-08-10 DIAGNOSIS — K29.50 CHRONIC GASTRITIS WITHOUT BLEEDING, UNSPECIFIED GASTRITIS TYPE: ICD-10-CM

## 2023-08-10 DIAGNOSIS — K44.9 HIATAL HERNIA: ICD-10-CM

## 2023-08-10 RX ORDER — TENAPANOR HYDROCHLORIDE 53.2 MG/1
50 TABLET ORAL 2 TIMES DAILY
Qty: 60 TABLET | Refills: 3 | Status: SHIPPED | OUTPATIENT
Start: 2023-08-10

## 2023-08-10 RX ORDER — THIAMINE HCL 100 MG
TABLET ORAL DAILY
COMMUNITY

## 2023-08-10 NOTE — PROGRESS NOTES
Chief Complaint     Constipation    History of Present Illness     Addie Yates is a 63 y.o. female who presents to [facility] for follow-up of epigastric pain, constipation and gastritis.      Heartburn is controlled with daily omeprazole.  Epigastric pain is improved.    She is still struggling with constipation.  Taking motegrity and miralax daily.  Having a bowel movement about once every two weeks.  Denies rectal bleeding.  Admits intermittent abdominal cramping.    She is scheduled for repeat colonoscopy in September.         History      Past Medical History:   Diagnosis Date    Anxiety disorder, unspecified     Atherosclerotic heart disease of native coronary artery without angina pectoris     Carpal tunnel syndrome on right     Chronic pain     BASE OF SKULL/UPPERMOST NECK    Constipation, unspecified     DDD (degenerative disc disease), cervical     DDD (degenerative disc disease), lumbar     Diaphragmatic hernia without obstruction and without gangrene     Dyskinesia of esophagus     Gastroparesis     GERD (gastroesophageal reflux disease)     Glaucoma     Hiatal hernia     Hypothyroidism, unspecified     Long term (current) use of opiate analgesic     Medial epicondylitis     MI (myocardial infarction)     2015    Osteoarthritis     Osteoporosis without current pathological fracture     Other idiopathic scoliosis, site unspecified     Presence of intraocular lens     Primary generalized (osteo)arthritis     Right lateral epicondylitis     Toxic goiter     Unspecified hemorrhoids      Past Surgical History:   Procedure Laterality Date    CARPAL TUNNEL RELEASE Left     COLONOSCOPY      2007, 2010, 2015 - WITH ADENOMA    CORONARY ANGIOPLASTY WITH STENT PLACEMENT  11/2015    ENDOSCOPY      2008, 2016    ENDOSCOPY N/A 11/04/2022    Procedure: ESOPHAGOGASTRODUODENOSCOPY with biopsy;  Surgeon: Yodit Leroy MD;  Location: [facility];  Service: Gastroenterology;   Laterality: N/A;  gastritis, hiatal hernia    FOOT SURGERY Right 05/15/2023    HYSTERECTOMY  2004    LAPAROSCOPIC CHOLECYSTECTOMY  2017    REFRACTIVE SURGERY Bilateral     THYROID BIOPSY Right     TUBAL ABDOMINAL LIGATION      BI-TUBAL    UPPER GASTROINTESTINAL ENDOSCOPY       Family History   Problem Relation Age of Onset    Leukemia Other     Kidney cancer Other     Diabetes Other     Malig Hyperthermia Neg Hx         Current Medications       Current Outpatient Medications:     aspirin 81 MG EC tablet, Take 1 tablet by mouth Every Night., Disp: , Rfl:     atorvastatin (LIPITOR) 80 MG tablet, Take 1 tablet by mouth Daily., Disp: , Rfl:     busPIRone (BUSPAR) 5 MG tablet, Take 1 tablet by mouth 2 (Two) Times a Day., Disp: 180 tablet, Rfl: 1    Calcium 500-125 MG-UNIT tablet, Take 1 tablet by mouth Daily., Disp: , Rfl:     Calcium Citrate-Vitamin D3 (CITRACAL) 315-6.25 MG-MCG tablet tablet, Take  by mouth Daily., Disp: , Rfl:     docusate sodium (COLACE) 100 MG capsule, Take 1 capsule by mouth 2 (Two) Times a Day., Disp: , Rfl:     DULoxetine (CYMBALTA) 60 MG capsule, Take 1 capsule by mouth Daily., Disp: 90 capsule, Rfl: 1    ketoconazole (NIZORAL) 2 % shampoo, SHAMPOO SCALP 2-3 TIMES WEEKLY AS NEEDED. ALTERNATE WITH OTHER SHAMPOO, Disp: , Rfl:     levothyroxine (SYNTHROID, LEVOTHROID) 100 MCG tablet, TAKE ONE TABLET BY MOUTH EVERY DAY, Disp: 30 tablet, Rfl: 5    lisinopril (PRINIVIL,ZESTRIL) 5 MG tablet, Take 1 tablet by mouth Every Night., Disp: , Rfl:     meloxicam (MOBIC) 15 MG tablet, Take 1 tablet by mouth Daily., Disp: , Rfl:     omeprazole (priLOSEC) 40 MG capsule, TAKE ONE CAPSULE BY MOUTH EVERY DAY, Disp: 90 capsule, Rfl: 1    Prolia 60 MG/ML solution prefilled syringe syringe, INJECT 60MG SUBCUTANEOUSLY EVERY 6 MONTHS, Disp: 1 mL, Rfl: 0    simethicone (MYLICON,GAS-X) 125 MG capsule capsule, Take 1 capsule by mouth As Needed., Disp: , Rfl:     traMADol (ULTRAM) 50 MG tablet, Take 1 tablet by mouth 2  "(Two) Times a Day As Needed for Moderate Pain., Disp: 60 tablet, Rfl: 2    Tenapanor HCl (Ibsrela) 50 MG tablet, Take 1 tablet by mouth 2 (Two) Times a Day., Disp: 60 tablet, Rfl: 3    Current Facility-Administered Medications:     denosumab (PROLIA) syringe 60 mg, 60 mg, Subcutaneous, Q6 Months, Jamime Phillips MD, 60 mg at 02/10/23 1043     Allergies     No Known Allergies    Social History       Social History     Social History Narrative    Not on file         Objective       /83 (BP Location: Left arm, Patient Position: Sitting)   Pulse 70   Ht 162.6 cm (64\")   Wt 63 kg (139 lb)   BMI 23.86 kg/mý       Physical Exam    Results       Result Review :    The following data was reviewed by: KEVON Mahan on 08/10/2023:    CBC w/diff          5/12/2023    10:58   CBC w/Diff   WBC 6.12    RBC 4.50    Hemoglobin 13.7    Hematocrit 42.9    MCV 95.3    MCH 30.4    MCHC 31.9    RDW 13.8    Platelets 192    Neutrophil Rel % 58.4    Immature Granulocyte Rel % 0.0    Lymphocyte Rel % 31.7    Monocyte Rel % 6.2    Eosinophil Rel % 2.9    Basophil Rel % 0.8      CMP          11/8/2022    10:27 5/12/2023    10:58   CMP   Glucose 90  92    BUN 16  20    Creatinine 0.72  0.87    EGFR 94.1  74.5    Sodium 142  141    Potassium 4.4  4.1    Chloride 106  102    Calcium 8.9  9.7    Total Protein  6.7    Albumin  4.2    Globulin  2.5    Total Bilirubin  0.4    Alkaline Phosphatase  83    AST (SGOT)  24    ALT (SGPT)  18    Albumin/Globulin Ratio  1.7    BUN/Creatinine Ratio 22.2  23.0    Anion Gap 6.8  10.6                 Assessment and Plan              Diagnoses and all orders for this visit:    1. Irritable bowel syndrome with constipation (Primary)  -     Tenapanor HCl (Ibsrela) 50 MG tablet; Take 1 tablet by mouth 2 (Two) Times a Day.  Dispense: 60 tablet; Refill: 3    2. Chronic gastritis without bleeding, unspecified gastritis type    3. Hiatal hernia      New Rx sent for Ibsrela.  Discussed " administration with patient.  Continue miralax.  D/c motegrity.      Follow Up     Follow Up   Return in about 6 months (around 2/10/2024) for constipation.  Patient was given instructions and counseling regarding her condition or for health maintenance advice. Please see specific information pulled into the AVS if appropriate.

## 2023-08-16 ENCOUNTER — OFFICE VISIT (OUTPATIENT)
Dept: FAMILY MEDICINE CLINIC | Age: 63
End: 2023-08-16
Payer: MEDICARE

## 2023-08-16 VITALS
DIASTOLIC BLOOD PRESSURE: 91 MMHG | SYSTOLIC BLOOD PRESSURE: 143 MMHG | HEART RATE: 60 BPM | HEIGHT: 64 IN | BODY MASS INDEX: 24.41 KG/M2 | OXYGEN SATURATION: 92 % | WEIGHT: 143 LBS

## 2023-08-16 DIAGNOSIS — I25.10 CORONARY ARTERY DISEASE INVOLVING NATIVE CORONARY ARTERY OF NATIVE HEART WITHOUT ANGINA PECTORIS: ICD-10-CM

## 2023-08-16 DIAGNOSIS — Z79.899 HIGH RISK MEDICATION USE: ICD-10-CM

## 2023-08-16 DIAGNOSIS — E03.9 ACQUIRED HYPOTHYROIDISM: ICD-10-CM

## 2023-08-16 DIAGNOSIS — M81.0 AGE-RELATED OSTEOPOROSIS WITHOUT CURRENT PATHOLOGICAL FRACTURE: ICD-10-CM

## 2023-08-16 DIAGNOSIS — K21.9 GASTROESOPHAGEAL REFLUX DISEASE WITHOUT ESOPHAGITIS: ICD-10-CM

## 2023-08-16 DIAGNOSIS — F41.1 GENERALIZED ANXIETY DISORDER: ICD-10-CM

## 2023-08-16 DIAGNOSIS — I10 ESSENTIAL HYPERTENSION: ICD-10-CM

## 2023-08-16 DIAGNOSIS — M15.9 GENERALIZED OSTEOARTHRITIS: Primary | ICD-10-CM

## 2023-08-16 DIAGNOSIS — E78.2 MIXED HYPERLIPIDEMIA: ICD-10-CM

## 2023-08-16 RX ORDER — LEVOTHYROXINE SODIUM 0.1 MG/1
100 TABLET ORAL DAILY
Qty: 90 TABLET | Refills: 1 | Status: SHIPPED | OUTPATIENT
Start: 2023-08-16

## 2023-08-16 NOTE — ASSESSMENT & PLAN NOTE
Stable on atorvastatin 80 mg daily.  Refills were not needed today.  Labs were not due today.  Continue to monitor.

## 2023-08-16 NOTE — ASSESSMENT & PLAN NOTE
She ended up electing not to have the dental implants done, so we are proceeding with reinitiation of Prolia and she will get that injection later today.

## 2023-08-16 NOTE — ASSESSMENT & PLAN NOTE
Blood pressure has been running at goal.  Continue lisinopril 5 mg daily.  Refills were not needed today.  Labs were not needed today.  Continue to monitor.

## 2023-08-16 NOTE — PROGRESS NOTES
"Chief Complaint  Osteoarthritis (3 month f/u)    Subjective          Addie Yates presents to [facility] today for follow-up of routine problems.    She has a new granddaughter!  Three months old.    She had R foot surgery back in May.  That has been healing steadily albeit slowly.  She is out of the boot now, at least, and off the scooter.       She is on tramadol and duloxetine for chronic neck and back pain due to generalized arthritis and scoliosis.  Denies adverse effects.  Gabapentin was ineffective.  In the past, she has followed with Physical Therapy, chiropractor, and [facility] for injections but of these gave her much relief.      She is on duloxetine and buspirone for anxiety.  Symptoms are fairly well controlled.  Mood has been \"pretty good.\"        She is on lisinopril for HTN.  Her blood pressure has been well controlled.  No chest pain, palpitations or shortness of breath.  She is on atorvastatin for HLD and CAD.  No myalgias.  She is on ASA for CAD.  S/p stenting in 11/2015.  Following with Cardiology Wayne LUND.    She is on levothyroxine for hypothyroidism.  No heat/cold intolerance, changes in skin or hair.     She is on omeprazole for GERD and esophageal spasm.  Most recent EGD by Dr. Leroy showed hiatal hernia and gastritis.  Gastric emptying study has shown gastroparesis.  She is on Motegrity, docusate, and lactulose for constipation.  She has been diagnosed with high grade dysplasia of the rectum but Colorectal has already signed off on that.  Following with Thuy Caldwell and Dr. Leroy GI.  Colonoscopy scheduled for next month.        She has been on Prolia for osteoporosis.  She is up-to-date on DEXA, last done 2/2023 and this showed osteopenia.  She was considering getting dental implants back in June, so we have been holding off on Prolia.  However, she ultimately elected not to have this done.  She is getting her injection again " today.      Current Outpatient Medications:     aspirin 81 MG EC tablet, Take 1 tablet by mouth Every Night., Disp: , Rfl:     atorvastatin (LIPITOR) 80 MG tablet, Take 1 tablet by mouth Daily., Disp: , Rfl:     busPIRone (BUSPAR) 5 MG tablet, Take 1 tablet by mouth 2 (Two) Times a Day., Disp: 180 tablet, Rfl: 1    Calcium 500-125 MG-UNIT tablet, Take 1 tablet by mouth Daily., Disp: , Rfl:     Calcium Citrate-Vitamin D3 (CITRACAL) 315-6.25 MG-MCG tablet tablet, Take  by mouth Daily., Disp: , Rfl:     docusate sodium (COLACE) 100 MG capsule, Take 1 capsule by mouth 2 (Two) Times a Day., Disp: , Rfl:     DULoxetine (CYMBALTA) 60 MG capsule, Take 1 capsule by mouth Daily., Disp: 90 capsule, Rfl: 1    ketoconazole (NIZORAL) 2 % shampoo, SHAMPOO SCALP 2-3 TIMES WEEKLY AS NEEDED. ALTERNATE WITH OTHER SHAMPOO, Disp: , Rfl:     levothyroxine (SYNTHROID, LEVOTHROID) 100 MCG tablet, Take 1 tablet by mouth Daily., Disp: 90 tablet, Rfl: 1    lisinopril (PRINIVIL,ZESTRIL) 5 MG tablet, Take 1 tablet by mouth Every Night., Disp: , Rfl:     meloxicam (MOBIC) 15 MG tablet, Take 1 tablet by mouth Daily., Disp: , Rfl:     omeprazole (priLOSEC) 40 MG capsule, TAKE ONE CAPSULE BY MOUTH EVERY DAY, Disp: 90 capsule, Rfl: 1    Prolia 60 MG/ML solution prefilled syringe syringe, INJECT 60MG SUBCUTANEOUSLY EVERY 6 MONTHS, Disp: 1 mL, Rfl: 0    simethicone (MYLICON,GAS-X) 125 MG capsule capsule, Take 1 capsule by mouth As Needed., Disp: , Rfl:     traMADol (ULTRAM) 50 MG tablet, Take 1 tablet by mouth 2 (Two) Times a Day As Needed for Moderate Pain., Disp: 60 tablet, Rfl: 2    Tenapanor HCl (Ibsrela) 50 MG tablet, Take 1 tablet by mouth 2 (Two) Times a Day., Disp: 60 tablet, Rfl: 3    Current Facility-Administered Medications:     denosumab (PROLIA) syringe 60 mg, 60 mg, Subcutaneous, Q6 Months, Jammie Phillips MD, 60 mg at 02/10/23 1044    Allergies:  Patient has no known allergies.      Objective   Vital Signs:   Vitals:     "08/16/23 0859 08/16/23 0917   BP: 135/91 143/91   BP Location: Left arm Left arm   Patient Position: Sitting    Cuff Size: Adult    Pulse: 60 60   SpO2: 92%    Weight: 64.9 kg (143 lb)    Height: 162.6 cm (64.02\")        Physical Exam  Vitals reviewed.   Constitutional:       General: She is not in acute distress.     Appearance: Normal appearance. She is well-developed.   HENT:      Head: Normocephalic and atraumatic.      Right Ear: External ear normal.      Left Ear: External ear normal.   Eyes:      Extraocular Movements: Extraocular movements intact.      Conjunctiva/sclera: Conjunctivae normal.      Pupils: Pupils are equal, round, and reactive to light.   Cardiovascular:      Rate and Rhythm: Normal rate and regular rhythm.      Heart sounds: No murmur heard.  Pulmonary:      Effort: Pulmonary effort is normal.      Breath sounds: Normal breath sounds. No wheezing, rhonchi or rales.   Abdominal:      General: Bowel sounds are normal. There is no distension.      Palpations: Abdomen is soft.      Tenderness: There is no abdominal tenderness.   Musculoskeletal:         General: Tenderness (bilateral cervical and lumbar paraspinals) present. Normal range of motion.   Neurological:      Mental Status: She is alert.   Psychiatric:         Mood and Affect: Affect normal.        Lab Results   Component Value Date    GLUCOSE 92 05/12/2023    BUN 20 05/12/2023    CREATININE 0.87 05/12/2023    EGFRIFNONA 78 12/29/2021    EGFRIFAFRI 76 02/28/2018    BCR 23.0 05/12/2023    K 4.1 05/12/2023    CO2 28.4 05/12/2023    CALCIUM 9.7 05/12/2023    ALBUMIN 4.2 05/12/2023    LABIL2 2.0 04/15/2021    AST 24 05/12/2023    ALT 18 05/12/2023       Lab Results   Component Value Date    CHOL 146 05/12/2023    CHLPL 135 04/15/2021    TRIG 44 05/12/2023    HDL 68 (H) 05/12/2023    LDL 68 05/12/2023            Assessment and Plan    Diagnoses and all orders for this visit:    1. Generalized osteoarthritis (Primary)  Assessment & " Plan:  Stable on current regimen.  Symptoms are well controlled.  No adverse effects. She does require ongoing use of this controlled substance to function.  Tox screen was due today.  Prior tox screen appropriate.  HAYLEY was run today.  Refills were not needed today.  RTC 3 months.        2. High risk medication use  -     POC Urine Drug Screen [facility]    3. Essential hypertension  Assessment & Plan:  Blood pressure has been running at goal.  Continue lisinopril 5 mg daily.  Refills were not needed today.  Labs were not needed today.  Continue to monitor.        4. Mixed hyperlipidemia  Assessment & Plan:  Stable on atorvastatin 80 mg daily.  Refills were not needed today.  Labs were not due today.  Continue to monitor.        5. Coronary artery disease involving native coronary artery of native heart without angina pectoris  Overview:  Follows with Wayne LUND, [facility], [place], [place]  Stable on aspirin, statin, ACE-I.      6. Acquired hypothyroidism  Assessment & Plan:  Stable on levothyroxine 100 mcg daily.  Refills were needed today.  Labs were not due today.  Continue to monitor.      Orders:  -     levothyroxine (SYNTHROID, LEVOTHROID) 100 MCG tablet; Take 1 tablet by mouth Daily.  Dispense: 90 tablet; Refill: 1    7. Age-related osteoporosis without current pathological fracture  Overview:  On Prolia.  Last DEXA was done 2/2021 showing improved osteopenia.    Assessment & Plan:  She ended up electing not to have the dental implants done, so we are proceeding with reinitiation of Prolia and she will get that injection later today.      8. Generalized anxiety disorder  Assessment & Plan:  Stable on buspirone 5 mg twice daily and duloxetine 60 mg daily.  No refills needed today.  Continue to monitor.      9. Gastroesophageal reflux disease without esophagitis  Overview:  Following with Thuy Caldwell.  Going for colonoscopy with Dr. Leroy in September.          Follow Up   Return in  about 3 months (around 11/16/2023) for Medicare Wellness.  Patient was given instructions and counseling regarding her condition or for health maintenance advice. Please see specific information pulled into the AVS if appropriate.           02/10/2023

## 2023-08-16 NOTE — ASSESSMENT & PLAN NOTE
Stable on levothyroxine 100 mcg daily.  Refills were needed today.  Labs were not due today.  Continue to monitor.     80 F PMH CVA with residual R sided weakness, dementia c/b dysphagia s/p PEG, nonverbal at baseline, chronic atrial fibrillation on Eliquis, p/w dislodged PEG tube.      Last admitted 5/2019 with similar presentation, had PEG replaced by house GI team.     VS: 97.6, 71, 155/71, 17, 98% Ra.  Labs: cbc unrevealing, INR 1.19, cmp with mild transaminitis otherwise unrevealing. 80 F PMH CVA with residual R sided weakness, dementia c/b dysphagia s/p PEG, nonverbal at baseline, chronic atrial fibrillation on Eliquis, p/w dislodged PEG tube.  Pt unable to provide hx 2/2 nonverbal status. Call placed to family (granddaughter Eve and son Stas) unanswered overnight.  Hx obtained from ER documentation/chart. It appears PEG tube dislodged today >6 hrs. Family documented as denying all other sx as per ER note; no fever/diarrhea.     Last admitted 5/2019 with similar presentation, had PEG replaced by house GI team.     VS: 97.6, 71, 155/71, 17, 98% Ra.  Labs: cbc unrevealing, INR 1.19, cmp with mild transaminitis otherwise unrevealing.

## 2023-08-16 NOTE — ASSESSMENT & PLAN NOTE
Stable on buspirone 5 mg twice daily and duloxetine 60 mg daily.  No refills needed today.  Continue to monitor.

## 2023-08-28 ENCOUNTER — TELEPHONE (OUTPATIENT)
Dept: GASTROENTEROLOGY | Facility: CLINIC | Age: 63
End: 2023-08-28
Payer: MEDICARE

## 2023-09-05 NOTE — PRE-PROCEDURE INSTRUCTIONS
"Instructed on date and arrival time of 0700. Come to entrance \"C\". Must have  over age 18 to drive home.  May have two visitors; however, children under 12 must stay in waiting room.  Discussed clear liquid diet (no red or purple) and bowel prep.  May take medications as usual except for blood thinners, diabetic medications, and weight loss medications.  Bring list of medications.  Verbalized understanding of instructions given.  Instructed to call for questions or concerns.  Cardiac clearance noted in chart.  "

## 2023-09-08 PROBLEM — Z12.11 SCREENING FOR COLORECTAL CANCER: Status: ACTIVE | Noted: 2023-04-12

## 2023-09-08 PROBLEM — Z12.12 SCREENING FOR COLORECTAL CANCER: Status: ACTIVE | Noted: 2023-04-12

## 2023-09-10 RX ORDER — SODIUM CHLORIDE, SODIUM LACTATE, POTASSIUM CHLORIDE, CALCIUM CHLORIDE 600; 310; 30; 20 MG/100ML; MG/100ML; MG/100ML; MG/100ML
30 INJECTION, SOLUTION INTRAVENOUS CONTINUOUS
Status: CANCELLED | OUTPATIENT
Start: 2023-09-10

## 2023-09-11 ENCOUNTER — HOSPITAL ENCOUNTER (OUTPATIENT)
Facility: HOSPITAL | Age: 63
Setting detail: HOSPITAL OUTPATIENT SURGERY
Discharge: HOME | End: 2023-09-11
Attending: INTERNAL MEDICINE | Admitting: INTERNAL MEDICINE
Payer: MEDICARE

## 2023-09-11 ENCOUNTER — ANESTHESIA EVENT (OUTPATIENT)
Dept: GASTROENTEROLOGY | Facility: HOSPITAL | Age: 63
End: 2023-09-11
Payer: MEDICARE

## 2023-09-11 ENCOUNTER — ANESTHESIA (OUTPATIENT)
Dept: GASTROENTEROLOGY | Facility: HOSPITAL | Age: 63
End: 2023-09-11
Payer: MEDICARE

## 2023-09-11 VITALS
DIASTOLIC BLOOD PRESSURE: 76 MMHG | TEMPERATURE: 97.5 F | OXYGEN SATURATION: 98 % | RESPIRATION RATE: 18 BRPM | HEART RATE: 62 BPM | WEIGHT: 145.5 LBS | SYSTOLIC BLOOD PRESSURE: 110 MMHG | BODY MASS INDEX: 24.96 KG/M2

## 2023-09-11 DIAGNOSIS — Z12.11 SCREENING FOR COLORECTAL CANCER: ICD-10-CM

## 2023-09-11 DIAGNOSIS — Z12.12 SCREENING FOR COLORECTAL CANCER: ICD-10-CM

## 2023-09-11 PROCEDURE — 88342 IMHCHEM/IMCYTCHM 1ST ANTB: CPT | Performed by: INTERNAL MEDICINE

## 2023-09-11 PROCEDURE — 45380 COLONOSCOPY AND BIOPSY: CPT | Performed by: INTERNAL MEDICINE

## 2023-09-11 PROCEDURE — 45385 COLONOSCOPY W/LESION REMOVAL: CPT | Performed by: INTERNAL MEDICINE

## 2023-09-11 PROCEDURE — 25010000002 PROPOFOL 10 MG/ML EMULSION: Performed by: NURSE ANESTHETIST, CERTIFIED REGISTERED

## 2023-09-11 PROCEDURE — 88305 TISSUE EXAM BY PATHOLOGIST: CPT | Performed by: INTERNAL MEDICINE

## 2023-09-11 RX ORDER — LIDOCAINE HYDROCHLORIDE 20 MG/ML
INJECTION, SOLUTION EPIDURAL; INFILTRATION; INTRACAUDAL; PERINEURAL AS NEEDED
Status: DISCONTINUED | OUTPATIENT
Start: 2023-09-11 | End: 2023-09-11 | Stop reason: SURG

## 2023-09-11 RX ORDER — SODIUM CHLORIDE, SODIUM LACTATE, POTASSIUM CHLORIDE, CALCIUM CHLORIDE 600; 310; 30; 20 MG/100ML; MG/100ML; MG/100ML; MG/100ML
INJECTION, SOLUTION INTRAVENOUS CONTINUOUS PRN
Status: DISCONTINUED | OUTPATIENT
Start: 2023-09-11 | End: 2023-09-11 | Stop reason: SURG

## 2023-09-11 RX ORDER — SODIUM CHLORIDE, SODIUM LACTATE, POTASSIUM CHLORIDE, CALCIUM CHLORIDE 600; 310; 30; 20 MG/100ML; MG/100ML; MG/100ML; MG/100ML
30 INJECTION, SOLUTION INTRAVENOUS CONTINUOUS
Status: DISCONTINUED | OUTPATIENT
Start: 2023-09-11 | End: 2023-09-11 | Stop reason: HOSPADM

## 2023-09-11 RX ORDER — PRUCALOPRIDE 2 MG/1
2 TABLET, FILM COATED ORAL DAILY
COMMUNITY

## 2023-09-11 RX ORDER — PROPOFOL 10 MG/ML
VIAL (ML) INTRAVENOUS AS NEEDED
Status: DISCONTINUED | OUTPATIENT
Start: 2023-09-11 | End: 2023-09-11 | Stop reason: SURG

## 2023-09-11 RX ADMIN — PROPOFOL 150 MCG/KG/MIN: 10 INJECTION, EMULSION INTRAVENOUS at 08:42

## 2023-09-11 RX ADMIN — PROPOFOL 50 MG: 10 INJECTION, EMULSION INTRAVENOUS at 08:49

## 2023-09-11 RX ADMIN — PROPOFOL 100 MG: 10 INJECTION, EMULSION INTRAVENOUS at 08:42

## 2023-09-11 RX ADMIN — LIDOCAINE HYDROCHLORIDE 40 MG: 20 INJECTION, SOLUTION EPIDURAL; INFILTRATION; INTRACAUDAL; PERINEURAL at 08:42

## 2023-09-11 RX ADMIN — SODIUM CHLORIDE, POTASSIUM CHLORIDE, SODIUM LACTATE AND CALCIUM CHLORIDE: 600; 310; 30; 20 INJECTION, SOLUTION INTRAVENOUS at 08:42

## 2023-09-11 RX ADMIN — SODIUM CHLORIDE, POTASSIUM CHLORIDE, SODIUM LACTATE AND CALCIUM CHLORIDE 30 ML/HR: 600; 310; 30; 20 INJECTION, SOLUTION INTRAVENOUS at 08:21

## 2023-09-11 NOTE — ANESTHESIA PREPROCEDURE EVALUATION
Anesthesia Evaluation     Patient summary reviewed and Nursing notes reviewed   no history of anesthetic complications:   NPO Solid Status: > 8 hours  NPO Liquid Status: > 2 hours           Airway   Mallampati: II  TM distance: >3 FB  Neck ROM: full  No difficulty expected  Dental    (+) upper dentures and edentulous    Pulmonary - normal exam    breath sounds clear to auscultation  (+) a smoker Former,  Cardiovascular - normal exam  Exercise tolerance: good (4-7 METS)    Rhythm: regular  Rate: normal    (+) hypertension, past MI (2015) , CAD, hyperlipidemia      Neuro/Psych  (+) numbness, psychiatric history  GI/Hepatic/Renal/Endo    (+) hiatal hernia, GERD, thyroid problem hypothyroidism    ROS Comment: Gastroparesis, esophageal dyskinesia    Musculoskeletal     Abdominal    Substance History - negative use     OB/GYN negative ob/gyn ROS         Other   arthritis,     ROS/Med Hx Other: PAT Nursing Notes unavailable.                    Anesthesia Plan    ASA 3     general     (Total IV Anesthesia    Patient understands anesthesia not responsible for dental damage.  )  intravenous induction     Anesthetic plan, risks, benefits, and alternatives have been provided, discussed and informed consent has been obtained with: patient.    Plan discussed with CRNA.      CODE STATUS:

## 2023-09-11 NOTE — ANESTHESIA POSTPROCEDURE EVALUATION
Patient: Addie Yates    Procedure Summary       Date: 09/11/23 Room / Location: [facility] ENDOSCOPY 3 / [facility] END[facility]COPY    Anesthesia Start: 0840 Anesthesia Stop: 0913    Procedure: COLONOSCOPY WITH POLYPECTOMY AND BIOPSIES Diagnosis:       Screening for colorectal cancer      (Screening for colorectal cancer [Z12.11, Z12.12])    Surgeons: Yodit Leroy MD Provider: Umu Chung CRNA    Anesthesia Type: general ASA Status: 3            Anesthesia Type: general    Vitals  Vitals Value Taken Time   /73 09/11/23 0913   Temp     Pulse 69 09/11/23 0914   Resp     SpO2 97 % 09/11/23 0914   Vitals shown include unvalidated device data.        Post Anesthesia Care and Evaluation    Patient location during evaluation: bedside  Patient participation: complete - patient participated  Level of consciousness: awake and alert  Pain score: 0    Airway patency: patent  Anesthetic complications: No anesthetic complications  PONV Status: none  Cardiovascular status: acceptable  Respiratory status: acceptable  Hydration status: acceptable  No anesthesia care post op

## 2023-09-11 NOTE — H&P
Pre Procedure History & Physical    Chief Complaint:   constipation    Subjective     HPI:   64 yo F with history of high grade squamous intraepithelial lesion (AIN-3) who presents for f/u of constipation.    Past Medical History:   Past Medical History:   Diagnosis Date    Anxiety disorder, unspecified     Atherosclerotic heart disease of native coronary artery without angina pectoris     Carpal tunnel syndrome on right     Chronic pain     BASE OF SKULL/UPPERMOST NECK    Constipation, unspecified     DDD (degenerative disc disease), cervical     DDD (degenerative disc disease), lumbar     Diaphragmatic hernia without obstruction and without gangrene     Dyskinesia of esophagus     Gastroparesis     GERD (gastroesophageal reflux disease)     Glaucoma     Hiatal hernia     Hypothyroidism, unspecified     Long term (current) use of opiate analgesic     Medial epicondylitis     MI (myocardial infarction)     2015    Osteoarthritis     Osteoporosis without current pathological fracture     Other idiopathic scoliosis, site unspecified     Presence of intraocular lens     Primary generalized (osteo)arthritis     Right lateral epicondylitis     Toxic goiter     Unspecified hemorrhoids        Past Surgical History:  Past Surgical History:   Procedure Laterality Date    CARPAL TUNNEL RELEASE Left     COLONOSCOPY      2007, 2010, 2015 - WITH ADENOMA    CORONARY ANGIOPLASTY WITH STENT PLACEMENT  11/2015    ENDOSCOPY      2008, 2016    ENDOSCOPY N/A 11/04/2022    Procedure: ESOPHAGOGASTRODUODENOSCOPY with biopsy;  Surgeon: Yodit Leroy MD;  Location: [facility] ENDOSCOPY;  Service: Gastroenterology;  Laterality: N/A;  gastritis, hiatal hernia    FOOT SURGERY Right 05/15/2023    HYSTERECTOMY  2004    LAPAROSCOPIC CHOLECYSTECTOMY  2017    REFRACTIVE SURGERY Bilateral     THYROID BIOPSY Right     TUBAL ABDOMINAL LIGATION      BI-TUBAL    UPPER GASTROINTESTINAL ENDOSCOPY         Family History:  Family History   Problem  Relation Age of Onset    Leukemia Other     Kidney cancer Other     Diabetes Other     Malig Hyperthermia Neg Hx        Social History:   reports that she quit smoking about 18 years ago. Her smoking use included cigarettes. She has never used smokeless tobacco. She reports that she does not currently use alcohol. She reports that she does not use drugs.    Medications:   Facility-Administered Medications Prior to Admission   Medication Dose Route Frequency Provider Last Rate Last Admin    denosumab (PROLIA) syringe 60 mg  60 mg Subcutaneous Q6 Months Jammie Phillips MD   60 mg at 08/16/23 0926     Medications Prior to Admission   Medication Sig Dispense Refill Last Dose    aspirin 81 MG EC tablet Take 1 tablet by mouth Every Night.   Past Week    atorvastatin (LIPITOR) 80 MG tablet Take 1 tablet by mouth Daily.   9/10/2023    busPIRone (BUSPAR) 5 MG tablet Take 1 tablet by mouth 2 (Two) Times a Day. 180 tablet 1 9/10/2023    DULoxetine (CYMBALTA) 60 MG capsule Take 1 capsule by mouth Daily. 90 capsule 1 9/10/2023    ketoconazole (NIZORAL) 2 % shampoo SHAMPOO SCALP 2-3 TIMES WEEKLY AS NEEDED. ALTERNATE WITH OTHER SHAMPOO   9/10/2023    levothyroxine (SYNTHROID, LEVOTHROID) 100 MCG tablet Take 1 tablet by mouth Daily. 90 tablet 1 9/10/2023    lisinopril (PRINIVIL,ZESTRIL) 5 MG tablet Take 1 tablet by mouth Every Night.   Past Week    meloxicam (MOBIC) 15 MG tablet Take 1 tablet by mouth Daily.   9/10/2023    omeprazole (priLOSEC) 40 MG capsule TAKE ONE CAPSULE BY MOUTH EVERY DAY 90 capsule 1 9/10/2023    traMADol (ULTRAM) 50 MG tablet Take 1 tablet by mouth 2 (Two) Times a Day As Needed for Moderate Pain. 60 tablet 2 9/10/2023    Calcium Citrate-Vitamin D3 (CITRACAL) 315-6.25 MG-MCG tablet tablet Take  by mouth Daily.   Unknown    docusate sodium (COLACE) 100 MG capsule Take 1 capsule by mouth 2 (Two) Times a Day.       Prolia 60 MG/ML solution prefilled syringe syringe INJECT 60MG SUBCUTANEOUSLY EVERY 6  MONTHS 1 mL 0     Prucalopride Succinate (Motegrity) 2 MG tablet Take 1 tablet by mouth Daily.       simethicone (MYLICON,GAS-X) 125 MG capsule capsule Take 1 capsule by mouth As Needed.          Allergies:  Patient has no known allergies.    ROS:    Pertinent items are noted in HPI     Objective     Blood pressure 135/97, pulse 89, temperature 98.2 °F (36.8 °C), temperature source Temporal, resp. rate 16, weight 66 kg (145 lb 8.1 oz), SpO2 94 %.    Physical Exam   Constitutional: Pt is oriented to person, place, and time and well-developed, well-nourished, and in no distress.   Mouth/Throat: Oropharynx is clear and moist.   Neck: Normal range of motion.   Cardiovascular: Normal rate, regular rhythm and normal heart sounds.    Pulmonary/Chest: Effort normal and breath sounds normal.   Abdominal: Soft. Nontender  Skin: Skin is warm and dry.   Psychiatric: Mood, memory, affect and judgment normal.     Assessment & Plan     Diagnosis:  Constipation    Anticipated Surgical Procedure:  Colonoscopy    The risks, benefits, and alternatives of this procedure have been discussed with the patient or the responsible party- the patient understands and agrees to proceed.

## 2023-09-13 LAB
CYTO UR: NORMAL
LAB AP CASE REPORT: NORMAL
LAB AP CLINICAL INFORMATION: NORMAL
LAB AP SPECIAL STAINS: NORMAL
PATH REPORT.FINAL DX SPEC: NORMAL
PATH REPORT.GROSS SPEC: NORMAL

## 2023-09-14 ENCOUNTER — TELEPHONE (OUTPATIENT)
Dept: GASTROENTEROLOGY | Facility: CLINIC | Age: 63
End: 2023-09-14
Payer: MEDICARE

## 2023-09-14 DIAGNOSIS — K62.9 ANAL LESION: Primary | ICD-10-CM

## 2023-09-25 DIAGNOSIS — M15.9 GENERALIZED OSTEOARTHRITIS: ICD-10-CM

## 2023-09-26 RX ORDER — TRAMADOL HYDROCHLORIDE 50 MG/1
50 TABLET ORAL 2 TIMES DAILY PRN
Qty: 60 TABLET | Refills: 2 | Status: SHIPPED | OUTPATIENT
Start: 2023-09-26

## 2023-10-25 ENCOUNTER — EXTERNAL PBMM DATA (OUTPATIENT)
Dept: PHARMACY | Facility: OTHER | Age: 63
End: 2023-10-25
Payer: MEDICARE

## 2023-11-08 ENCOUNTER — TELEPHONE (OUTPATIENT)
Dept: FAMILY MEDICINE CLINIC | Age: 63
End: 2023-11-08
Payer: MEDICARE

## 2023-11-08 DIAGNOSIS — Z12.31 ENCOUNTER FOR SCREENING MAMMOGRAM FOR MALIGNANT NEOPLASM OF BREAST: Primary | ICD-10-CM

## 2023-11-08 NOTE — TELEPHONE ENCOUNTER
----- Message from Jammie Phillips MD sent at 11/8/2022  4:41 PM EST -----  Regarding: f/u screening mammo  Please call pt to let her know that she is due for her yearly screening mammogram.  Please pend order for screening mammo and send to me.  Thanks, MASON

## 2023-11-15 ENCOUNTER — POP HEALTH PHARMACY (OUTPATIENT)
Dept: PHARMACY | Facility: OTHER | Age: 63
End: 2023-11-15
Payer: MEDICARE

## 2023-12-07 ENCOUNTER — OFFICE VISIT (OUTPATIENT)
Dept: FAMILY MEDICINE CLINIC | Age: 63
End: 2023-12-07
Payer: MEDICARE

## 2023-12-07 ENCOUNTER — LAB (OUTPATIENT)
Dept: LAB | Facility: HOSPITAL | Age: 63
End: 2023-12-07
Payer: MEDICARE

## 2023-12-07 VITALS
OXYGEN SATURATION: 94 % | DIASTOLIC BLOOD PRESSURE: 87 MMHG | HEIGHT: 64 IN | WEIGHT: 142.2 LBS | SYSTOLIC BLOOD PRESSURE: 130 MMHG | BODY MASS INDEX: 24.28 KG/M2 | HEART RATE: 71 BPM

## 2023-12-07 DIAGNOSIS — Z23 ENCOUNTER FOR IMMUNIZATION: ICD-10-CM

## 2023-12-07 DIAGNOSIS — K21.9 GASTROESOPHAGEAL REFLUX DISEASE WITHOUT ESOPHAGITIS: ICD-10-CM

## 2023-12-07 DIAGNOSIS — I10 ESSENTIAL HYPERTENSION: ICD-10-CM

## 2023-12-07 DIAGNOSIS — M25.532 LEFT WRIST PAIN: ICD-10-CM

## 2023-12-07 DIAGNOSIS — E03.9 ACQUIRED HYPOTHYROIDISM: ICD-10-CM

## 2023-12-07 DIAGNOSIS — F41.1 GENERALIZED ANXIETY DISORDER: ICD-10-CM

## 2023-12-07 DIAGNOSIS — M81.0 AGE-RELATED OSTEOPOROSIS WITHOUT CURRENT PATHOLOGICAL FRACTURE: ICD-10-CM

## 2023-12-07 DIAGNOSIS — Z00.00 ANNUAL PHYSICAL EXAM: Primary | ICD-10-CM

## 2023-12-07 DIAGNOSIS — E78.2 MIXED HYPERLIPIDEMIA: ICD-10-CM

## 2023-12-07 DIAGNOSIS — M15.9 GENERALIZED OSTEOARTHRITIS: ICD-10-CM

## 2023-12-07 DIAGNOSIS — I25.10 CORONARY ARTERY DISEASE INVOLVING NATIVE CORONARY ARTERY OF NATIVE HEART WITHOUT ANGINA PECTORIS: ICD-10-CM

## 2023-12-07 DIAGNOSIS — R07.9 CHEST PAIN, UNSPECIFIED TYPE: ICD-10-CM

## 2023-12-07 DIAGNOSIS — Z79.899 HIGH RISK MEDICATION USE: ICD-10-CM

## 2023-12-07 PROBLEM — Z12.11 SCREENING FOR COLORECTAL CANCER: Status: RESOLVED | Noted: 2023-04-12 | Resolved: 2023-12-07

## 2023-12-07 PROBLEM — Z12.12 SCREENING FOR COLORECTAL CANCER: Status: RESOLVED | Noted: 2023-04-12 | Resolved: 2023-12-07

## 2023-12-07 PROBLEM — R10.13 EPIGASTRIC PAIN: Status: RESOLVED | Noted: 2022-06-28 | Resolved: 2023-12-07

## 2023-12-07 LAB
ALBUMIN SERPL-MCNC: 4.7 G/DL (ref 3.5–5.2)
ALBUMIN/GLOB SERPL: 2 G/DL
ALP SERPL-CCNC: 102 U/L (ref 39–117)
ALT SERPL W P-5'-P-CCNC: 26 U/L (ref 1–33)
AMPHET+METHAMPHET UR QL: NEGATIVE
AMPHETAMINES UR QL: NEGATIVE
ANION GAP SERPL CALCULATED.3IONS-SCNC: 8 MMOL/L (ref 5–15)
AST SERPL-CCNC: 26 U/L (ref 1–32)
BARBITURATES UR QL SCN: NEGATIVE
BASOPHILS # BLD AUTO: 0.04 10*3/MM3 (ref 0–0.2)
BASOPHILS NFR BLD AUTO: 0.6 % (ref 0–1.5)
BENZODIAZ UR QL SCN: NEGATIVE
BILIRUB SERPL-MCNC: 0.3 MG/DL (ref 0–1.2)
BUN SERPL-MCNC: 19 MG/DL (ref 8–23)
BUN/CREAT SERPL: 21.1 (ref 7–25)
BUPRENORPHINE SERPL-MCNC: NEGATIVE NG/ML
CALCIUM SPEC-SCNC: 9.6 MG/DL (ref 8.6–10.5)
CANNABINOIDS SERPL QL: NEGATIVE
CHLORIDE SERPL-SCNC: 106 MMOL/L (ref 98–107)
CHOLEST SERPL-MCNC: 146 MG/DL (ref 0–200)
CO2 SERPL-SCNC: 28 MMOL/L (ref 22–29)
COCAINE UR QL: NEGATIVE
CREAT SERPL-MCNC: 0.9 MG/DL (ref 0.57–1)
DEPRECATED RDW RBC AUTO: 47.2 FL (ref 37–54)
EGFRCR SERPLBLD CKD-EPI 2021: 71.5 ML/MIN/1.73
EOSINOPHIL # BLD AUTO: 0.09 10*3/MM3 (ref 0–0.4)
EOSINOPHIL NFR BLD AUTO: 1.4 % (ref 0.3–6.2)
ERYTHROCYTE [DISTWIDTH] IN BLOOD BY AUTOMATED COUNT: 13.5 % (ref 12.3–15.4)
EXPIRATION DATE: NORMAL
GLOBULIN UR ELPH-MCNC: 2.3 GM/DL
GLUCOSE SERPL-MCNC: 80 MG/DL (ref 65–99)
HCT VFR BLD AUTO: 43.7 % (ref 34–46.6)
HDLC SERPL-MCNC: 63 MG/DL (ref 40–60)
HGB BLD-MCNC: 13.9 G/DL (ref 12–15.9)
IMM GRANULOCYTES # BLD AUTO: 0.01 10*3/MM3 (ref 0–0.05)
IMM GRANULOCYTES NFR BLD AUTO: 0.2 % (ref 0–0.5)
LDLC SERPL CALC-MCNC: 73 MG/DL (ref 0–100)
LDLC/HDLC SERPL: 1.17 {RATIO}
LYMPHOCYTES # BLD AUTO: 1.48 10*3/MM3 (ref 0.7–3.1)
LYMPHOCYTES NFR BLD AUTO: 23.6 % (ref 19.6–45.3)
Lab: NORMAL
MAGNESIUM SERPL-MCNC: 2.2 MG/DL (ref 1.6–2.4)
MCH RBC QN AUTO: 29.8 PG (ref 26.6–33)
MCHC RBC AUTO-ENTMCNC: 31.8 G/DL (ref 31.5–35.7)
MCV RBC AUTO: 93.8 FL (ref 79–97)
MDMA UR QL SCN: NEGATIVE
METHADONE UR QL SCN: NEGATIVE
MONOCYTES # BLD AUTO: 0.33 10*3/MM3 (ref 0.1–0.9)
MONOCYTES NFR BLD AUTO: 5.3 % (ref 5–12)
MORPHINE UR QL SCN: NEGATIVE
NEUTROPHILS NFR BLD AUTO: 4.31 10*3/MM3 (ref 1.7–7)
NEUTROPHILS NFR BLD AUTO: 68.9 % (ref 42.7–76)
OXYCODONE UR QL SCN: NEGATIVE
PCP UR QL SCN: NEGATIVE
PLATELET # BLD AUTO: 235 10*3/MM3 (ref 140–450)
PMV BLD AUTO: 10 FL (ref 6–12)
POTASSIUM SERPL-SCNC: 4.3 MMOL/L (ref 3.5–5.2)
PROT SERPL-MCNC: 7 G/DL (ref 6–8.5)
RBC # BLD AUTO: 4.66 10*6/MM3 (ref 3.77–5.28)
SODIUM SERPL-SCNC: 142 MMOL/L (ref 136–145)
TRIGL SERPL-MCNC: 46 MG/DL (ref 0–150)
TSH SERPL DL<=0.05 MIU/L-ACNC: 1.69 UIU/ML (ref 0.27–4.2)
VLDLC SERPL-MCNC: 10 MG/DL (ref 5–40)
WBC NRBC COR # BLD AUTO: 6.26 10*3/MM3 (ref 3.4–10.8)

## 2023-12-07 PROCEDURE — 80053 COMPREHEN METABOLIC PANEL: CPT

## 2023-12-07 PROCEDURE — 84443 ASSAY THYROID STIM HORMONE: CPT

## 2023-12-07 PROCEDURE — 83735 ASSAY OF MAGNESIUM: CPT

## 2023-12-07 PROCEDURE — 85025 COMPLETE CBC W/AUTO DIFF WBC: CPT

## 2023-12-07 PROCEDURE — 80061 LIPID PANEL: CPT

## 2023-12-07 PROCEDURE — 36415 COLL VENOUS BLD VENIPUNCTURE: CPT

## 2023-12-07 RX ORDER — DULOXETIN HYDROCHLORIDE 60 MG/1
60 CAPSULE, DELAYED RELEASE ORAL DAILY
Qty: 90 CAPSULE | Refills: 1 | Status: SHIPPED | OUTPATIENT
Start: 2023-12-07

## 2023-12-07 RX ORDER — BUSPIRONE HYDROCHLORIDE 5 MG/1
5 TABLET ORAL 2 TIMES DAILY
Qty: 180 TABLET | Refills: 1 | Status: SHIPPED | OUTPATIENT
Start: 2023-12-07

## 2023-12-07 RX ORDER — NITROGLYCERIN 0.4 MG/1
0.4 TABLET SUBLINGUAL
Qty: 20 TABLET | Refills: 0 | Status: SHIPPED | OUTPATIENT
Start: 2023-12-07

## 2023-12-07 NOTE — PROGRESS NOTES
The ABCs of the Annual Wellness Visit  Subsequent Medicare Wellness Visit    Cecy Yates is a 63 y.o. female who presents for a Subsequent Medicare Wellness Visit.    She is UTD on colonoscopy, last done 9/2023.  One year repeat recommended d/t h/o HSIL lesion.  She is following with Gastro now.  Pap smear is no longer indicated by history; s/p hysterectomy.  She is UTD on mammogram, last done 11/2022 and this was normal; repeat scheduled for 12/2023.  She is UTD on DEXA, last done 2/2023 and this showed osteopenia.  She is on Prolia.  She is UTD on Tdap (5/2016).  She is due for COVID, Shingrix and flu.  She is due for routine labs including BMP and TSH.     The following portions of the patient's history were reviewed and   updated as appropriate: allergies, current medications, past family history, past medical history, past social history, past surgical history, and problem list.    Compared to one year ago, the patient feels her physical   health is the same.    Compared to one year ago, the patient feels her mental   health is the same.    Recent Hospitalizations:  She was not admitted to the hospital during the last year.       Current Medical Providers:  Patient Care Team:  Jammie Phillips MD as PCP - General (Family Medicine)  Wayne Daniels APRN as Nurse Practitioner (Cardiology)  Gorge Reed DO (Orthopedic Surgery)  Ivy Simon MD (Pain Medicine)  Loren Caldwell APRN as Nurse Practitioner (Gastroenterology)  Yodit Leroy MD as Consulting Physician (Gastroenterology)    Outpatient Medications Prior to Visit   Medication Sig Dispense Refill    aspirin 81 MG EC tablet Take 1 tablet by mouth Every Night.      atorvastatin (LIPITOR) 80 MG tablet Take 1 tablet by mouth Daily.      Calcium Citrate-Vitamin D3 (CITRACAL) 315-6.25 MG-MCG tablet tablet Take  by mouth Daily.      docusate sodium (COLACE) 100 MG capsule Take 1 capsule by mouth 2 (Two) Times a Day.       ketoconazole (NIZORAL) 2 % shampoo SHAMPOO SCALP 2-3 TIMES WEEKLY AS NEEDED. ALTERNATE WITH OTHER SHAMPOO      levothyroxine (SYNTHROID, LEVOTHROID) 100 MCG tablet Take 1 tablet by mouth Daily. 90 tablet 1    lisinopril (PRINIVIL,ZESTRIL) 5 MG tablet Take 1 tablet by mouth Every Night.      meloxicam (MOBIC) 15 MG tablet Take 1 tablet by mouth Daily.      omeprazole (priLOSEC) 40 MG capsule TAKE ONE CAPSULE BY MOUTH EVERY DAY 90 capsule 1    Prolia 60 MG/ML solution prefilled syringe syringe INJECT 60MG SUBCUTANEOUSLY EVERY 6 MONTHS 1 mL 0    Prucalopride Succinate (Motegrity) 2 MG tablet Take 1 tablet by mouth Daily.      simethicone (MYLICON,GAS-X) 125 MG capsule capsule Take 1 capsule by mouth As Needed.      traMADol (ULTRAM) 50 MG tablet Take 1 tablet by mouth 2 (Two) Times a Day As Needed for Moderate Pain. 60 tablet 2    busPIRone (BUSPAR) 5 MG tablet Take 1 tablet by mouth 2 (Two) Times a Day. 180 tablet 1    DULoxetine (CYMBALTA) 60 MG capsule Take 1 capsule by mouth Daily. 90 capsule 1     Facility-Administered Medications Prior to Visit   Medication Dose Route Frequency Provider Last Rate Last Admin    denosumab (PROLIA) syringe 60 mg  60 mg Subcutaneous Q6 Months Jammie Phillips MD   60 mg at 08/16/23 0926       Opioid medication/s are on active medication list.  and I have evaluated her active treatment plan and pain score trends (see table).  There were no vitals filed for this visit.  I have reviewed the chart for potential of high risk medication and harmful drug interactions in the elderly.          Aspirin is on active medication list. Aspirin use is indicated based on review of current medical condition/s. Pros and cons of this therapy have been discussed today. Benefits of this medication outweigh potential harm.  Patient has been encouraged to continue taking this medication.  .      Patient Active Problem List   Diagnosis    Coronary artery disease involving native coronary artery  "of native heart without angina pectoris    Gastroesophageal reflux disease without esophagitis    Glaucoma    Essential hypertension    Mixed hyperlipidemia    Hiatal hernia    Irritable bowel syndrome    Acquired hypothyroidism    Generalized osteoarthritis    High risk medication use    Esophageal spasm    Generalized anxiety disorder    Age-related osteoporosis without current pathological fracture    Pain in both lower extremities    Annual physical exam    Chest pain    Left wrist pain     Advance Care Planning   Advance Care Planning     Advance Directive is not on file.  ACP discussion was held with the patient during this visit. Patient does not have an advance directive, information provided.     Objective    Vitals:    23 0917   BP: 130/87   BP Location: Left arm   Patient Position: Sitting   Cuff Size: Adult   Pulse: 71   SpO2: 94%   Weight: 64.5 kg (142 lb 3.2 oz)   Height: 162.6 cm (64.02\")     Estimated body mass index is 24.4 kg/m² as calculated from the following:    Height as of this encounter: 162.6 cm (64.02\").    Weight as of this encounter: 64.5 kg (142 lb 3.2 oz).    BMI is within normal parameters. No other follow-up for BMI required.      Does the patient have evidence of cognitive impairment? No          HEALTH RISK ASSESSMENT    Smoking Status:  Social History     Tobacco Use   Smoking Status Former    Types: Cigarettes    Quit date: 2004    Years since quittin.1   Smokeless Tobacco Never     Alcohol Consumption:  Social History     Substance and Sexual Activity   Alcohol Use Not Currently     Fall Risk Screen:    STEADI Fall Risk Assessment was completed, and patient is at LOW risk for falls.Assessment completed on:2023    Depression Screenin/7/2023     9:19 AM   PHQ-2/PHQ-9 Depression Screening   Little Interest or Pleasure in Doing Things 0-->not at all   Feeling Down, Depressed or Hopeless 0-->not at all   PHQ-9: Brief Depression Severity Measure Score 0 "       Health Habits and Functional and Cognitive Screenin/7/2023     9:19 AM   Functional & Cognitive Status   Do you have difficulty preparing food and eating? No   Do you have difficulty bathing yourself, getting dressed or grooming yourself? No   Do you have difficulty using the toilet? No   Do you have difficulty moving around from place to place? No   Do you have trouble with steps or getting out of a bed or a chair? No   Current Diet Well Balanced Diet   Dental Exam Up to date   Eye Exam Up to date   Exercise (times per week) 7 times per week   Current Exercises Include Walking   Do you need help using the phone?  No   Are you deaf or do you have serious difficulty hearing?  No   Do you need help to go to places out of walking distance? No   Do you need help shopping? No   Do you need help preparing meals?  No   Do you need help with housework?  No   Do you need help with laundry? No   Do you need help taking your medications? No   Do you need help managing money? No   Do you ever drive or ride in a car without wearing a seat belt? No   Have you felt unusual stress, anger or loneliness in the last month? No   Who do you live with? Spouse   If you need help, do you have trouble finding someone available to you? No   Have you been bothered in the last four weeks by sexual problems? No   Do you have difficulty concentrating, remembering or making decisions? Yes       Age-appropriate Screening Schedule:  Refer to the list below for future screening recommendations based on patient's age, sex and/or medical conditions. Orders for these recommended tests are listed in the plan section. The patient has been provided with a written plan.    Health Maintenance   Topic Date Due    ZOSTER VACCINE (1 of 2) Never done    INFLUENZA VACCINE  2023    COVID-19 Vaccine ( season) 2023    LIPID PANEL  2024    COLORECTAL CANCER SCREENING  2024    MAMMOGRAM  2024    ANNUAL WELLNESS  "VISIT  12/07/2024    DXA SCAN  02/22/2025    TDAP/TD VACCINES (4 - Td or Tdap) 08/16/2033    HEPATITIS C SCREENING  Completed    Pneumococcal Vaccine 0-64  Aged Out                  CMS Preventative Services Quick Reference  Risk Factors Identified During Encounter  Immunizations Discussed/Encouraged: Influenza, Shingrix, and COVID19  The above risks/problems have been discussed with the patient.  Pertinent information has been shared with the patient in the After Visit Summary.  An After Visit Summary and PPPS were made available to the patient.    Follow Up:   Next Medicare Wellness visit to be scheduled in 1 year.       Additional E&M Note during same encounter follows:  Patient has multiple medical problems which are significant and separately identifiable that require additional work above and beyond the Medicare Wellness Visit.      Chief Complaint  Medicare Wellness-subsequent    Subjective        HPI  Addie Yates is also being seen today for routine f/u of chronic issues.    She is on lisinopril for hypertension.  BP has been well controlled.  Denies chest pain, palpitations or shortness of breath.  She is on atorvastatin for hyperlipidemia and CAD.  Denies myalgias.  She is on aspirin for CAD.  S/p stenting in 11/2015.  She is following with Cardiology Wayne LUND.  She is complaining of epigastric pain radiating up into the mid-sternum intermittently for the past 4 days.  She does feel some pounding heart today and \"like I'm taking deeper breaths.\"  Sometimes feels weak and shaky when she is up exerting herself.  +Nausea, no vomiting. Takes about 10 minutes to resolve.      She is on tramadol and duloxetine for chronic neck and back pain d/t diffuse OA and scoliosis.  No none adverse effects.  Gabapentin was ineffective.  In the past, she has followed with Physical Therapy, chiropractor, and [facility] for injections but of these gave her much relief.      She is on duloxetine and " "buspirone for anxiety.  Mood has been \"better.\"  No depressed mood or anhedonia, no anxiety or panic attacks.     She is on levothyroxine for hypothyroidism.  Denies heat/cold intolerance, changes in skin or hair.     She is on omeprazole for GERD and esophageal spasm.  Most recent EGD by Dr. Leroy showed hiatal hernia and gastritis.  Gastric emptying study has shown gastroparesis.  She is on Motegrity, docusate, and lactulose for constipation.  She has been diagnosed with high grade dysplasia of the rectum but Colorectal has already signed off on that.  Following with Thuy Caldwell and Dr. Leroy GI.      She is on Prolia for osteoporosis.  She is up-to-date on DEXA, last done 2/2023 and this showed osteopenia.     Review of Systems   Constitutional:  Positive for fatigue. Negative for chills and fever.   HENT:  Negative for congestion, hearing loss and rhinorrhea.    Eyes:  Negative for pain and visual disturbance.   Respiratory:  Negative for cough and shortness of breath.    Cardiovascular:  Negative for chest pain and palpitations.   Gastrointestinal:  Negative for abdominal pain, constipation, diarrhea, nausea and vomiting.   Genitourinary:  Negative for difficulty urinating and dysuria.   Musculoskeletal:  Positive for arthralgias and back pain. Negative for myalgias.   Neurological:  Negative for weakness and numbness.   Psychiatric/Behavioral:  Negative for dysphoric mood and sleep disturbance. The patient is not nervous/anxious.        Objective   Vital Signs:  /87 (BP Location: Left arm, Patient Position: Sitting, Cuff Size: Adult)   Pulse 71   Ht 162.6 cm (64.02\")   Wt 64.5 kg (142 lb 3.2 oz)   SpO2 94%   BMI 24.40 kg/m²     Physical Exam  Vitals reviewed.   Constitutional:       General: She is not in acute distress.     Appearance: Normal appearance. She is well-developed.   HENT:      Head: Normocephalic and atraumatic.      Right Ear: External ear normal.      Left Ear: External ear " normal.      Mouth/Throat:      Mouth: Mucous membranes are moist.   Eyes:      Extraocular Movements: Extraocular movements intact.      Conjunctiva/sclera: Conjunctivae normal.      Pupils: Pupils are equal, round, and reactive to light.   Cardiovascular:      Rate and Rhythm: Normal rate and regular rhythm.      Heart sounds: No murmur heard.  Pulmonary:      Effort: Pulmonary effort is normal.      Breath sounds: Normal breath sounds. No wheezing, rhonchi or rales.   Abdominal:      General: Bowel sounds are normal. There is no distension.      Palpations: Abdomen is soft.      Tenderness: There is no abdominal tenderness.   Musculoskeletal:         General: Tenderness (bilateral cervical and lumbar paraspinals) present. Normal range of motion.   Skin:     General: Skin is warm and dry.   Neurological:      Mental Status: She is alert and oriented to person, place, and time.      Deep Tendon Reflexes: Reflexes normal.   Psychiatric:         Mood and Affect: Mood and affect normal.         Behavior: Behavior normal.         Thought Content: Thought content normal.         Judgment: Judgment normal.                    Lab Results   Component Value Date    GLUCOSE 92 05/12/2023    BUN 20 05/12/2023    CREATININE 0.87 05/12/2023    EGFR 74.5 05/12/2023    BCR 23.0 05/12/2023    K 4.1 05/12/2023    CO2 28.4 05/12/2023    CALCIUM 9.7 05/12/2023    ALBUMIN 4.2 05/12/2023    BILITOT 0.4 05/12/2023    AST 24 05/12/2023    ALT 18 05/12/2023       Lab Results   Component Value Date    CHOL 146 05/12/2023    CHLPL 135 04/15/2021    TRIG 44 05/12/2023    HDL 68 (H) 05/12/2023    LDL 68 05/12/2023       Lab Results   Component Value Date    WBC 6.12 05/12/2023    HGB 13.7 05/12/2023    HCT 42.9 05/12/2023    MCV 95.3 05/12/2023     05/12/2023            Assessment and Plan   Diagnoses and all orders for this visit:    1. Annual physical exam (Primary)  Assessment & Plan:  She is UTD on colonoscopy, last done 9/2023.   One year repeat recommended d/t h/o HSIL lesion.  She is following with Gastro now.  Pap smear is no longer indicated by history; s/p hysterectomy.  She is UTD on mammogram, last done 11/2022 and this was normal; repeat scheduled for 12/2023.  She is UTD on DEXA, last done 2/2023 and this showed osteopenia.  She is on Prolia.  She is UTD on Tdap (5/2016).  She is due for COVID, Shingrix and flu.  COVID and flu given today.  Shingrix can be done at the pharmacy.  She is due for routine labs including CMP, lipids, CBC and TSH.      2. Chest pain, unspecified type  Assessment & Plan:  Chest pain presentation is concerning for unstable angina.  Epigastric pain radiated up into the sternum associated with shortness of breath, palpitations, diaphoresis and nausea brought on by exertion and relieved by rest after about 10 minutes.  I am prescribing her nitroglycerin today.  She does follow with Cardiology and has an appointment with them on the 18th.  We will contact their office and see if they can expedite that.  In the meantime, continue aspirin, statin, and ACE I.  No refills needed today.  Low threshold to go to the ED.  Strict ED precautions were discussed with her today.    Orders:  -     ECG 12 Lead  -     nitroglycerin (Nitrostat) 0.4 MG SL tablet; Place 1 tablet under the tongue Every 5 (Five) Minutes As Needed for Chest Pain. Take no more than 3 doses in 15 minutes.  Dispense: 20 tablet; Refill: 0    3. Coronary artery disease involving native coronary artery of native heart without angina pectoris  Overview:  Follows with Wayne LUND, [facility], [place], [place]  Stable on aspirin, statin, ACE-I.    Orders:  -     nitroglycerin (Nitrostat) 0.4 MG SL tablet; Place 1 tablet under the tongue Every 5 (Five) Minutes As Needed for Chest Pain. Take no more than 3 doses in 15 minutes.  Dispense: 20 tablet; Refill: 0    4. Generalized osteoarthritis  Assessment & Plan:  Stable on current regimen.  Symptoms are  well controlled.  No adverse effects. She does require ongoing use of this controlled substance to function.  Tox screen was due today.  Prior tox screen appropriate.  HAYLEY was run today.  Refills were not needed today.  RTC 3 months.        5. High risk medication use  -     POC 12 Panel Urine Drug Screen    6. Essential hypertension  Assessment & Plan:  Blood pressure has been running at goal.  Continue lisinopril 5 mg daily.  Refills were not needed today.  Labs were needed today.  Continue to monitor.      Orders:  -     Lipid Panel; Future  -     Comprehensive Metabolic Panel; Future  -     CBC Auto Differential; Future  -     Magnesium; Future    7. Mixed hyperlipidemia  Assessment & Plan:  Stable on atorvastatin 80 mg daily.  Refills were not needed today.  Labs were due today.  Continue to monitor.        8. Generalized anxiety disorder  Assessment & Plan:  Stable on buspirone 5 mg twice daily and duloxetine 60 mg daily.  Refills were needed today.  Continue to monitor.      Orders:  -     DULoxetine (CYMBALTA) 60 MG capsule; Take 1 capsule by mouth Daily.  Dispense: 90 capsule; Refill: 1  -     busPIRone (BUSPAR) 5 MG tablet; Take 1 tablet by mouth 2 (Two) Times a Day.  Dispense: 180 tablet; Refill: 1    9. Acquired hypothyroidism  Assessment & Plan:  Stable on levothyroxine 100 mcg daily.  Refills were not needed today.  Labs were due today.  Continue to monitor.      Orders:  -     TSH; Future    10. Gastroesophageal reflux disease without esophagitis  Overview:  Following with [facility].    Assessment & Plan:  Stable on omeprazole 40 mg daily.  Refills were not needed today.  Continue to monitor.  Wean PPI as able.        11. Age-related osteoporosis without current pathological fracture  Overview:  On Prolia.  Last DEXA was done 2/2021 showing improved osteopenia.      12. Left wrist pain  Assessment & Plan:  Hold meloxicam for now (given by Dr. Reed) given cardiac issues.  Rx sent for  topical diclofenac to be used on the wrist instead.    Orders:  -     Diclofenac Sodium (VOLTAREN) 1 % gel gel; Apply 4 g topically to the appropriate area as directed 4 (Four) Times a Day As Needed (arthritis) for up to 30 days.  Dispense: 100 g; Refill: 2    13. Encounter for immunization  -     Fluzone (or Fluarix & Flulaval for VFC) >6mos  -     COVID-19 F23 (Pfizer) 12yrs+ (COMIRNATY)               Follow Up   Return in about 3 months (around 3/7/2024) for Recheck.  Patient was given instructions and counseling regarding her condition or for health maintenance advice. Please see specific information pulled into the AVS if appropriate.

## 2023-12-07 NOTE — PROGRESS NOTES
Procedure     ECG 12 Lead    Date/Time: 12/7/2023 10:08 AM  Performed by: Jammie Phillips MD    Authorized by: Jammie Phillips MD  Rhythm: sinus rhythm  Rate: normal  BPM: 67  Conduction: conduction normal  ST Segments: ST segments normal  T flattening: I, aVL and V3 (biphasic V4-V6)  QRS axis: normal  Other: no other findings    Clinical impression: abnormal EKG  Comments: Anterolateral T wave abnormalities, no ST elevation or depression.  MASON

## 2023-12-07 NOTE — ASSESSMENT & PLAN NOTE
Stable on atorvastatin 80 mg daily.  Refills were not needed today.  Labs were due today.  Continue to monitor.

## 2023-12-07 NOTE — ASSESSMENT & PLAN NOTE
Hold meloxicam for now (given by Dr. Reed) given cardiac issues.  Rx sent for topical diclofenac to be used on the wrist instead.

## 2023-12-07 NOTE — ASSESSMENT & PLAN NOTE
She is UTD on colonoscopy, last done 9/2023.  One year repeat recommended d/t h/o HSIL lesion.  She is following with Gastro now.  Pap smear is no longer indicated by history; s/p hysterectomy.  She is UTD on mammogram, last done 11/2022 and this was normal; repeat scheduled for 12/2023.  She is UTD on DEXA, last done 2/2023 and this showed osteopenia.  She is on Prolia.  She is UTD on Tdap (5/2016).  She is due for COVID, Shingrix and flu.  COVID and flu given today.  Shingrix can be done at the pharmacy.  She is due for routine labs including CMP, lipids, CBC and TSH.

## 2023-12-07 NOTE — ASSESSMENT & PLAN NOTE
Chest pain presentation is concerning for unstable angina.  Epigastric pain radiated up into the sternum associated with shortness of breath, palpitations, diaphoresis and nausea brought on by exertion and relieved by rest after about 10 minutes.  I am prescribing her nitroglycerin today.  She does follow with Cardiology and has an appointment with them on the 18th.  We will contact their office and see if they can expedite that.  In the meantime, continue aspirin, statin, and ACE I.  No refills needed today.  Low threshold to go to the ED.  Strict ED precautions were discussed with her today.

## 2023-12-07 NOTE — ASSESSMENT & PLAN NOTE
Blood pressure has been running at goal.  Continue lisinopril 5 mg daily.  Refills were not needed today.  Labs were needed today.  Continue to monitor.

## 2023-12-07 NOTE — ASSESSMENT & PLAN NOTE
Stable on levothyroxine 100 mcg daily.  Refills were not needed today.  Labs were due today.  Continue to monitor.

## 2023-12-07 NOTE — ASSESSMENT & PLAN NOTE
Stable on buspirone 5 mg twice daily and duloxetine 60 mg daily.  Refills were needed today.  Continue to monitor.

## 2023-12-08 ENCOUNTER — HOSPITAL ENCOUNTER (OUTPATIENT)
Dept: MAMMOGRAPHY | Facility: HOSPITAL | Age: 63
Discharge: HOME | End: 2023-12-08
Admitting: FAMILY MEDICINE
Payer: MEDICARE

## 2023-12-08 DIAGNOSIS — Z12.31 ENCOUNTER FOR SCREENING MAMMOGRAM FOR MALIGNANT NEOPLASM OF BREAST: ICD-10-CM

## 2023-12-08 PROCEDURE — 77063 BREAST TOMOSYNTHESIS BI: CPT

## 2023-12-08 PROCEDURE — 77067 SCR MAMMO BI INCL CAD: CPT

## 2023-12-31 DIAGNOSIS — M15.9 GENERALIZED OSTEOARTHRITIS: ICD-10-CM

## 2023-12-31 DIAGNOSIS — K59.04 CHRONIC IDIOPATHIC CONSTIPATION: ICD-10-CM

## 2024-01-02 RX ORDER — PRUCALOPRIDE 2 MG/1
1 TABLET, FILM COATED ORAL DAILY
Qty: 90 TABLET | Refills: 1 | Status: SHIPPED | OUTPATIENT
Start: 2024-01-02

## 2024-01-02 RX ORDER — TRAMADOL HYDROCHLORIDE 50 MG/1
50 TABLET ORAL 2 TIMES DAILY PRN
Qty: 60 TABLET | Refills: 2 | Status: SHIPPED | OUTPATIENT
Start: 2024-01-02

## 2024-01-02 NOTE — TELEPHONE ENCOUNTER
Patient requesting a refill of Motegrity, order pended.  Last o/v-8/10/23  Last refill-12/4/23  Next o/v-2/21/24

## 2024-01-04 ENCOUNTER — PRIOR AUTHORIZATION (OUTPATIENT)
Dept: FAMILY MEDICINE CLINIC | Age: 64
End: 2024-01-04
Payer: MEDICARE

## 2024-01-04 DIAGNOSIS — E03.9 ACQUIRED HYPOTHYROIDISM: ICD-10-CM

## 2024-01-04 RX ORDER — LEVOTHYROXINE SODIUM 0.1 MG/1
100 TABLET ORAL DAILY
Qty: 90 TABLET | Refills: 1 | Status: SHIPPED | OUTPATIENT
Start: 2024-01-04

## 2024-01-09 DIAGNOSIS — M81.0 AGE-RELATED OSTEOPOROSIS WITHOUT CURRENT PATHOLOGICAL FRACTURE: ICD-10-CM

## 2024-01-09 RX ORDER — DENOSUMAB 60 MG/ML
INJECTION SUBCUTANEOUS
Qty: 1 ML | Refills: 0 | Status: SHIPPED | OUTPATIENT
Start: 2024-01-09

## 2024-01-30 DIAGNOSIS — K21.9 GASTROESOPHAGEAL REFLUX DISEASE WITHOUT ESOPHAGITIS: ICD-10-CM

## 2024-01-30 DIAGNOSIS — K44.9 HIATAL HERNIA: ICD-10-CM

## 2024-01-30 RX ORDER — OMEPRAZOLE 40 MG/1
CAPSULE, DELAYED RELEASE ORAL
Qty: 90 CAPSULE | Refills: 1 | Status: SHIPPED | OUTPATIENT
Start: 2024-01-30

## 2024-02-13 ENCOUNTER — TELEPHONE (OUTPATIENT)
Dept: FAMILY MEDICINE CLINIC | Age: 64
End: 2024-02-13
Payer: MEDICARE

## 2024-02-13 DIAGNOSIS — S92.351A CLOSED DISPLACED FRACTURE OF FIFTH METATARSAL BONE OF RIGHT FOOT, INITIAL ENCOUNTER: Primary | ICD-10-CM

## 2024-02-21 ENCOUNTER — OFFICE VISIT (OUTPATIENT)
Dept: GASTROENTEROLOGY | Facility: CLINIC | Age: 64
End: 2024-02-21
Payer: MEDICARE

## 2024-02-21 ENCOUNTER — TELEPHONE (OUTPATIENT)
Dept: GASTROENTEROLOGY | Facility: CLINIC | Age: 64
End: 2024-02-21
Payer: MEDICARE

## 2024-02-21 VITALS
BODY MASS INDEX: 24.07 KG/M2 | HEIGHT: 64 IN | SYSTOLIC BLOOD PRESSURE: 115 MMHG | WEIGHT: 141 LBS | HEART RATE: 73 BPM | DIASTOLIC BLOOD PRESSURE: 75 MMHG

## 2024-02-21 DIAGNOSIS — K29.50 CHRONIC GASTRITIS WITHOUT BLEEDING, UNSPECIFIED GASTRITIS TYPE: ICD-10-CM

## 2024-02-21 DIAGNOSIS — K44.9 HIATAL HERNIA: ICD-10-CM

## 2024-02-21 DIAGNOSIS — K62.82 ANAL DYSPLASIA: ICD-10-CM

## 2024-02-21 DIAGNOSIS — K58.1 IRRITABLE BOWEL SYNDROME WITH CONSTIPATION: Primary | ICD-10-CM

## 2024-02-21 PROCEDURE — 3078F DIAST BP <80 MM HG: CPT | Performed by: NURSE PRACTITIONER

## 2024-02-21 PROCEDURE — G2211 COMPLEX E/M VISIT ADD ON: HCPCS | Performed by: NURSE PRACTITIONER

## 2024-02-21 PROCEDURE — 99214 OFFICE O/P EST MOD 30 MIN: CPT | Performed by: NURSE PRACTITIONER

## 2024-02-21 PROCEDURE — 3074F SYST BP LT 130 MM HG: CPT | Performed by: NURSE PRACTITIONER

## 2024-02-21 PROCEDURE — 1159F MED LIST DOCD IN RCRD: CPT | Performed by: NURSE PRACTITIONER

## 2024-02-21 PROCEDURE — 1160F RVW MEDS BY RX/DR IN RCRD: CPT | Performed by: NURSE PRACTITIONER

## 2024-02-21 RX ORDER — SACCHAROMYCES BOULARDII 250 MG
250 CAPSULE ORAL DAILY
COMMUNITY

## 2024-02-21 RX ORDER — TENAPANOR HYDROCHLORIDE 53.2 MG/1
50 TABLET ORAL 2 TIMES DAILY
Qty: 60 TABLET | Refills: 3 | Status: SHIPPED | OUTPATIENT
Start: 2024-02-21

## 2024-02-21 NOTE — TELEPHONE ENCOUNTER
----- Message from KEVON Mahan sent at 2/21/2024 10:46 AM EST -----  Regarding: ibsrela PA  Please obtain PA for ibsrela

## 2024-02-21 NOTE — PROGRESS NOTES
Chief Complaint     Constipation    History of Present Illness     Addie Yates is a 63 y.o. female who presents to [facility] for follow-up of IBS-C, gastritis and hiatal hernia.      She is scheduled for repeat surgery on March 29 with Dr. Nguyễn for removal of high grade dysplasia.        She's having a bowel movement every 1-2 weeks.  Reports compliance with motegrity.  She never received ibsrela.  States that she now has new prescription drug insurance and will need a  new PA for medication.       Reflux is controlled with omeprazole.      Reports epigastric pain that's sporadic and when present is there for about 10 minutes.         History      Past Medical History:   Diagnosis Date    Anxiety disorder, unspecified     Atherosclerotic heart disease of native coronary artery without angina pectoris     Carpal tunnel syndrome on right     Chronic pain     BASE OF SKULL/UPPERMOST NECK    Constipation, unspecified     DDD (degenerative disc disease), cervical     DDD (degenerative disc disease), lumbar     Diaphragmatic hernia without obstruction and without gangrene     Dyskinesia of esophagus     Gastroparesis     GERD (gastroesophageal reflux disease)     Glaucoma     Hiatal hernia     Hypothyroidism, unspecified     Long term (current) use of opiate analgesic     Medial epicondylitis     MI (myocardial infarction)     2015    Osteoarthritis     Osteoporosis without current pathological fracture     Other idiopathic scoliosis, site unspecified     Presence of intraocular lens     Primary generalized (osteo)arthritis     Right lateral epicondylitis     Toxic goiter     Unspecified hemorrhoids      Past Surgical History:   Procedure Laterality Date    CARPAL TUNNEL RELEASE Left     COLONOSCOPY      2007, 2010, 2015 - WITH ADENOMA    COLONOSCOPY N/A 9/11/2023    Procedure: COLONOSCOPY WITH POLYPECTOMY AND BIOPSIES;  Surgeon: Yodit Leroy MD;  Location: [facility]  ENDOSCOPY;  Service: Gastroenterology;  Laterality: N/A;  COLON POLYP    CORONARY ANGIOPLASTY WITH STENT PLACEMENT  11/2015    ENDOSCOPY      2008, 2016    ENDOSCOPY N/A 11/04/2022    Procedure: ESOPHAGOGASTRODUODENOSCOPY with biopsy;  Surgeon: Yodit Leroy MD;  Location: [facility] ENDOSCOPY;  Service: Gastroenterology;  Laterality: N/A;  gastritis, hiatal hernia    FOOT SURGERY Right 05/15/2023    HYSTERECTOMY  2004    LAPAROSCOPIC CHOLECYSTECTOMY  2017    REFRACTIVE SURGERY Bilateral     THYROID BIOPSY Right     TUBAL ABDOMINAL LIGATION      BI-TUBAL    UPPER GASTROINTESTINAL ENDOSCOPY       Family History   Problem Relation Age of Onset    Leukemia Other     Kidney cancer Other     Diabetes Other     Malig Hyperthermia Neg Hx         Current Medications       Current Outpatient Medications:     aspirin 81 MG EC tablet, Take 1 tablet by mouth Every Night., Disp: , Rfl:     atorvastatin (LIPITOR) 80 MG tablet, Take 1 tablet by mouth Daily., Disp: , Rfl:     busPIRone (BUSPAR) 5 MG tablet, Take 1 tablet by mouth 2 (Two) Times a Day., Disp: 180 tablet, Rfl: 1    Calcium Citrate-Vitamin D3 (CITRACAL) 315-6.25 MG-MCG tablet tablet, Take  by mouth Daily., Disp: , Rfl:     docusate sodium (COLACE) 100 MG capsule, Take 1 capsule by mouth 2 (Two) Times a Day., Disp: , Rfl:     DULoxetine (CYMBALTA) 60 MG capsule, Take 1 capsule by mouth Daily., Disp: 90 capsule, Rfl: 1    ketoconazole (NIZORAL) 2 % shampoo, SHAMPOO SCALP 2-3 TIMES WEEKLY AS NEEDED. ALTERNATE WITH OTHER SHAMPOO, Disp: , Rfl:     levothyroxine (SYNTHROID, LEVOTHROID) 100 MCG tablet, TAKE ONE TABLET BY MOUTH EVERY DAY, Disp: 90 tablet, Rfl: 1    lisinopril (PRINIVIL,ZESTRIL) 5 MG tablet, Take 1 tablet by mouth Every Night., Disp: , Rfl:     meloxicam (MOBIC) 15 MG tablet, Take 1 tablet by mouth Daily., Disp: , Rfl:     Motegrity 2 MG tablet, TAKE ONE TABLET BY MOUTH EVERY DAY, Disp: 90 tablet, Rfl: 1    nitroglycerin (Nitrostat) 0.4 MG SL tablet,  "Place 1 tablet under the tongue Every 5 (Five) Minutes As Needed for Chest Pain. Take no more than 3 doses in 15 minutes., Disp: 20 tablet, Rfl: 0    omeprazole (priLOSEC) 40 MG capsule, TAKE ONE CAPSULE BY MOUTH EVERY DAY, Disp: 90 capsule, Rfl: 1    Prolia 60 MG/ML solution prefilled syringe syringe, INJECT 60MG SUBCUTANEOUSLY EVERY 6 MONTHS, Disp: 1 mL, Rfl: 0    saccharomyces boulardii (FLORASTOR) 250 MG capsule, Take 1 capsule by mouth Daily., Disp: , Rfl:     simethicone (MYLICON,GAS-X) 125 MG capsule capsule, Take 1 capsule by mouth As Needed., Disp: , Rfl:     traMADol (ULTRAM) 50 MG tablet, Take 1 tablet by mouth 2 (Two) Times a Day As Needed for Severe Pain., Disp: 60 tablet, Rfl: 2    Tenapanor HCl (Ibsrela) 50 MG tablet, Take 1 tablet by mouth 2 (Two) Times a Day., Disp: 60 tablet, Rfl: 3    Current Facility-Administered Medications:     denosumab (PROLIA) syringe 60 mg, 60 mg, Subcutaneous, Q6 Months, Jammie Phillips MD, 60 mg at 08/16/23 0926     Allergies     No Known Allergies    Social History       Social History     Social History Narrative    Not on file         Objective       /75 (BP Location: Left arm, Patient Position: Sitting, Cuff Size: Adult)   Pulse 73   Ht 162.6 cm (64\")   Wt 64 kg (141 lb)   BMI 24.20 kg/m²       Physical Exam    Results       Result Review :    The following data was reviewed by: KEVON Mahan on 02/21/2024:    CBC w/diff          5/12/2023    10:58 12/7/2023    10:51   CBC w/Diff   WBC 6.12  6.26    RBC 4.50  4.66    Hemoglobin 13.7  13.9    Hematocrit 42.9  43.7    MCV 95.3  93.8    MCH 30.4  29.8    MCHC 31.9  31.8    RDW 13.8  13.5    Platelets 192  235    Neutrophil Rel % 58.4  68.9    Immature Granulocyte Rel % 0.0  0.2    Lymphocyte Rel % 31.7  23.6    Monocyte Rel % 6.2  5.3    Eosinophil Rel % 2.9  1.4    Basophil Rel % 0.8  0.6      CMP          5/12/2023    10:58 12/7/2023    10:51   CMP   Glucose 92  80    BUN 20  19  "   Creatinine 0.87  0.90    EGFR 74.5  71.5    Sodium 141  142    Potassium 4.1  4.3    Chloride 102  106    Calcium 9.7  9.6    Total Protein 6.7  7.0    Albumin 4.2  4.7    Globulin 2.5  2.3    Total Bilirubin 0.4  0.3    Alkaline Phosphatase 83  102    AST (SGOT) 24  26    ALT (SGPT) 18  26    Albumin/Globulin Ratio 1.7  2.0    BUN/Creatinine Ratio 23.0  21.1    Anion Gap 10.6  8.0      9/11/2023 colonoscopy-perianal and digital rectal exams were normal.  Terminal ileum appeared normal.  4 mm polyp in the ascending colon-tubular adenoma, completely removed.  Scar found in the distal rectum, biopsy-focal high-grade squamous intraepithelial lesion arising in the background of condyloma.    11/7/2023 anoscopy-no obviously abnormal areas or lesions.           Assessment and Plan              Diagnoses and all orders for this visit:    1. Irritable bowel syndrome with constipation (Primary)  -     Tenapanor HCl (Ibsrela) 50 MG tablet; Take 1 tablet by mouth 2 (Two) Times a Day.  Dispense: 60 tablet; Refill: 3    2. Chronic gastritis without bleeding, unspecified gastritis type    3. Hiatal hernia    4. Anal dysplasia      Recommend to keep scheduled f/u with c/r surgery for further treatment of anal dysplasia.      Initiate PA for ibsrela.  Previously failed miralax, motegrity, linzess, lactulose, trulance and colace.            Follow Up     Follow Up   Return in about 6 months (around 8/21/2024) for constipation.  Patient was given instructions and counseling regarding her condition or for health maintenance advice. Please see specific information pulled into the AVS if appropriate.

## 2024-02-21 NOTE — TELEPHONE ENCOUNTER
Pa approved via covermymeds approval good until 12/31/2024. Left voicemail for pt requesting a returned call to advise

## 2024-02-27 RX ORDER — MELOXICAM 15 MG/1
15 TABLET ORAL DAILY PRN
Qty: 30 TABLET | Refills: 1 | Status: SHIPPED | OUTPATIENT
Start: 2024-02-27

## 2024-03-12 ENCOUNTER — OFFICE VISIT (OUTPATIENT)
Dept: FAMILY MEDICINE CLINIC | Age: 64
End: 2024-03-12
Payer: MEDICARE

## 2024-03-12 VITALS
BODY MASS INDEX: 24.75 KG/M2 | SYSTOLIC BLOOD PRESSURE: 112 MMHG | HEART RATE: 66 BPM | DIASTOLIC BLOOD PRESSURE: 83 MMHG | TEMPERATURE: 97.7 F | HEIGHT: 64 IN | OXYGEN SATURATION: 92 % | WEIGHT: 145 LBS

## 2024-03-12 DIAGNOSIS — I25.10 CORONARY ARTERY DISEASE INVOLVING NATIVE CORONARY ARTERY OF NATIVE HEART WITHOUT ANGINA PECTORIS: ICD-10-CM

## 2024-03-12 DIAGNOSIS — K58.1 IRRITABLE BOWEL SYNDROME WITH CONSTIPATION: ICD-10-CM

## 2024-03-12 DIAGNOSIS — M15.9 GENERALIZED OSTEOARTHRITIS: Primary | ICD-10-CM

## 2024-03-12 DIAGNOSIS — M81.0 AGE-RELATED OSTEOPOROSIS WITHOUT CURRENT PATHOLOGICAL FRACTURE: ICD-10-CM

## 2024-03-12 DIAGNOSIS — Z79.899 HIGH RISK MEDICATION USE: ICD-10-CM

## 2024-03-12 DIAGNOSIS — E03.9 ACQUIRED HYPOTHYROIDISM: ICD-10-CM

## 2024-03-12 DIAGNOSIS — E78.2 MIXED HYPERLIPIDEMIA: ICD-10-CM

## 2024-03-12 DIAGNOSIS — K21.9 GASTROESOPHAGEAL REFLUX DISEASE WITHOUT ESOPHAGITIS: ICD-10-CM

## 2024-03-12 DIAGNOSIS — F41.1 GENERALIZED ANXIETY DISORDER: ICD-10-CM

## 2024-03-12 DIAGNOSIS — I10 ESSENTIAL HYPERTENSION: ICD-10-CM

## 2024-03-12 PROBLEM — Z00.00 ANNUAL PHYSICAL EXAM: Status: RESOLVED | Noted: 2021-11-03 | Resolved: 2024-03-12

## 2024-03-12 LAB
AMPHET+METHAMPHET UR QL: NEGATIVE
AMPHETAMINES UR QL: NEGATIVE
BARBITURATES UR QL SCN: NEGATIVE
BENZODIAZ UR QL SCN: NEGATIVE
BUPRENORPHINE SERPL-MCNC: NEGATIVE NG/ML
CANNABINOIDS SERPL QL: NEGATIVE
COCAINE UR QL: NEGATIVE
EXPIRATION DATE: NORMAL
Lab: NORMAL
MDMA UR QL SCN: NEGATIVE
METHADONE UR QL SCN: NEGATIVE
MORPHINE/OPIATES SCREEN, URINE: NEGATIVE
OXYCODONE UR QL SCN: NEGATIVE
PCP UR QL SCN: NEGATIVE

## 2024-03-12 NOTE — ASSESSMENT & PLAN NOTE
Stable on omeprazole 40 mg daily.  Refills were not needed today.  Continue to monitor.  Wean PPI as able.  Continue IBS meds for now as managed by Gastro with Ibsrela 50 mg BID, docusate and lactulose.

## 2024-03-12 NOTE — ASSESSMENT & PLAN NOTE
Follows with Wayne LUND, [facility], [place], [place]  Stable on aspirin, statin, ACE-I.

## 2024-03-12 NOTE — ASSESSMENT & PLAN NOTE
On Prolia.  Last DEXA was done 2/2023 showing improved osteopenia.  Due for her next shot as soon as this comes in.

## 2024-03-12 NOTE — ASSESSMENT & PLAN NOTE
Stable on buspirone 5 mg BID and duloxetine 60 mg daily.  Refills were not needed today.  Continue to monitor.

## 2024-03-12 NOTE — ASSESSMENT & PLAN NOTE
Stable on levothyroxine 100 mcg daily.  Refills were not needed today.  Labs were not due today.  Continue to monitor.

## 2024-03-12 NOTE — PROGRESS NOTES
"Chief Complaint  Hyperlipidemia (F/u )    Subjective          Addie Yates presents to [facility] today for follow-up on chronic issues.    She is on tramadol and duloxetine for chronic neck and back pain due to generalized arthritis and scoliosis.  No adverse effects.  Gabapentin was ineffective.  In the past, she has followed with Physical Therapy, chiropractor, and [facility] for injections but these were unfortunately ineffective.    She is on lisinopril for HTN.  Her BP has been well controlled.  No chest pain, palpitations or shortness of breath.  She is on atorvastatin for HLD and CAD.  No myalgias.  She is on ASA daily for CAD.  S/p stenting in 11/2015.  Follows with Cardiology Wayne LUND.  She recently had a stress test that was reportedly normal.  She had Zio patch that showed some fast heart rate.      She is on duloxetine and buspirone for anxiety.  Mood has been \"fine.\"  No depressed mood or anhedonia, anxiety or panic attacks.     She is on levothyroxine for hypothyroidism.  No heat/cold intolerance, changes in skin or hair.     She is on omeprazole for GERD and esophageal spasm.  Most recent EGD by Dr. Leroy showed hiatal hernia and gastritis.  Gastric emptying study has shown gastroparesis.  She is on Ibsrela, docusate, and lactulose for constipation.  No longer taking Motegrity.  She feels that she has more gas and bloating, she feels, since switching to Ibsrela a couple of weeks ago.  She has been diagnosed with high grade dysplasia of the rectum with recent recurrence of the lesion.  She is going back to Colorectal Surg this month for repeat resection.  Also following with Thuy Caldwell/Dr. Leroy [facility].      She is on Prolia for osteoporosis.  She is up-to-date on DEXA, last done 2/2023 and this showed osteopenia.       Current Outpatient Medications:     aspirin 81 MG EC tablet, Take 1 tablet by mouth Every Night., Disp: , Rfl:     " atorvastatin (LIPITOR) 80 MG tablet, Take 1 tablet by mouth Daily., Disp: , Rfl:     busPIRone (BUSPAR) 5 MG tablet, Take 1 tablet by mouth 2 (Two) Times a Day., Disp: 180 tablet, Rfl: 1    Calcium Citrate-Vitamin D3 (CITRACAL) 315-6.25 MG-MCG tablet tablet, Take  by mouth Daily., Disp: , Rfl:     docusate sodium (COLACE) 100 MG capsule, Take 1 capsule by mouth 2 (Two) Times a Day., Disp: , Rfl:     DULoxetine (CYMBALTA) 60 MG capsule, Take 1 capsule by mouth Daily., Disp: 90 capsule, Rfl: 1    ketoconazole (NIZORAL) 2 % shampoo, SHAMPOO SCALP 2-3 TIMES WEEKLY AS NEEDED. ALTERNATE WITH OTHER SHAMPOO, Disp: , Rfl:     levothyroxine (SYNTHROID, LEVOTHROID) 100 MCG tablet, TAKE ONE TABLET BY MOUTH EVERY DAY, Disp: 90 tablet, Rfl: 1    lisinopril (PRINIVIL,ZESTRIL) 5 MG tablet, Take 1 tablet by mouth Every Night., Disp: , Rfl:     meloxicam (MOBIC) 15 MG tablet, Take 1 tablet by mouth Daily As Needed for Moderate Pain. Take with food., Disp: 30 tablet, Rfl: 1    Motegrity 2 MG tablet, TAKE ONE TABLET BY MOUTH EVERY DAY, Disp: 90 tablet, Rfl: 1    nitroglycerin (Nitrostat) 0.4 MG SL tablet, Place 1 tablet under the tongue Every 5 (Five) Minutes As Needed for Chest Pain. Take no more than 3 doses in 15 minutes., Disp: 20 tablet, Rfl: 0    omeprazole (priLOSEC) 40 MG capsule, TAKE ONE CAPSULE BY MOUTH EVERY DAY, Disp: 90 capsule, Rfl: 1    Prolia 60 MG/ML solution prefilled syringe syringe, INJECT 60MG SUBCUTANEOUSLY EVERY 6 MONTHS, Disp: 1 mL, Rfl: 0    saccharomyces boulardii (FLORASTOR) 250 MG capsule, Take 1 capsule by mouth Daily., Disp: , Rfl:     simethicone (MYLICON,GAS-X) 125 MG capsule capsule, Take 1 capsule by mouth As Needed., Disp: , Rfl:     Tenapanor HCl (Ibsrela) 50 MG tablet, Take 1 tablet by mouth 2 (Two) Times a Day., Disp: 60 tablet, Rfl: 3    traMADol (ULTRAM) 50 MG tablet, Take 1 tablet by mouth 2 (Two) Times a Day As Needed for Severe Pain., Disp: 60 tablet, Rfl: 2    Current Facility-Administered  "Medications:     denosumab (PROLIA) syringe 60 mg, 60 mg, Subcutaneous, Q6 Months, Jammie Phillips MD, 60 mg at 08/16/23 0926  There are no discontinued medications.      Allergies:  Patient has no known allergies.      Objective   Vital Signs:   Vitals:    03/12/24 0910   Weight: 65.8 kg (145 lb)   Height: 162.6 cm (64.02\")     BMI is within normal parameters. No other follow-up for BMI required.        Physical Exam  Vitals reviewed.   Constitutional:       General: She is not in acute distress.     Appearance: Normal appearance. She is well-developed.   HENT:      Head: Normocephalic and atraumatic.      Right Ear: External ear normal.      Left Ear: External ear normal.   Eyes:      Extraocular Movements: Extraocular movements intact.      Conjunctiva/sclera: Conjunctivae normal.      Pupils: Pupils are equal, round, and reactive to light.   Cardiovascular:      Rate and Rhythm: Normal rate and regular rhythm.      Heart sounds: No murmur heard.  Pulmonary:      Effort: Pulmonary effort is normal.      Breath sounds: Normal breath sounds. No wheezing, rhonchi or rales.   Abdominal:      General: Bowel sounds are normal. There is no distension.      Palpations: Abdomen is soft.      Tenderness: There is no abdominal tenderness.   Musculoskeletal:         General: Tenderness (bilateral cervical and lumbar paraspinals) present. Normal range of motion.   Neurological:      Mental Status: She is alert.   Psychiatric:         Mood and Affect: Affect normal.         Lab Results   Component Value Date    GLUCOSE 80 12/07/2023    BUN 19 12/07/2023    CREATININE 0.90 12/07/2023    EGFRIFNONA 78 12/29/2021    EGFRIFAFRI 76 02/28/2018    BCR 21.1 12/07/2023    K 4.3 12/07/2023    CO2 28.0 12/07/2023    CALCIUM 9.6 12/07/2023    ALBUMIN 4.7 12/07/2023    LABIL2 2.0 04/15/2021    AST 26 12/07/2023    ALT 26 12/07/2023       Lab Results   Component Value Date    CHOL 146 12/07/2023    CHLPL 135 04/15/2021    TRIG 46 " 12/07/2023    HDL 63 (H) 12/07/2023    LDL 73 12/07/2023       Lab Results   Component Value Date    WBC 6.26 12/07/2023    HGB 13.9 12/07/2023    HCT 43.7 12/07/2023    MCV 93.8 12/07/2023     12/07/2023            Assessment and Plan    Assessment & Plan  Generalized osteoarthritis  Stable on current regimen.  Symptoms are well controlled.  No adverse effects. She does require ongoing use of this controlled substance to function.  Tox screen was due today.  Prior tox screen appropriate.  HAYLEY was run today.  Refills were not needed today.  RTC 3 months.    High risk medication use    Essential hypertension    Blood pressure has been running at goal.  Continue lisinopril 5 mg daily.  Refills were not needed today.  Labs were not needed today.  Continue to monitor.    Mixed hyperlipidemia     Stable on atorvastatin 80 mg daily.  Refills were not needed today.  Labs were not due today.  Continue to monitor.    Coronary artery disease involving native coronary artery of native heart without angina pectoris    Follows with Wayne LUND, [facility], [place], [place]  Stable on aspirin, statin, ACE-I.  Acquired hypothyroidism  Stable on levothyroxine 100 mcg daily.  Refills were not needed today.  Labs were not due today.  Continue to monitor.    Generalized anxiety disorder  Stable on buspirone 5 mg BID and duloxetine 60 mg daily.  Refills were not needed today.  Continue to monitor.    Gastroesophageal reflux disease without esophagitis  Stable on omeprazole 40 mg daily.  Refills were not needed today.  Continue to monitor.  Wean PPI as able.  Continue IBS meds for now as managed by Gastro with Ibsrela 50 mg BID, docusate and lactulose.  Irritable bowel syndrome with constipation  As per GERD plan.  Age-related osteoporosis without current pathological fracture  On Prolia.  Last DEXA was done 2/2023 showing improved osteopenia.  Due for her next shot as soon as this comes in.              Follow Up    No follow-ups on file.  Patient was given instructions and counseling regarding her condition or for health maintenance advice. Please see specific information pulled into the AVS if appropriate.           03/12/2024

## 2024-03-19 ENCOUNTER — TELEPHONE (OUTPATIENT)
Dept: GASTROENTEROLOGY | Facility: CLINIC | Age: 64
End: 2024-03-19
Payer: MEDICARE

## 2024-03-19 RX ORDER — PRUCALOPRIDE 2 MG/1
2 TABLET, FILM COATED ORAL DAILY
Qty: 90 TABLET | Refills: 1 | Status: SHIPPED | OUTPATIENT
Start: 2024-03-19 | End: 2024-06-17

## 2024-03-19 NOTE — TELEPHONE ENCOUNTER
Patient left voicemail stating Ibsrela is not working she would like to know if she should go back to [facility] please advise

## 2024-03-25 ENCOUNTER — TELEPHONE (OUTPATIENT)
Dept: GASTROENTEROLOGY | Facility: CLINIC | Age: 64
End: 2024-03-25
Payer: MEDICARE

## 2024-03-25 NOTE — TELEPHONE ENCOUNTER
Patient left a voicemail that she had questions about her medication, I attempted to reach her back and left a voicemail with my direct number for patient to call back.

## 2024-03-27 ENCOUNTER — TELEPHONE (OUTPATIENT)
Dept: FAMILY MEDICINE CLINIC | Age: 64
End: 2024-03-27
Payer: MEDICARE

## 2024-04-08 DIAGNOSIS — M15.9 GENERALIZED OSTEOARTHRITIS: ICD-10-CM

## 2024-04-09 RX ORDER — TRAMADOL HYDROCHLORIDE 50 MG/1
50 TABLET ORAL 2 TIMES DAILY PRN
Qty: 60 TABLET | Refills: 2 | Status: SHIPPED | OUTPATIENT
Start: 2024-04-09

## 2024-04-26 RX ORDER — MELOXICAM 15 MG/1
TABLET ORAL
Qty: 30 TABLET | Refills: 1 | Status: SHIPPED | OUTPATIENT
Start: 2024-04-26

## 2024-05-09 ENCOUNTER — TELEPHONE (OUTPATIENT)
Dept: FAMILY MEDICINE CLINIC | Age: 64
End: 2024-05-09
Payer: MEDICARE

## 2024-06-19 ENCOUNTER — LAB (OUTPATIENT)
Dept: LAB | Facility: HOSPITAL | Age: 64
End: 2024-06-19
Payer: MEDICARE

## 2024-06-19 ENCOUNTER — OFFICE VISIT (OUTPATIENT)
Dept: FAMILY MEDICINE CLINIC | Age: 64
End: 2024-06-19
Payer: MEDICARE

## 2024-06-19 VITALS
HEIGHT: 64 IN | HEART RATE: 63 BPM | SYSTOLIC BLOOD PRESSURE: 112 MMHG | OXYGEN SATURATION: 95 % | WEIGHT: 146.4 LBS | DIASTOLIC BLOOD PRESSURE: 76 MMHG | BODY MASS INDEX: 25 KG/M2 | TEMPERATURE: 98 F

## 2024-06-19 DIAGNOSIS — K21.9 GASTROESOPHAGEAL REFLUX DISEASE WITHOUT ESOPHAGITIS: ICD-10-CM

## 2024-06-19 DIAGNOSIS — I10 ESSENTIAL HYPERTENSION: ICD-10-CM

## 2024-06-19 DIAGNOSIS — E78.2 MIXED HYPERLIPIDEMIA: ICD-10-CM

## 2024-06-19 DIAGNOSIS — Z79.899 HIGH RISK MEDICATION USE: ICD-10-CM

## 2024-06-19 DIAGNOSIS — Z87.891 PERSONAL HISTORY OF TOBACCO USE, PRESENTING HAZARDS TO HEALTH: ICD-10-CM

## 2024-06-19 DIAGNOSIS — M15.9 GENERALIZED OSTEOARTHRITIS: Primary | ICD-10-CM

## 2024-06-19 DIAGNOSIS — F41.1 GENERALIZED ANXIETY DISORDER: ICD-10-CM

## 2024-06-19 DIAGNOSIS — K44.9 HIATAL HERNIA: ICD-10-CM

## 2024-06-19 DIAGNOSIS — I25.10 CORONARY ARTERY DISEASE INVOLVING NATIVE CORONARY ARTERY OF NATIVE HEART WITHOUT ANGINA PECTORIS: ICD-10-CM

## 2024-06-19 DIAGNOSIS — E03.9 ACQUIRED HYPOTHYROIDISM: ICD-10-CM

## 2024-06-19 DIAGNOSIS — Z23 ENCOUNTER FOR IMMUNIZATION: ICD-10-CM

## 2024-06-19 LAB
ALBUMIN SERPL-MCNC: 4.2 G/DL (ref 3.5–5.2)
ALBUMIN/GLOB SERPL: 1.7 G/DL
ALP SERPL-CCNC: 141 U/L (ref 39–117)
ALT SERPL W P-5'-P-CCNC: 15 U/L (ref 1–33)
AMPHET+METHAMPHET UR QL: NEGATIVE
AMPHETAMINES UR QL: NEGATIVE
ANION GAP SERPL CALCULATED.3IONS-SCNC: 7 MMOL/L (ref 5–15)
AST SERPL-CCNC: 17 U/L (ref 1–32)
BARBITURATES UR QL SCN: NEGATIVE
BASOPHILS # BLD AUTO: 0.02 10*3/MM3 (ref 0–0.2)
BASOPHILS NFR BLD AUTO: 0.4 % (ref 0–1.5)
BENZODIAZ UR QL SCN: NEGATIVE
BILIRUB SERPL-MCNC: 0.2 MG/DL (ref 0–1.2)
BUN SERPL-MCNC: 28 MG/DL (ref 8–23)
BUN/CREAT SERPL: 31.5 (ref 7–25)
BUPRENORPHINE SERPL-MCNC: NEGATIVE NG/ML
CALCIUM SPEC-SCNC: 9.4 MG/DL (ref 8.6–10.5)
CANNABINOIDS SERPL QL: NEGATIVE
CHLORIDE SERPL-SCNC: 103 MMOL/L (ref 98–107)
CHOLEST SERPL-MCNC: 152 MG/DL (ref 0–200)
CO2 SERPL-SCNC: 30 MMOL/L (ref 22–29)
COCAINE UR QL: NEGATIVE
CREAT SERPL-MCNC: 0.89 MG/DL (ref 0.57–1)
DEPRECATED RDW RBC AUTO: 47.2 FL (ref 37–54)
EGFRCR SERPLBLD CKD-EPI 2021: 72.1 ML/MIN/1.73
EOSINOPHIL # BLD AUTO: 0.12 10*3/MM3 (ref 0–0.4)
EOSINOPHIL NFR BLD AUTO: 2.1 % (ref 0.3–6.2)
ERYTHROCYTE [DISTWIDTH] IN BLOOD BY AUTOMATED COUNT: 13.2 % (ref 12.3–15.4)
EXPIRATION DATE: NORMAL
GLOBULIN UR ELPH-MCNC: 2.5 GM/DL
GLUCOSE SERPL-MCNC: 103 MG/DL (ref 65–99)
HCT VFR BLD AUTO: 42.3 % (ref 34–46.6)
HDLC SERPL-MCNC: 70 MG/DL (ref 40–60)
HGB BLD-MCNC: 13.2 G/DL (ref 12–15.9)
IMM GRANULOCYTES # BLD AUTO: 0.01 10*3/MM3 (ref 0–0.05)
IMM GRANULOCYTES NFR BLD AUTO: 0.2 % (ref 0–0.5)
LDLC SERPL CALC-MCNC: 71 MG/DL (ref 0–100)
LDLC/HDLC SERPL: 1.03 {RATIO}
LYMPHOCYTES # BLD AUTO: 1.77 10*3/MM3 (ref 0.7–3.1)
LYMPHOCYTES NFR BLD AUTO: 31.2 % (ref 19.6–45.3)
Lab: NORMAL
MCH RBC QN AUTO: 30 PG (ref 26.6–33)
MCHC RBC AUTO-ENTMCNC: 31.2 G/DL (ref 31.5–35.7)
MCV RBC AUTO: 96.1 FL (ref 79–97)
MDMA UR QL SCN: NEGATIVE
METHADONE UR QL SCN: NEGATIVE
MONOCYTES # BLD AUTO: 0.42 10*3/MM3 (ref 0.1–0.9)
MONOCYTES NFR BLD AUTO: 7.4 % (ref 5–12)
MORPHINE/OPIATES SCREEN, URINE: NEGATIVE
NEUTROPHILS NFR BLD AUTO: 3.34 10*3/MM3 (ref 1.7–7)
NEUTROPHILS NFR BLD AUTO: 58.7 % (ref 42.7–76)
OXYCODONE UR QL SCN: NEGATIVE
PCP UR QL SCN: NEGATIVE
PLATELET # BLD AUTO: 205 10*3/MM3 (ref 140–450)
PMV BLD AUTO: 10.1 FL (ref 6–12)
POTASSIUM SERPL-SCNC: 4.8 MMOL/L (ref 3.5–5.2)
PROT SERPL-MCNC: 6.7 G/DL (ref 6–8.5)
RBC # BLD AUTO: 4.4 10*6/MM3 (ref 3.77–5.28)
SODIUM SERPL-SCNC: 140 MMOL/L (ref 136–145)
TRIGL SERPL-MCNC: 50 MG/DL (ref 0–150)
TSH SERPL DL<=0.05 MIU/L-ACNC: 1.91 UIU/ML (ref 0.27–4.2)
VLDLC SERPL-MCNC: 11 MG/DL (ref 5–40)
WBC NRBC COR # BLD AUTO: 5.68 10*3/MM3 (ref 3.4–10.8)

## 2024-06-19 PROCEDURE — 80053 COMPREHEN METABOLIC PANEL: CPT

## 2024-06-19 PROCEDURE — 85025 COMPLETE CBC W/AUTO DIFF WBC: CPT

## 2024-06-19 PROCEDURE — 84443 ASSAY THYROID STIM HORMONE: CPT

## 2024-06-19 PROCEDURE — G0009 ADMIN PNEUMOCOCCAL VACCINE: HCPCS | Performed by: FAMILY MEDICINE

## 2024-06-19 PROCEDURE — 80061 LIPID PANEL: CPT

## 2024-06-19 PROCEDURE — 1159F MED LIST DOCD IN RCRD: CPT | Performed by: FAMILY MEDICINE

## 2024-06-19 PROCEDURE — 1160F RVW MEDS BY RX/DR IN RCRD: CPT | Performed by: FAMILY MEDICINE

## 2024-06-19 PROCEDURE — 3074F SYST BP LT 130 MM HG: CPT | Performed by: FAMILY MEDICINE

## 2024-06-19 PROCEDURE — 90677 PCV20 VACCINE IM: CPT | Performed by: FAMILY MEDICINE

## 2024-06-19 PROCEDURE — 36415 COLL VENOUS BLD VENIPUNCTURE: CPT

## 2024-06-19 PROCEDURE — 3078F DIAST BP <80 MM HG: CPT | Performed by: FAMILY MEDICINE

## 2024-06-19 PROCEDURE — 99214 OFFICE O/P EST MOD 30 MIN: CPT | Performed by: FAMILY MEDICINE

## 2024-06-19 RX ORDER — LEVOTHYROXINE SODIUM 0.1 MG/1
100 TABLET ORAL DAILY
Qty: 90 TABLET | Refills: 1 | Status: SHIPPED | OUTPATIENT
Start: 2024-06-19

## 2024-06-19 RX ORDER — FAMOTIDINE 40 MG/1
40 TABLET, FILM COATED ORAL NIGHTLY
Qty: 30 TABLET | Refills: 5 | Status: SHIPPED | OUTPATIENT
Start: 2024-06-19

## 2024-06-19 RX ORDER — OMEPRAZOLE 40 MG/1
40 CAPSULE, DELAYED RELEASE ORAL DAILY
Qty: 90 CAPSULE | Refills: 1 | Status: SHIPPED | OUTPATIENT
Start: 2024-06-19

## 2024-06-19 RX ORDER — DULOXETIN HYDROCHLORIDE 60 MG/1
60 CAPSULE, DELAYED RELEASE ORAL DAILY
Qty: 90 CAPSULE | Refills: 1 | Status: SHIPPED | OUTPATIENT
Start: 2024-06-19

## 2024-06-19 RX ORDER — BUSPIRONE HYDROCHLORIDE 5 MG/1
5 TABLET ORAL 2 TIMES DAILY
Qty: 180 TABLET | Refills: 1 | Status: SHIPPED | OUTPATIENT
Start: 2024-06-19

## 2024-06-19 NOTE — ASSESSMENT & PLAN NOTE
Stable on levothyroxine 100 mcg daily.  Refills were needed today.  Labs were due today.  Continue to monitor.

## 2024-06-19 NOTE — PROGRESS NOTES
"Chief Complaint  Osteoarthritis    Subjective          Addie Yates presents to [facility] today for routine follow-up.     She is due for Prevnar 20.    She is on tramadol as well as duloxetine for longstanding neck and low back pain due to diffuse osteoarthritis and scoliosis.  Denies adverse effects.  She has previously been on gabapentin (ineffective).  In the past, she has followed with Physical Therapy, a chiropractor, and [facility] for injections (ineffective).    She is on lisinopril for hypertension.  Her pressure has been well controlled.  Denies chest pain, palpitations or shortness of breath.  She is on atorvastatin for hyperlipidemia and CAD.  Denies myalgias.  She is on aspirin 81 mg daily for CAD.  S/p stenting 11/2015.  She follows with Cardiology Wayne LUND.        She is on duloxetine and buspirone for anxiety.  Mood has been \"same old.\"  No depressed mood or anhedonia, anxiety or panic attacks.     She is on levothyroxine for hypothyroidism.  Denies heat/cold intolerance, changes in hair or skin.    She is on omeprazole for GERD and esophageal spasm.  Still  having some reflux problems.  Status post EGD by Dr. Leroy showed hiatal hernia and gastritis.  Gastric emptying study has shown gastroparesis.  She is on Ibsrela, docusate, and lactulose for constipation.  She has previously been on Motegrity.  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  Following with Colorectal Surgery and Thuy Caldwell/Dr. Leroy Gastro.  Pathology came back showing HSIL in March, so she is going back to see the surgeon in 1 year.        She is on Prolia for osteoporosis.  She is up-to-date on DEXA, last done 2/2023 and this showed osteopenia.       Current Outpatient Medications:     aspirin 81 MG EC tablet, Take 1 tablet by mouth Every Night., Disp: , Rfl:     atorvastatin (LIPITOR) 80 MG tablet, Take 1 tablet by mouth Daily., Disp: , Rfl:     " busPIRone (BUSPAR) 5 MG tablet, Take 1 tablet by mouth 2 (Two) Times a Day., Disp: 180 tablet, Rfl: 1    Calcium Citrate-Vitamin D3 (CITRACAL) 315-6.25 MG-MCG tablet tablet, Take  by mouth Daily., Disp: , Rfl:     docusate sodium (COLACE) 100 MG capsule, Take 1 capsule by mouth 2 (Two) Times a Day., Disp: , Rfl:     DULoxetine (CYMBALTA) 60 MG capsule, Take 1 capsule by mouth Daily., Disp: 90 capsule, Rfl: 1    ketoconazole (NIZORAL) 2 % shampoo, SHAMPOO SCALP 2-3 TIMES WEEKLY AS NEEDED. ALTERNATE WITH OTHER SHAMPOO, Disp: , Rfl:     levothyroxine (SYNTHROID, LEVOTHROID) 100 MCG tablet, Take 1 tablet by mouth Daily., Disp: 90 tablet, Rfl: 1    lisinopril (PRINIVIL,ZESTRIL) 5 MG tablet, Take 1 tablet by mouth Every Night., Disp: , Rfl:     meloxicam (MOBIC) 15 MG tablet, TAKE ONE TABLET BY MOUTH AS NEEDED FOR PAIN, Disp: 30 tablet, Rfl: 1    nitroglycerin (Nitrostat) 0.4 MG SL tablet, Place 1 tablet under the tongue Every 5 (Five) Minutes As Needed for Chest Pain. Take no more than 3 doses in 15 minutes., Disp: 20 tablet, Rfl: 0    omeprazole (priLOSEC) 40 MG capsule, Take 1 capsule by mouth Daily., Disp: 90 capsule, Rfl: 1    Prolia 60 MG/ML solution prefilled syringe syringe, INJECT 60MG SUBCUTANEOUSLY EVERY 6 MONTHS, Disp: 1 mL, Rfl: 0    saccharomyces boulardii (FLORASTOR) 250 MG capsule, Take 1 capsule by mouth Daily., Disp: , Rfl:     simethicone (MYLICON,GAS-X) 125 MG capsule capsule, Take 1 capsule by mouth As Needed., Disp: , Rfl:     traMADol (ULTRAM) 50 MG tablet, Take 1 tablet by mouth 2 (Two) Times a Day As Needed for Severe Pain or Moderate Pain., Disp: 60 tablet, Rfl: 2    famotidine (PEPCID) 40 MG tablet, Take 1 tablet by mouth Every Night., Disp: 30 tablet, Rfl: 5    Current Facility-Administered Medications:     denosumab (PROLIA) syringe 60 mg, 60 mg, Subcutaneous, Q6 Months, Jammie Phillips MD, 60 mg at 08/16/23 0902  Medications Discontinued During This Encounter   Medication Reason     "DULoxetine (CYMBALTA) 60 MG capsule Reorder    busPIRone (BUSPAR) 5 MG tablet Reorder    levothyroxine (SYNTHROID, LEVOTHROID) 100 MCG tablet Reorder    omeprazole (priLOSEC) 40 MG capsule Reorder         Allergies:  Patient has no known allergies.      Objective   Vital Signs:   Vitals:    06/19/24 0749   BP: 112/76   BP Location: Left arm   Patient Position: Sitting   Cuff Size: Adult   Pulse: 63   Temp: 98 °F (36.7 °C)   TempSrc: Oral   SpO2: 95%   Weight: 66.4 kg (146 lb 6.4 oz)   Height: 162.6 cm (64.02\")     BMI is within normal parameters. No other follow-up for BMI required.        Physical Exam  Vitals reviewed.   Constitutional:       General: She is not in acute distress.     Appearance: Normal appearance. She is well-developed.   HENT:      Head: Normocephalic and atraumatic.      Right Ear: External ear normal.      Left Ear: External ear normal.   Eyes:      Extraocular Movements: Extraocular movements intact.      Conjunctiva/sclera: Conjunctivae normal.      Pupils: Pupils are equal, round, and reactive to light.   Cardiovascular:      Rate and Rhythm: Normal rate and regular rhythm.      Heart sounds: No murmur heard.  Pulmonary:      Effort: Pulmonary effort is normal.      Breath sounds: Normal breath sounds. No wheezing, rhonchi or rales.   Abdominal:      General: Bowel sounds are normal. There is no distension.      Palpations: Abdomen is soft.      Tenderness: There is no abdominal tenderness.   Musculoskeletal:         General: Tenderness (bilateral cervical and lumbar paraspinals) present. Normal range of motion.   Neurological:      Mental Status: She is alert.   Psychiatric:         Mood and Affect: Affect normal.           Lab Results   Component Value Date    GLUCOSE 80 12/07/2023    BUN 19 12/07/2023    CREATININE 0.90 12/07/2023    EGFRIFNONA 78 12/29/2021    EGFRIFAFRI 76 02/28/2018    BCR 21.1 12/07/2023    K 4.3 12/07/2023    CO2 28.0 12/07/2023    CALCIUM 9.6 12/07/2023    ALBUMIN " 4.7 12/07/2023    LABIL2 2.0 04/15/2021    AST 26 12/07/2023    ALT 26 12/07/2023       Lab Results   Component Value Date    CHOL 146 12/07/2023    CHLPL 135 04/15/2021    TRIG 46 12/07/2023    HDL 63 (H) 12/07/2023    LDL 73 12/07/2023       Lab Results   Component Value Date    WBC 6.26 12/07/2023    HGB 13.9 12/07/2023    HCT 43.7 12/07/2023    MCV 93.8 12/07/2023     12/07/2023            Assessment and Plan    Assessment & Plan  Generalized osteoarthritis  Stable on current regimen.  Symptoms are well controlled.  No adverse effects. She does require ongoing use of this controlled substance to function.  Tox screen was due today.  Prior tox screen appropriate.  HAYLEY was run today.  Refills were not needed today.  RTC 3 months.   High risk medication use    Essential hypertension  Blood pressure has been running at goal.  Continue lisinopril 5 mg daily.  Refills were not needed today.  Labs were needed today.  Continue to monitor.   Mixed hyperlipidemia   Stable on atorvastatin 80 mg daily.  Refills were not needed today.  Labs were due today.  Continue to monitor.   Coronary artery disease involving native coronary artery of native heart without angina pectoris  Follows with Wayne LUND, [facility].  Stable on aspirin, statin, ACE-I.  Acquired hypothyroidism  Stable on levothyroxine 100 mcg daily.  Refills were needed today.  Labs were due today.  Continue to monitor.   Generalized anxiety disorder  Stable on duloxetine 60 mg daily and buspirone 5 mg twice daily.  Refills were needed today.  Continue to monitor.   Gastroesophageal reflux disease without esophagitis  Still having some difficulties with reflux despite the PPI.  Adding famotidine as below.  Continue on omeprazole 40 mg daily.  Refills were needed today.  Continue to monitor.  Wean PPI as able.  Following with Gastro.  Hiatal hernia  As per GERD plan.  Encounter for immunization      Orders Placed This Encounter   Procedures     Pneumococcal Conjugate Vaccine 20-Valent All    Lipid Panel    Comprehensive Metabolic Panel    CBC Auto Differential    TSH    POC Medline 12 Panel Urine Drug Screen     New Medications Ordered This Visit   Medications    levothyroxine (SYNTHROID, LEVOTHROID) 100 MCG tablet     Sig: Take 1 tablet by mouth Daily.     Dispense:  90 tablet     Refill:  1    busPIRone (BUSPAR) 5 MG tablet     Sig: Take 1 tablet by mouth 2 (Two) Times a Day.     Dispense:  180 tablet     Refill:  1    DULoxetine (CYMBALTA) 60 MG capsule     Sig: Take 1 capsule by mouth Daily.     Dispense:  90 capsule     Refill:  1    omeprazole (priLOSEC) 40 MG capsule     Sig: Take 1 capsule by mouth Daily.     Dispense:  90 capsule     Refill:  1    famotidine (PEPCID) 40 MG tablet     Sig: Take 1 tablet by mouth Every Night.     Dispense:  30 tablet     Refill:  5           Follow Up   Return in about 3 months (around 9/19/2024) for Recheck.  Patient was given instructions and counseling regarding her condition or for health maintenance advice. Please see specific information pulled into the AVS if appropriate.           03/12/2024

## 2024-06-19 NOTE — ASSESSMENT & PLAN NOTE
Stable on duloxetine 60 mg daily and buspirone 5 mg twice daily.  Refills were needed today.  Continue to monitor.

## 2024-07-02 RX ORDER — MELOXICAM 15 MG/1
15 TABLET ORAL DAILY PRN
Qty: 30 TABLET | Refills: 1 | Status: SHIPPED | OUTPATIENT
Start: 2024-07-02

## 2024-07-15 DIAGNOSIS — M15.9 GENERALIZED OSTEOARTHRITIS: ICD-10-CM

## 2024-07-15 RX ORDER — TRAMADOL HYDROCHLORIDE 50 MG/1
50 TABLET ORAL 2 TIMES DAILY PRN
Qty: 30 TABLET | Refills: 0 | Status: SHIPPED | OUTPATIENT
Start: 2024-07-15

## 2024-07-26 ENCOUNTER — OFFICE VISIT (OUTPATIENT)
Dept: FAMILY MEDICINE CLINIC | Age: 64
End: 2024-07-26
Payer: MEDICARE

## 2024-07-26 VITALS
SYSTOLIC BLOOD PRESSURE: 135 MMHG | OXYGEN SATURATION: 94 % | DIASTOLIC BLOOD PRESSURE: 87 MMHG | HEART RATE: 64 BPM | TEMPERATURE: 97.8 F | HEIGHT: 64 IN | WEIGHT: 147 LBS | BODY MASS INDEX: 25.1 KG/M2

## 2024-07-26 DIAGNOSIS — M54.42 CHRONIC BILATERAL LOW BACK PAIN WITH BILATERAL SCIATICA: Primary | ICD-10-CM

## 2024-07-26 DIAGNOSIS — M54.41 CHRONIC BILATERAL LOW BACK PAIN WITH BILATERAL SCIATICA: Primary | ICD-10-CM

## 2024-07-26 DIAGNOSIS — G89.29 CHRONIC BILATERAL LOW BACK PAIN WITH BILATERAL SCIATICA: Primary | ICD-10-CM

## 2024-07-26 PROCEDURE — 3079F DIAST BP 80-89 MM HG: CPT | Performed by: FAMILY MEDICINE

## 2024-07-26 PROCEDURE — 1160F RVW MEDS BY RX/DR IN RCRD: CPT | Performed by: FAMILY MEDICINE

## 2024-07-26 PROCEDURE — 99214 OFFICE O/P EST MOD 30 MIN: CPT | Performed by: FAMILY MEDICINE

## 2024-07-26 PROCEDURE — 1159F MED LIST DOCD IN RCRD: CPT | Performed by: FAMILY MEDICINE

## 2024-07-26 PROCEDURE — G2211 COMPLEX E/M VISIT ADD ON: HCPCS | Performed by: FAMILY MEDICINE

## 2024-07-26 PROCEDURE — 3075F SYST BP GE 130 - 139MM HG: CPT | Performed by: FAMILY MEDICINE

## 2024-07-26 RX ORDER — TIZANIDINE 4 MG/1
4 TABLET ORAL 2 TIMES DAILY PRN
Qty: 40 TABLET | Refills: 0 | Status: SHIPPED | OUTPATIENT
Start: 2024-07-26

## 2024-07-26 NOTE — PATIENT INSTRUCTIONS
Low Back Sprain or Strain Rehab  Ask your health care provider which exercises are safe for you. Do exercises exactly as told by your health care provider and adjust them as directed. It is normal to feel mild stretching, pulling, tightness, or discomfort as you do these exercises. Stop right away if you feel sudden pain or your pain gets worse. Do not begin these exercises until told by your health care provider.  Stretching and range-of-motion exercises  These exercises warm up your muscles and joints and improve the movement and flexibility of your back. These exercises also help to relieve pain, numbness, and tingling.  Lumbar rotation    Lie on your back on a firm bed or the floor with your knees bent.  Straighten your arms out to your sides so each arm forms a 90-degree angle (right angle) with a side of your body.  Slowly move (rotate) both of your knees to one side of your body until you feel a stretch in your lower back (lumbar). Try not to let your shoulders lift off the floor.  Hold this position for __________ seconds.  Tense your abdominal muscles and slowly move your knees back to the starting position.  Repeat this exercise on the other side of your body.  Repeat __________ times. Complete this exercise __________ times a day.  Single knee to chest    Lie on your back on a firm bed or the floor with both legs straight.  Bend one of your knees. Use your hands to move your knee up toward your chest until you feel a gentle stretch in your lower back and buttock.  Hold your leg in this position by holding on to the front of your knee.  Keep your other leg as straight as possible.  Hold this position for __________ seconds.  Slowly return to the starting position.  Repeat with your other leg.  Repeat __________ times. Complete this exercise __________ times a day.  Prone extension on elbows    Lie on your abdomen on a firm bed or the floor (prone position).  Prop yourself up on your elbows.  Use your arms  to help lift your chest up until you feel a gentle stretch in your abdomen and your lower back.  This will place some of your body weight on your elbows. If this is uncomfortable, try stacking pillows under your chest.  Your hips should stay down, against the surface that you are lying on. Keep your hip and back muscles relaxed.  Hold this position for __________ seconds.  Slowly relax your upper body and return to the starting position.  Repeat __________ times. Complete this exercise __________ times a day.  Strengthening exercises  These exercises build strength and endurance in your back. Endurance is the ability to use your muscles for a long time, even after they get tired.  Pelvic tilt  This exercise strengthens the muscles that lie deep in the abdomen.  Lie on your back on a firm bed or the floor with your legs extended.  Bend your knees so they are pointing toward the ceiling and your feet are flat on the floor.  Tighten your lower abdominal muscles to press your lower back against the floor. This motion will tilt your pelvis so your tailbone points up toward the ceiling instead of pointing to your feet or the floor.  To help with this exercise, you may place a small towel under your lower back and try to push your back into the towel.  Hold this position for __________ seconds.  Let your muscles relax completely before you repeat this exercise.  Repeat __________ times. Complete this exercise __________ times a day.  Alternating arm and leg raises    Get on your hands and knees on a firm surface. If you are on a hard floor, you may want to use padding, such as an exercise mat, to cushion your knees.  Line up your arms and legs. Your hands should be directly below your shoulders, and your knees should be directly below your hips.  Lift your left leg behind you. At the same time, raise your right arm and straighten it in front of you.  Do not lift your leg higher than your hip.  Do not lift your arm higher  than your shoulder.  Keep your abdominal and back muscles tight.  Keep your hips facing the ground.  Do not arch your back.  Keep your balance carefully, and do not hold your breath.  Hold this position for __________ seconds.  Slowly return to the starting position.  Repeat with your right leg and your left arm.  Repeat __________ times. Complete this exercise __________ times a day.  Abdominal set with straight leg raise    Lie on your back on a firm bed or the floor.  Bend one of your knees and keep your other leg straight.  Tense your abdominal muscles and lift your straight leg up, 4-6 inches (10-15 cm) off the ground.  Keep your abdominal muscles tight and hold this position for __________ seconds.  Do not hold your breath.  Do not arch your back. Keep it flat against the ground.  Keep your abdominal muscles tense as you slowly lower your leg back to the starting position.  Repeat with your other leg.  Repeat __________ times. Complete this exercise __________ times a day.  Single leg lower with bent knees  Lie on your back on a firm bed or the floor.  Tense your abdominal muscles and lift your feet off the floor, one foot at a time, so your knees and hips are bent in 90-degree angles (right angles).  Your knees should be over your hips and your lower legs should be parallel to the floor.  Keeping your abdominal muscles tense and your knee bent, slowly lower one of your legs so your toe touches the ground.  Lift your leg back up to return to the starting position.  Do not hold your breath.  Do not let your back arch. Keep your back flat against the ground.  Repeat with your other leg.  Repeat __________ times. Complete this exercise __________ times a day.  Posture and body mechanics  Good posture and healthy body mechanics can help to relieve stress in your body's tissues and joints. Body mechanics refers to the movements and positions of your body while you do your daily activities. Posture is part of body  mechanics. Good posture means:  Your spine is in its natural S-curve position (neutral).  Your shoulders are pulled back slightly.  Your head is not tipped forward (neutral).  Follow these guidelines to improve your posture and body mechanics in your everyday activities.  Standing    When standing, keep your spine neutral and your feet about hip-width apart. Keep a slight bend in your knees. Your ears, shoulders, and hips should line up.  When you do a task in which you  one place for a long time, place one foot up on a stable object that is 2-4 inches (5-10 cm) high, such as a footstool. This helps keep your spine neutral.  Sitting    When sitting, keep your spine neutral and keep your feet flat on the floor. Use a footrest, if necessary, and keep your thighs parallel to the floor. Avoid rounding your shoulders, and avoid tilting your head forward.  When working at a desk or a computer, keep your desk at a height where your hands are slightly lower than your elbows. Slide your chair under your desk so you are close enough to maintain good posture.  When working at a computer, place your monitor at a height where you are looking straight ahead and you do not have to tilt your head forward or downward to look at the screen.  Resting  When lying down and resting, avoid positions that are most painful for you.  If you have pain with activities such as sitting, bending, stooping, or squatting, lie in a position in which your body does not bend very much. For example, avoid curling up on your side with your arms and knees near your chest (fetal position).  If you have pain with activities such as standing for a long time or reaching with your arms, lie with your spine in a neutral position and bend your knees slightly. Try the following positions:  Lying on your side with a pillow between your knees.  Lying on your back with a pillow under your knees.  Lifting    When lifting objects, keep your feet at least  shoulder-width apart and tighten your abdominal muscles.  Bend your knees and hips and keep your spine neutral. It is important to lift using the strength of your legs, not your back. Do not lock your knees straight out.  Always ask for help to lift heavy or awkward objects.  This information is not intended to replace advice given to you by your health care provider. Make sure you discuss any questions you have with your health care provider.  Document Revised: 03/07/2022 Document Reviewed: 03/07/2022  [employer]vier Patient Education © 2024 [employer]

## 2024-07-26 NOTE — ASSESSMENT & PLAN NOTE
Acute on chronic low back pain for the past 1.5 weeks, likely due to acute muscle strain.  I am going to send her in a muscle relaxer as below to try.  She will continue her baseline meloxicam, tramadol and duloxetine.  She may also continue use of Tylenol Arthritis over-the-counter as well.  Conservative therapy recommended with alternating heat and ice in addition to gentle stretching and activity.  Low back exercises handout given today.  Call or return to clinic as needed worsening or failure to improve of symptoms.  We would consider Physical Therapy at that time.

## 2024-07-26 NOTE — PROGRESS NOTES
Chief Complaint  Back Pain (X 1 week, pain across back going into hip area )    Subjective          Addie Yates presents to [facility] today for acute on chronic low back pain.    About a week and a half ago, she was out workign in her flower bed when she noticed that her back started hurting.  She stopped and went in to put a topical rub on it, then went back out.  The pain worsened so she stopped.  She tried heat and then ice but nothing worked.  She called here and talked to Yasmin who recommended Tylenol Arthritis.  Addie tried that for two days with no improvement.  The pain is sharp, across the low back bilaterally, radiating down into the bilateral hips and groin.  Pain is worst with bending over.  Any kind of activity worsens.  She locks up if she lies or sits down for too long.  No weakness or numbness or tingling in the legs.  No loss of control of bowel or bladder.  Today, the left back seems a bit worse than the right (which seems to be a bit better than it was).    She is on tramadol, meloxicam and duloxetine at baseline for longstanding neck and low back pain due to diffuse osteoarthritis and scoliosis.  Denies adverse effects.  She has previously been on gabapentin (ineffective).  In the past, she has followed with Physical Therapy, a chiropractor, and [facility] for injections (ineffective).       Current Outpatient Medications:     aspirin 81 MG EC tablet, Take 1 tablet by mouth Every Night., Disp: , Rfl:     atorvastatin (LIPITOR) 80 MG tablet, Take 1 tablet by mouth Daily., Disp: , Rfl:     busPIRone (BUSPAR) 5 MG tablet, Take 1 tablet by mouth 2 (Two) Times a Day., Disp: 180 tablet, Rfl: 1    Calcium Citrate-Vitamin D3 (CITRACAL) 315-6.25 MG-MCG tablet tablet, Take  by mouth Daily., Disp: , Rfl:     docusate sodium (COLACE) 100 MG capsule, Take 1 capsule by mouth 2 (Two) Times a Day., Disp: , Rfl:     DULoxetine (CYMBALTA) 60 MG capsule, Take 1  capsule by mouth Daily., Disp: 90 capsule, Rfl: 1    famotidine (PEPCID) 40 MG tablet, Take 1 tablet by mouth Every Night., Disp: 30 tablet, Rfl: 5    ketoconazole (NIZORAL) 2 % shampoo, SHAMPOO SCALP 2-3 TIMES WEEKLY AS NEEDED. ALTERNATE WITH OTHER SHAMPOO, Disp: , Rfl:     levothyroxine (SYNTHROID, LEVOTHROID) 100 MCG tablet, Take 1 tablet by mouth Daily., Disp: 90 tablet, Rfl: 1    lisinopril (PRINIVIL,ZESTRIL) 5 MG tablet, Take 1 tablet by mouth Every Night., Disp: , Rfl:     meloxicam (MOBIC) 15 MG tablet, TAKE ONE TABLET BY MOUTH DAILY AS NEEDED FOR PAIN, Disp: 30 tablet, Rfl: 1    nitroglycerin (Nitrostat) 0.4 MG SL tablet, Place 1 tablet under the tongue Every 5 (Five) Minutes As Needed for Chest Pain. Take no more than 3 doses in 15 minutes., Disp: 20 tablet, Rfl: 0    omeprazole (priLOSEC) 40 MG capsule, Take 1 capsule by mouth Daily., Disp: 90 capsule, Rfl: 1    Prolia 60 MG/ML solution prefilled syringe syringe, INJECT 60MG SUBCUTANEOUSLY EVERY 6 MONTHS, Disp: 1 mL, Rfl: 0    saccharomyces boulardii (FLORASTOR) 250 MG capsule, Take 1 capsule by mouth Daily., Disp: , Rfl:     simethicone (MYLICON,GAS-X) 125 MG capsule capsule, Take 1 capsule by mouth As Needed., Disp: , Rfl:     traMADol (ULTRAM) 50 MG tablet, Take 1 tablet by mouth 2 (Two) Times a Day As Needed for Moderate Pain., Disp: 30 tablet, Rfl: 0    tiZANidine (ZANAFLEX) 4 MG tablet, Take 1 tablet by mouth 2 (Two) Times a Day As Needed for Muscle Spasms., Disp: 40 tablet, Rfl: 0    Current Facility-Administered Medications:     denosumab (PROLIA) syringe 60 mg, 60 mg, Subcutaneous, Q6 Months, Jammie hPillips MD, 60 mg at 08/16/23 0926  There are no discontinued medications.      Allergies:  Patient has no known allergies.      Objective   Vital Signs:   Vitals:    07/26/24 0900   BP: 135/87   BP Location: Left arm   Patient Position: Sitting   Cuff Size: Adult   Pulse: 64   Temp: 97.8 °F (36.6 °C)   TempSrc: Oral   SpO2: 94%   Weight:  "66.7 kg (147 lb)   Height: 162.6 cm (64.02\")     Body mass index is 25.22 kg/m².           Physical Exam  Constitutional:       Appearance: Normal appearance.   HENT:      Head: Normocephalic and atraumatic.   Eyes:      Extraocular Movements: Extraocular movements intact.      Conjunctiva/sclera: Conjunctivae normal.   Pulmonary:      Effort: Pulmonary effort is normal. No respiratory distress.   Musculoskeletal:         General: Tenderness (L lumbar paraspinals) present. Normal range of motion.   Skin:     General: Skin is warm and dry.   Neurological:      General: No focal deficit present.      Mental Status: She is alert and oriented to person, place, and time.   Psychiatric:         Mood and Affect: Mood normal.         Behavior: Behavior normal.         Thought Content: Thought content normal.         Judgment: Judgment normal.           Lab Results   Component Value Date    GLUCOSE 103 (H) 06/19/2024    BUN 28 (H) 06/19/2024    CREATININE 0.89 06/19/2024    EGFRIFNONA 78 12/29/2021    EGFRIFAFRI 76 02/28/2018    BCR 31.5 (H) 06/19/2024    K 4.8 06/19/2024    CO2 30.0 (H) 06/19/2024    CALCIUM 9.4 06/19/2024    ALBUMIN 4.2 06/19/2024    LABIL2 2.0 04/15/2021    AST 17 06/19/2024    ALT 15 06/19/2024       Lab Results   Component Value Date    CHOL 152 06/19/2024    CHLPL 135 04/15/2021    TRIG 50 06/19/2024    HDL 70 (H) 06/19/2024    LDL 71 06/19/2024       Lab Results   Component Value Date    WBC 5.68 06/19/2024    HGB 13.2 06/19/2024    HCT 42.3 06/19/2024    MCV 96.1 06/19/2024     06/19/2024            Assessment and Plan    Assessment & Plan  Chronic bilateral low back pain with bilateral sciatica  Acute on chronic low back pain for the past 1.5 weeks, likely due to acute muscle strain.  I am going to send her in a muscle relaxer as below to try.  She will continue her baseline meloxicam, tramadol and duloxetine.  She may also continue use of Tylenol Arthritis over-the-counter as well.  " Conservative therapy recommended with alternating heat and ice in addition to gentle stretching and activity.  Low back exercises handout given today.  Call or return to clinic as needed worsening or failure to improve of symptoms.  We would consider Physical Therapy at that time.       New Medications Ordered This Visit   Medications    tiZANidine (ZANAFLEX) 4 MG tablet     Sig: Take 1 tablet by mouth 2 (Two) Times a Day As Needed for Muscle Spasms.     Dispense:  40 tablet     Refill:  0           Follow Up   Return if symptoms worsen or fail to improve, for or as scheduled 9/27/2024.  Patient was given instructions and counseling regarding her condition or for health maintenance advice. Please see specific information pulled into the AVS if appropriate.           07/26/2024

## 2024-08-01 DIAGNOSIS — M15.9 GENERALIZED OSTEOARTHRITIS: ICD-10-CM

## 2024-08-02 RX ORDER — TRAMADOL HYDROCHLORIDE 50 MG/1
50 TABLET ORAL 2 TIMES DAILY PRN
Qty: 60 TABLET | Refills: 0 | Status: SHIPPED | OUTPATIENT
Start: 2024-08-02

## 2024-08-09 ENCOUNTER — OFFICE VISIT (OUTPATIENT)
Dept: FAMILY MEDICINE CLINIC | Age: 64
End: 2024-08-09
Payer: MEDICARE

## 2024-08-09 ENCOUNTER — HOSPITAL ENCOUNTER (OUTPATIENT)
Dept: GENERAL RADIOLOGY | Facility: HOSPITAL | Age: 64
Discharge: HOME | End: 2024-08-09
Payer: MEDICARE

## 2024-08-09 VITALS
BODY MASS INDEX: 25.1 KG/M2 | WEIGHT: 147 LBS | OXYGEN SATURATION: 97 % | HEIGHT: 64 IN | HEART RATE: 68 BPM | DIASTOLIC BLOOD PRESSURE: 83 MMHG | TEMPERATURE: 98.1 F | SYSTOLIC BLOOD PRESSURE: 122 MMHG

## 2024-08-09 DIAGNOSIS — M54.41 CHRONIC BILATERAL LOW BACK PAIN WITH BILATERAL SCIATICA: ICD-10-CM

## 2024-08-09 DIAGNOSIS — G89.29 CHRONIC BILATERAL LOW BACK PAIN WITH BILATERAL SCIATICA: ICD-10-CM

## 2024-08-09 DIAGNOSIS — M54.42 CHRONIC BILATERAL LOW BACK PAIN WITH BILATERAL SCIATICA: ICD-10-CM

## 2024-08-09 DIAGNOSIS — N30.00 ACUTE CYSTITIS WITHOUT HEMATURIA: Primary | ICD-10-CM

## 2024-08-09 LAB
BILIRUB BLD-MCNC: NEGATIVE MG/DL
CLARITY, POC: CLEAR
COLOR UR: YELLOW
EXPIRATION DATE: ABNORMAL
GLUCOSE UR STRIP-MCNC: NEGATIVE MG/DL
KETONES UR QL: NEGATIVE
LEUKOCYTE EST, POC: NEGATIVE
Lab: ABNORMAL
NITRITE UR-MCNC: NEGATIVE MG/ML
PH UR: 7 [PH] (ref 5–8)
PROT UR STRIP-MCNC: NEGATIVE MG/DL
RBC # UR STRIP: ABNORMAL /UL
SP GR UR: 1.02 (ref 1–1.03)
UROBILINOGEN UR QL: ABNORMAL

## 2024-08-09 PROCEDURE — 3074F SYST BP LT 130 MM HG: CPT | Performed by: NURSE PRACTITIONER

## 2024-08-09 PROCEDURE — 72100 X-RAY EXAM L-S SPINE 2/3 VWS: CPT

## 2024-08-09 PROCEDURE — 81003 URINALYSIS AUTO W/O SCOPE: CPT | Performed by: NURSE PRACTITIONER

## 2024-08-09 PROCEDURE — 99213 OFFICE O/P EST LOW 20 MIN: CPT | Performed by: NURSE PRACTITIONER

## 2024-08-09 PROCEDURE — 1159F MED LIST DOCD IN RCRD: CPT | Performed by: NURSE PRACTITIONER

## 2024-08-09 PROCEDURE — 1160F RVW MEDS BY RX/DR IN RCRD: CPT | Performed by: NURSE PRACTITIONER

## 2024-08-09 PROCEDURE — 3079F DIAST BP 80-89 MM HG: CPT | Performed by: NURSE PRACTITIONER

## 2024-08-09 RX ORDER — CEFDINIR 300 MG/1
300 CAPSULE ORAL
COMMUNITY
Start: 2024-08-03 | End: 2024-08-13

## 2024-08-09 RX ORDER — LIDOCAINE 50 MG/G
1 PATCH TOPICAL EVERY 24 HOURS
Qty: 30 PATCH | Refills: 0 | Status: SHIPPED | OUTPATIENT
Start: 2024-08-09

## 2024-08-09 NOTE — PROGRESS NOTES
Chief Complaint  hospital follw up (08/03/2024 for Acute cystitis with hematuria, not improving , low back pain )    Cecy Yates presents to [facility] today for follow-up from hospital visit on 8/3/2024 with acute cystitis.  UA positive at [facility], treated with cefdinir, which she is currently still taking.  Culture reviewed, on appropriate medication.  However, states that she is not feeling any better, her back is still hurting her, hurts worse with bending over trying to stand up or any activity.  Denies any urinary frequency, burning or urgency.    History of chronic low back problems, taking meloxicam 15 mg daily, tizanidine 4 mg 2 times a day as needed, and tramadol 50 mg 2 times a day as needed.  MRI lumbar spine 5/10/2011 showed annular bulging, multilevel degenerative disc disease bulging disc and annular tears at L3-L4, L4-L5.  Has had injections in her back previously, been to physical therapy, and chiropractory, nothing has been very helpful for her back pain.      Current Outpatient Medications:     aspirin 81 MG EC tablet, Take 1 tablet by mouth Every Night., Disp: , Rfl:     atorvastatin (LIPITOR) 80 MG tablet, Take 1 tablet by mouth Daily., Disp: , Rfl:     busPIRone (BUSPAR) 5 MG tablet, Take 1 tablet by mouth 2 (Two) Times a Day., Disp: 180 tablet, Rfl: 1    Calcium Citrate-Vitamin D3 (CITRACAL) 315-6.25 MG-MCG tablet tablet, Take  by mouth Daily., Disp: , Rfl:     cefdinir (OMNICEF) 300 MG capsule, Take 1 capsule by mouth., Disp: , Rfl:     Cholecalciferol 10 MCG (400 UNIT) capsule, Vitamin D-3, Disp: , Rfl:     docusate sodium (COLACE) 100 MG capsule, Take 1 capsule by mouth 2 (Two) Times a Day., Disp: , Rfl:     DULoxetine (CYMBALTA) 60 MG capsule, Take 1 capsule by mouth Daily., Disp: 90 capsule, Rfl: 1    famotidine (PEPCID) 40 MG tablet, Take 1 tablet by mouth Every Night., Disp: 30 tablet, Rfl: 5    ketoconazole (NIZORAL) 2 % shampoo,  SHAMPOO SCALP 2-3 TIMES WEEKLY AS NEEDED. ALTERNATE WITH OTHER SHAMPOO, Disp: , Rfl:     levothyroxine (SYNTHROID, LEVOTHROID) 100 MCG tablet, Take 1 tablet by mouth Daily., Disp: 90 tablet, Rfl: 1    lisinopril (PRINIVIL,ZESTRIL) 5 MG tablet, Take 1 tablet by mouth Every Night., Disp: , Rfl:     meloxicam (MOBIC) 15 MG tablet, TAKE ONE TABLET BY MOUTH DAILY AS NEEDED FOR PAIN, Disp: 30 tablet, Rfl: 1    nitroglycerin (Nitrostat) 0.4 MG SL tablet, Place 1 tablet under the tongue Every 5 (Five) Minutes As Needed for Chest Pain. Take no more than 3 doses in 15 minutes., Disp: 20 tablet, Rfl: 0    omeprazole (priLOSEC) 40 MG capsule, Take 1 capsule by mouth Daily., Disp: 90 capsule, Rfl: 1    Prolia 60 MG/ML solution prefilled syringe syringe, INJECT 60MG SUBCUTANEOUSLY EVERY 6 MONTHS, Disp: 1 mL, Rfl: 0    simethicone (MYLICON,GAS-X) 125 MG capsule capsule, Take 1 capsule by mouth As Needed., Disp: , Rfl:     tiZANidine (ZANAFLEX) 4 MG tablet, Take 1 tablet by mouth 2 (Two) Times a Day As Needed for Muscle Spasms., Disp: 40 tablet, Rfl: 0    traMADol (ULTRAM) 50 MG tablet, Take 1 tablet by mouth 2 (Two) Times a Day As Needed for Moderate Pain. for pain, Disp: 60 tablet, Rfl: 0    lidocaine (LIDODERM) 5 %, Place 1 patch on the skin as directed by provider Daily. Remove & Discard patch within 12 hours or as directed by MD, Disp: 30 patch, Rfl: 0    saccharomyces boulardii (FLORASTOR) 250 MG capsule, Take 1 capsule by mouth Daily. (Patient not taking: Reported on 8/9/2024), Disp: , Rfl:     Current Facility-Administered Medications:     denosumab (PROLIA) syringe 60 mg, 60 mg, Subcutaneous, Q6 Months, Jammie Phillips MD, 60 mg at 08/16/23 0926  There are no discontinued medications.      Allergies:  Patient has no known allergies.      Objective   Vital Signs:   Vitals:    08/09/24 0956   BP: 122/83   BP Location: Left arm   Patient Position: Sitting   Cuff Size: Adult   Pulse: 68   Temp: 98.1 °F (36.7 °C)  "  TempSrc: Oral   SpO2: 97%   Weight: 66.7 kg (147 lb)   Height: 162.6 cm (64.02\")     Body mass index is 25.22 kg/m².           Physical Exam  Constitutional:       Appearance: Normal appearance.   Neck:      Vascular: No carotid bruit.   Cardiovascular:      Rate and Rhythm: Normal rate and regular rhythm.      Heart sounds: Normal heart sounds.   Pulmonary:      Effort: Pulmonary effort is normal.      Breath sounds: Normal breath sounds.   Musculoskeletal:         General: Tenderness present.      Comments: Low back tenderness across the entire lumbar area, limited range of motion, using hands to walk up her legs to stand   Skin:     General: Skin is warm and dry.   Neurological:      General: No focal deficit present.      Mental Status: She is alert.   Psychiatric:         Mood and Affect: Mood normal.         Behavior: Behavior normal.             Lab Results   Component Value Date    GLUCOSE 103 (H) 06/19/2024    BUN 28 (H) 06/19/2024    CREATININE 0.89 06/19/2024    EGFRIFNONA 78 12/29/2021    EGFRIFAFRI 76 02/28/2018    BCR 31.5 (H) 06/19/2024    K 4.8 06/19/2024    CO2 30.0 (H) 06/19/2024    CALCIUM 9.4 06/19/2024    ALBUMIN 4.2 06/19/2024    LABIL2 2.0 04/15/2021    AST 17 06/19/2024    ALT 15 06/19/2024       Lab Results   Component Value Date    CHOL 152 06/19/2024    CHLPL 135 04/15/2021    TRIG 50 06/19/2024    HDL 70 (H) 06/19/2024    LDL 71 06/19/2024       Lab Results   Component Value Date    WBC 5.68 06/19/2024    HGB 13.2 06/19/2024    HCT 42.3 06/19/2024    MCV 96.1 06/19/2024     06/19/2024     Lab Results (last 24 hours)       Procedure Component Value Units Date/Time    POC Urinalysis Dipstick, Automated [[id number]]  (Abnormal) Collected: 08/09/24 1031    Specimen: Urine Updated: 08/09/24 1032     Color Yellow     Clarity, UA Clear     Specific Gravity  1.025     pH, Urine 7.0     Leukocytes Negative     Nitrite, UA Negative     Protein, POC Negative mg/dL      Glucose, UA Negative " mg/dL      Ketones, UA Negative     Urobilinogen, UA 0.2 E.U./dL     Bilirubin Negative     Blood, UA Trace     Lot Number #[id number]     Expiration Date 10/31/2024                MRI Lumbar Spine Without Contrast (05/10/2011)     Procedures         Diagnoses and all orders for this visit:    1. Acute cystitis without hematuria (Primary)  -     POC Urinalysis Dipstick, Automated    2. Chronic bilateral low back pain with bilateral sciatica  Comments:  Discussed chronic back pain, possible need for referral to physical therapy, or chronic pain management for repeat injections in her back  Orders:  -     XR Spine Lumbar 2 or 3 View; Future  -     lidocaine (LIDODERM) 5 %; Place 1 patch on the skin as directed by provider Daily. Remove & Discard patch within 12 hours or as directed by MD  Dispense: 30 patch; Refill: 0            Follow Up  Return As scheduled.  Patient was given instructions and counseling regarding her condition or for health maintenance advice. Please see specific information pulled into the AVS if appropriate.           Mazin Pelaez, KEVON  08/09/2024    Please note that portions of this document were completed using a voice recognition program.

## 2024-08-12 ENCOUNTER — TELEPHONE (OUTPATIENT)
Dept: FAMILY MEDICINE CLINIC | Age: 64
End: 2024-08-12
Payer: MEDICARE

## 2024-08-12 NOTE — TELEPHONE ENCOUNTER
Caller: Addie Yates    Relationship to patient: Self    Best call back number: [phone]     Patient is needing: PATIENT STATES THAT A PRIOR AUTHORIZATION IS NEEDED FOR HER lidocaine (LIDODERM) 5 % [27799] (Order [id number])       ”

## 2024-08-12 NOTE — PROGRESS NOTES
It appears to be chronic compression fractures L1-L2, clarify if this is new or old.  No other fractures noted but she does have severe degenerative changes throughout the spine which definitely could account her pain

## 2024-08-21 ENCOUNTER — OFFICE VISIT (OUTPATIENT)
Dept: GASTROENTEROLOGY | Facility: CLINIC | Age: 64
End: 2024-08-21
Payer: MEDICARE

## 2024-08-21 VITALS
WEIGHT: 144.7 LBS | BODY MASS INDEX: 24.7 KG/M2 | HEIGHT: 64 IN | SYSTOLIC BLOOD PRESSURE: 132 MMHG | DIASTOLIC BLOOD PRESSURE: 80 MMHG | HEART RATE: 72 BPM

## 2024-08-21 DIAGNOSIS — K59.04 CHRONIC IDIOPATHIC CONSTIPATION: Primary | ICD-10-CM

## 2024-08-21 DIAGNOSIS — K62.82 ANAL DYSPLASIA: ICD-10-CM

## 2024-08-21 DIAGNOSIS — R94.5 ABNORMAL RESULTS OF LIVER FUNCTION STUDIES: ICD-10-CM

## 2024-08-21 DIAGNOSIS — R74.8 ELEVATED ALKALINE PHOSPHATASE LEVEL: ICD-10-CM

## 2024-08-21 RX ORDER — LACTULOSE 10 G/15ML
30 SOLUTION ORAL 2 TIMES DAILY
Qty: 946 ML | Refills: 3 | Status: SHIPPED | OUTPATIENT
Start: 2024-08-21

## 2024-08-21 NOTE — PROGRESS NOTES
Chief Complaint     Constipation (/)    History of Present Illness     Addie Yates is a 64 y.o. female who presents to [facility] for follow-up of IBS-C, gastritis and anal dysplasia.      She is continuing to experience constipation.  She stopped motegrity because she didn't feel it was helping.   She is having a bowel movement about once per week.  Describes stool as a #1 on the bristol stool chart.      She will need repeat anoscopy in March.      She feels that reflux symptoms are controlled with omeprazole and pepcid.      She is experiencing back pain and has a f/u with PCP upcoming for further management.    History      Past Medical History:   Diagnosis Date    Anxiety disorder, unspecified     Atherosclerotic heart disease of native coronary artery without angina pectoris     Carpal tunnel syndrome on right     Chronic pain     BASE OF SKULL/UPPERMOST NECK    Constipation, unspecified     DDD (degenerative disc disease), cervical     DDD (degenerative disc disease), lumbar     Diaphragmatic hernia without obstruction and without gangrene     Dyskinesia of esophagus     Gastroparesis     GERD (gastroesophageal reflux disease)     Glaucoma     Hiatal hernia     Hypothyroidism, unspecified     Long term (current) use of opiate analgesic     Medial epicondylitis     MI (myocardial infarction)     2015    Osteoarthritis     Osteoporosis without current pathological fracture     Other idiopathic scoliosis, site unspecified     Presence of intraocular lens     Primary generalized (osteo)arthritis     Right lateral epicondylitis     Toxic goiter     Unspecified hemorrhoids      Past Surgical History:   Procedure Laterality Date    CARPAL TUNNEL RELEASE Left     COLONOSCOPY      2007, 2010, 2015 - WITH ADENOMA    COLONOSCOPY N/A 9/11/2023    Procedure: COLONOSCOPY WITH POLYPECTOMY AND BIOPSIES;  Surgeon: Yodit Leroy MD;  Location: [facility];  Service:  Gastroenterology;  Laterality: N/A;  COLON POLYP    CORONARY ANGIOPLASTY WITH STENT PLACEMENT  11/2015    ENDOSCOPY      2008, 2016    ENDOSCOPY N/A 11/04/2022    Procedure: ESOPHAGOGASTRODUODENOSCOPY with biopsy;  Surgeon: Yodit Leroy MD;  Location: [facility] ENDOSCOPY;  Service: Gastroenterology;  Laterality: N/A;  gastritis, hiatal hernia    FOOT SURGERY Right 05/15/2023    HYSTERECTOMY  2004    LAPAROSCOPIC CHOLECYSTECTOMY  2017    REFRACTIVE SURGERY Bilateral     THYROID BIOPSY Right     TUBAL ABDOMINAL LIGATION      BI-TUBAL    UPPER GASTROINTESTINAL ENDOSCOPY       Family History   Problem Relation Age of Onset    Leukemia Other     Kidney cancer Other     Diabetes Other     Malig Hyperthermia Neg Hx         Current Medications       Current Outpatient Medications:     aspirin 81 MG EC tablet, Take 1 tablet by mouth Every Night., Disp: , Rfl:     atorvastatin (LIPITOR) 80 MG tablet, Take 1 tablet by mouth Daily., Disp: , Rfl:     busPIRone (BUSPAR) 5 MG tablet, Take 1 tablet by mouth 2 (Two) Times a Day., Disp: 180 tablet, Rfl: 1    Calcium Citrate-Vitamin D3 (CITRACAL) 315-6.25 MG-MCG tablet tablet, Take  by mouth Daily., Disp: , Rfl:     Cholecalciferol 10 MCG (400 UNIT) capsule, Vitamin D-3, Disp: , Rfl:     docusate sodium (COLACE) 100 MG capsule, Take 1 capsule by mouth 2 (Two) Times a Day., Disp: , Rfl:     DULoxetine (CYMBALTA) 60 MG capsule, Take 1 capsule by mouth Daily., Disp: 90 capsule, Rfl: 1    famotidine (PEPCID) 40 MG tablet, Take 1 tablet by mouth Every Night., Disp: 30 tablet, Rfl: 5    ketoconazole (NIZORAL) 2 % shampoo, SHAMPOO SCALP 2-3 TIMES WEEKLY AS NEEDED. ALTERNATE WITH OTHER SHAMPOO, Disp: , Rfl:     levothyroxine (SYNTHROID, LEVOTHROID) 100 MCG tablet, Take 1 tablet by mouth Daily., Disp: 90 tablet, Rfl: 1    lidocaine (LIDODERM) 5 %, Place 1 patch on the skin as directed by provider Daily. Remove & Discard patch within 12 hours or as directed by MD, Disp: 30 patch, Rfl:  "0    lisinopril (PRINIVIL,ZESTRIL) 5 MG tablet, Take 1 tablet by mouth Every Night., Disp: , Rfl:     meloxicam (MOBIC) 15 MG tablet, TAKE ONE TABLET BY MOUTH DAILY AS NEEDED FOR PAIN, Disp: 30 tablet, Rfl: 1    nitroglycerin (Nitrostat) 0.4 MG SL tablet, Place 1 tablet under the tongue Every 5 (Five) Minutes As Needed for Chest Pain. Take no more than 3 doses in 15 minutes., Disp: 20 tablet, Rfl: 0    omeprazole (priLOSEC) 40 MG capsule, Take 1 capsule by mouth Daily., Disp: 90 capsule, Rfl: 1    Prolia 60 MG/ML solution prefilled syringe syringe, INJECT 60MG SUBCUTANEOUSLY EVERY 6 MONTHS, Disp: 1 mL, Rfl: 0    simethicone (MYLICON,GAS-X) 125 MG capsule capsule, Take 1 capsule by mouth As Needed., Disp: , Rfl:     tiZANidine (ZANAFLEX) 4 MG tablet, Take 1 tablet by mouth 2 (Two) Times a Day As Needed for Muscle Spasms., Disp: 40 tablet, Rfl: 0    traMADol (ULTRAM) 50 MG tablet, Take 1 tablet by mouth 2 (Two) Times a Day As Needed for Moderate Pain. for pain, Disp: 60 tablet, Rfl: 0    lactulose (CHRONULAC) 10 GM/15ML solution, Take 30 mL by mouth 2 (Two) Times a Day., Disp: 946 mL, Rfl: 3    saccharomyces boulardii (FLORASTOR) 250 MG capsule, Take 1 capsule by mouth Daily. (Patient not taking: Reported on 8/9/2024), Disp: , Rfl:     Current Facility-Administered Medications:     denosumab (PROLIA) syringe 60 mg, 60 mg, Subcutaneous, Q6 Months, Jammie Phillips MD, 60 mg at 08/16/23 0926     Allergies     No Known Allergies    Social History       Social History     Social History Narrative    Not on file         Objective       /80 (BP Location: Left arm, Patient Position: Sitting, Cuff Size: Adult)   Pulse 72   Ht 162.6 cm (64.02\")   Wt 65.6 kg (144 lb 11.2 oz)   BMI 24.83 kg/m²       Physical Exam  Constitutional:       General: She is not in acute distress.     Appearance: Normal appearance. She is well-developed and normal weight.   HENT:      Head: Normocephalic and atraumatic.   Eyes:      " Conjunctiva/sclera: Conjunctivae normal.      Pupils: Pupils are equal, round, and reactive to light.      Visual Fields: Right eye visual fields normal and left eye visual fields normal.   Cardiovascular:      Rate and Rhythm: Normal rate.   Pulmonary:      Effort: Pulmonary effort is normal. No respiratory distress or retractions.      Breath sounds: Normal air entry.   Musculoskeletal:         General: Normal range of motion.      Right lower leg: No edema.      Left lower leg: No edema.   Skin:     General: Skin is warm and dry.      Findings: No lesion.   Neurological:      General: No focal deficit present.      Mental Status: She is alert and oriented to person, place, and time.   Psychiatric:         Mood and Affect: Mood and affect normal.         Behavior: Behavior normal.         Results       Result Review :    The following data was reviewed by: KEVON Mahan on 08/21/2024:    8/3/2024 CBC: WBC 5, hemoglobin 11.8, hematocrit 36.6, platelets 209.  CMP: Creatinine 0.83, alk phos 170, ALT 24, AST 24, total bilirubin 0.6.  Lipase-50.    CT abdomen and pelvis without contrast-benign cysts throughout the liver.  Previous cholecystectomy.  Moderate amount of stool throughout the colon.  No evidence of kidney stone or obstruction.    3/29/24 High-resolution anoscopy with directed biopsy - Posterior biopsy - High-grade squamous intraepithelial lesion.  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  Peripheral margin is positive for high grade dysplasia.  Recommend repeat in 1 year.     CBC w/diff          12/7/2023    10:51 6/19/2024    08:30   CBC w/Diff   WBC 6.26  5.68    RBC 4.66  4.40    Hemoglobin 13.9  13.2    Hematocrit 43.7  42.3    MCV 93.8  96.1    MCH 29.8  30.0    MCHC 31.8  31.2    RDW 13.5  13.2    Platelets 235  205     Neutrophil Rel % 68.9  58.7    Immature Granulocyte Rel % 0.2  0.2    Lymphocyte Rel % 23.6  31.2    Monocyte Rel % 5.3  7.4    Eosinophil Rel % 1.4  2.1    Basophil Rel % 0.6  0.4      CMP          12/7/2023    10:51 6/19/2024    08:30   CMP   Glucose 80  103    BUN 19  28    Creatinine 0.90  0.89    EGFR 71.5  72.1    Sodium 142  140    Potassium 4.3  4.8    Chloride 106  103    Calcium 9.6  9.4    Total Protein 7.0  6.7    Albumin 4.7  4.2    Globulin 2.3  2.5    Total Bilirubin 0.3  0.2    Alkaline Phosphatase 102  141    AST (SGOT) 26  17    ALT (SGPT) 26  15    Albumin/Globulin Ratio 2.0  1.7    BUN/Creatinine Ratio 21.1  31.5    Anion Gap 8.0  7.0                 Assessment and Plan              Diagnoses and all orders for this visit:    1. Chronic idiopathic constipation (Primary)  -     lactulose (CHRONULAC) 10 GM/15ML solution; Take 30 mL by mouth 2 (Two) Times a Day.  Dispense: 946 mL; Refill: 3    2. Anal dysplasia    3. Elevated alkaline phosphatase level  -     Hepatitis Panel, Acute  -     [facility]  -     Alpha - 1 - Antitrypsin  -     Anti-Smooth Muscle Antibody Titer  -     Immunofixation, Serum; Future  -     Iron Profile; Future  -     Mitochondrial Antibodies, M2  -     Comprehensive Metabolic Panel; Future    4. Abnormal results of liver function studies  -     Hepatitis Panel, Acute          Follow Up     Follow Up   Return in about 6 months (around 2/21/2025) for constipation, elevated liver enzymes.  Patient was given instructions and counseling regarding her condition or for health maintenance advice. Please see specific information pulled into the AVS if appropriate.

## 2024-08-27 ENCOUNTER — LAB (OUTPATIENT)
Dept: LAB | Facility: HOSPITAL | Age: 64
End: 2024-08-27
Payer: MEDICARE

## 2024-08-27 ENCOUNTER — OFFICE VISIT (OUTPATIENT)
Dept: FAMILY MEDICINE CLINIC | Age: 64
End: 2024-08-27
Payer: MEDICARE

## 2024-08-27 ENCOUNTER — TELEPHONE (OUTPATIENT)
Dept: FAMILY MEDICINE CLINIC | Age: 64
End: 2024-08-27

## 2024-08-27 VITALS
SYSTOLIC BLOOD PRESSURE: 128 MMHG | OXYGEN SATURATION: 97 % | TEMPERATURE: 98.1 F | HEART RATE: 74 BPM | BODY MASS INDEX: 24.82 KG/M2 | DIASTOLIC BLOOD PRESSURE: 86 MMHG | WEIGHT: 145.4 LBS | HEIGHT: 64 IN

## 2024-08-27 DIAGNOSIS — R74.8 ELEVATED ALKALINE PHOSPHATASE LEVEL: ICD-10-CM

## 2024-08-27 DIAGNOSIS — M54.42 CHRONIC BILATERAL LOW BACK PAIN WITH BILATERAL SCIATICA: ICD-10-CM

## 2024-08-27 DIAGNOSIS — M54.41 CHRONIC BILATERAL LOW BACK PAIN WITH BILATERAL SCIATICA: ICD-10-CM

## 2024-08-27 DIAGNOSIS — M48.56XA NONTRAUMATIC COMPRESSION FRACTURE OF L1 VERTEBRA, INITIAL ENCOUNTER: Primary | ICD-10-CM

## 2024-08-27 DIAGNOSIS — G89.29 CHRONIC BILATERAL LOW BACK PAIN WITH BILATERAL SCIATICA: ICD-10-CM

## 2024-08-27 PROCEDURE — 84466 ASSAY OF TRANSFERRIN: CPT

## 2024-08-27 PROCEDURE — 83540 ASSAY OF IRON: CPT

## 2024-08-27 PROCEDURE — 82977 ASSAY OF GGT: CPT | Performed by: NURSE PRACTITIONER

## 2024-08-27 PROCEDURE — 86334 IMMUNOFIX E-PHORESIS SERUM: CPT

## 2024-08-27 PROCEDURE — 80074 ACUTE HEPATITIS PANEL: CPT | Performed by: NURSE PRACTITIONER

## 2024-08-27 PROCEDURE — 36415 COLL VENOUS BLD VENIPUNCTURE: CPT

## 2024-08-27 PROCEDURE — 1160F RVW MEDS BY RX/DR IN RCRD: CPT | Performed by: FAMILY MEDICINE

## 2024-08-27 PROCEDURE — 82103 ALPHA-1-ANTITRYPSIN TOTAL: CPT | Performed by: NURSE PRACTITIONER

## 2024-08-27 PROCEDURE — 86038 ANTINUCLEAR ANTIBODIES: CPT | Performed by: NURSE PRACTITIONER

## 2024-08-27 PROCEDURE — 86015 ACTIN ANTIBODY EACH: CPT | Performed by: NURSE PRACTITIONER

## 2024-08-27 PROCEDURE — 3074F SYST BP LT 130 MM HG: CPT | Performed by: FAMILY MEDICINE

## 2024-08-27 PROCEDURE — 80053 COMPREHEN METABOLIC PANEL: CPT

## 2024-08-27 PROCEDURE — 82784 ASSAY IGA/IGD/IGG/IGM EACH: CPT

## 2024-08-27 PROCEDURE — 3079F DIAST BP 80-89 MM HG: CPT | Performed by: FAMILY MEDICINE

## 2024-08-27 PROCEDURE — 1159F MED LIST DOCD IN RCRD: CPT | Performed by: FAMILY MEDICINE

## 2024-08-27 PROCEDURE — 99214 OFFICE O/P EST MOD 30 MIN: CPT | Performed by: FAMILY MEDICINE

## 2024-08-27 PROCEDURE — G2211 COMPLEX E/M VISIT ADD ON: HCPCS | Performed by: FAMILY MEDICINE

## 2024-08-27 PROCEDURE — 86381 MITOCHONDRIAL ANTIBODY EACH: CPT | Performed by: NURSE PRACTITIONER

## 2024-08-27 NOTE — PROGRESS NOTES
Chief Complaint  Back Pain (Discuss xray of back. )    Subjective          Addie Yates presents to [facility] today for acute on chronic low back pain.    She was last seen about a month ago at which time she was complaining of acute on chronic low back pain.  She does have a complex history of low back pain for which she has previously been to Physical Therapy, chiropractor, and Pain Management.  She was started on muscle relaxer (tizanidine) and instructed to continue conservative management.  She has not had any relief from the muscle relaxer at all.  Pain continued and she therefore saw Mazin Pelaez on 8/9/2024.  X-ray was ordered and showed likely chronic L1 and L2 vertebral compression fractures as well as moderate to severe degenerative changes throughout the lumbar spine.      She also saw Thuy Caldwell who has been monitoring an ongoing increase in her liver enzymes.  Thuy is planning to undertake bloodwork to look at the liver.  Addie is also dealing with constipation on top of everything else.  She is on lactulose BID.     HPI 7-:  About a week and a half ago, she was out working in her flower bed when she noticed that her back started hurting.  She stopped and went in to put a topical rub on it, then went back out.  The pain worsened so she stopped.  She tried heat and then ice but nothing worked.  She called here and talked to Yasmin who recommended Tylenol Arthritis.  Addie tried that for two days with no improvement.  The pain is sharp, across the low back bilaterally, radiating down into the bilateral hips and groin.  Pain is worst with bending over.  Any kind of activity worsens.  She locks up if she lies or sits down for too long.  No weakness or numbness or tingling in the legs.  No loss of control of bowel or bladder.  Today, the left back seems a bit worse than the right (which seems to be a bit better than it was).    She is on tramadol,  meloxicam and duloxetine at baseline for longstanding neck and low back pain due to diffuse osteoarthritis and scoliosis.  Denies adverse effects.  She has previously been on gabapentin (ineffective).  In the past, she has followed with Physical Therapy, a chiropractor, and [facility] for injections (ineffective).       Current Outpatient Medications:     aspirin 81 MG EC tablet, Take 1 tablet by mouth Every Night., Disp: , Rfl:     atorvastatin (LIPITOR) 80 MG tablet, Take 1 tablet by mouth Daily., Disp: , Rfl:     busPIRone (BUSPAR) 5 MG tablet, Take 1 tablet by mouth 2 (Two) Times a Day., Disp: 180 tablet, Rfl: 1    Calcium Citrate-Vitamin D3 (CITRACAL) 315-6.25 MG-MCG tablet tablet, Take  by mouth Daily., Disp: , Rfl:     Cholecalciferol 10 MCG (400 UNIT) capsule, Vitamin D-3, Disp: , Rfl:     docusate sodium (COLACE) 100 MG capsule, Take 1 capsule by mouth 2 (Two) Times a Day., Disp: , Rfl:     DULoxetine (CYMBALTA) 60 MG capsule, Take 1 capsule by mouth Daily., Disp: 90 capsule, Rfl: 1    famotidine (PEPCID) 40 MG tablet, Take 1 tablet by mouth Every Night., Disp: 30 tablet, Rfl: 5    ketoconazole (NIZORAL) 2 % shampoo, SHAMPOO SCALP 2-3 TIMES WEEKLY AS NEEDED. ALTERNATE WITH OTHER SHAMPOO, Disp: , Rfl:     lactulose (CHRONULAC) 10 GM/15ML solution, Take 30 mL by mouth 2 (Two) Times a Day., Disp: 946 mL, Rfl: 3    levothyroxine (SYNTHROID, LEVOTHROID) 100 MCG tablet, Take 1 tablet by mouth Daily., Disp: 90 tablet, Rfl: 1    lisinopril (PRINIVIL,ZESTRIL) 5 MG tablet, Take 1 tablet by mouth Every Night., Disp: , Rfl:     meloxicam (MOBIC) 15 MG tablet, TAKE ONE TABLET BY MOUTH DAILY AS NEEDED FOR PAIN, Disp: 30 tablet, Rfl: 1    nitroglycerin (Nitrostat) 0.4 MG SL tablet, Place 1 tablet under the tongue Every 5 (Five) Minutes As Needed for Chest Pain. Take no more than 3 doses in 15 minutes., Disp: 20 tablet, Rfl: 0    omeprazole (priLOSEC) 40 MG capsule, Take 1 capsule by mouth Daily., Disp: 90  "capsule, Rfl: 1    Prolia 60 MG/ML solution prefilled syringe syringe, INJECT 60MG SUBCUTANEOUSLY EVERY 6 MONTHS, Disp: 1 mL, Rfl: 0    saccharomyces boulardii (FLORASTOR) 250 MG capsule, Take 1 capsule by mouth Daily., Disp: , Rfl:     simethicone (MYLICON,GAS-X) 125 MG capsule capsule, Take 1 capsule by mouth As Needed., Disp: , Rfl:     traMADol (ULTRAM) 50 MG tablet, Take 1 tablet by mouth 2 (Two) Times a Day As Needed for Moderate Pain. for pain, Disp: 60 tablet, Rfl: 0    Current Facility-Administered Medications:     denosumab (PROLIA) syringe 60 mg, 60 mg, Subcutaneous, Q6 Months, Jammie Phillips MD, 60 mg at 08/16/23 0926  Medications Discontinued During This Encounter   Medication Reason    tiZANidine (ZANAFLEX) 4 MG tablet Not Efficacious    lidocaine (LIDODERM) 5 % Availability         Allergies:  Patient has no known allergies.      Objective   Vital Signs:   Vitals:    08/27/24 1100   BP: 128/86   BP Location: Left arm   Patient Position: Sitting   Cuff Size: Adult   Pulse: 74   Temp: 98.1 °F (36.7 °C)   TempSrc: Oral   SpO2: 97%   Weight: 66 kg (145 lb 6.4 oz)   Height: 162.6 cm (64.02\")     Body mass index is 24.95 kg/m².           Physical Exam  Constitutional:       Appearance: Normal appearance.   HENT:      Head: Normocephalic and atraumatic.   Eyes:      Extraocular Movements: Extraocular movements intact.      Conjunctiva/sclera: Conjunctivae normal.   Pulmonary:      Effort: Pulmonary effort is normal. No respiratory distress.   Musculoskeletal:         General: Tenderness (L lumbar paraspinals) present. Normal range of motion.   Skin:     General: Skin is warm and dry.   Neurological:      General: No focal deficit present.      Mental Status: She is alert and oriented to person, place, and time.   Psychiatric:         Mood and Affect: Mood normal.         Behavior: Behavior normal.         Thought Content: Thought content normal.         Judgment: Judgment normal.           Lab " Results   Component Value Date    GLUCOSE 103 (H) 06/19/2024    BUN 28 (H) 06/19/2024    CREATININE 0.89 06/19/2024    EGFRIFNONA 78 12/29/2021    EGFRIFAFRI 76 02/28/2018    BCR 31.5 (H) 06/19/2024    K 4.8 06/19/2024    CO2 30.0 (H) 06/19/2024    CALCIUM 9.4 06/19/2024    ALBUMIN 4.2 06/19/2024    LABIL2 2.0 04/15/2021    AST 17 06/19/2024    ALT 15 06/19/2024       Lab Results   Component Value Date    CHOL 152 06/19/2024    CHLPL 135 04/15/2021    TRIG 50 06/19/2024    HDL 70 (H) 06/19/2024    LDL 71 06/19/2024       Lab Results   Component Value Date    WBC 5.68 06/19/2024    HGB 13.2 06/19/2024    HCT 42.3 06/19/2024    MCV 96.1 06/19/2024     06/19/2024            Assessment and Plan    Assessment & Plan  Nontraumatic compression fracture of L1 vertebra, initial encounter  Probably chronic compression fractures of L1 and L2 vertebrae identified on L-spine XR 8/9/2024.  She has longstanding history of chronic back pain, but pain has been acutely worsened for the past 4 to 6 weeks.  Obtaining MRI L-spine and sending her to [facility] for further evaluation and treatment.  Continue baseline tramadol, meloxicam and duloxetine.  Okay to discontinue muscle relaxers as these were ineffective.  Continue applying ice and heat to the low back alternating.  She has previously undergone Physical Therapy and referral to Pain Management.  Chronic bilateral low back pain with bilateral sciatica  As above.    Orders Placed This Encounter   Procedures    MRI Lumbar Spine Without Contrast    Ambulatory Referral to Orthopedic Surgery                Follow Up   Return if symptoms worsen or fail to improve, for or as scheduled 9/27/2024.  Patient was given instructions and counseling regarding her condition or for health maintenance advice. Please see specific information pulled into the AVS if appropriate.           07/26/2024

## 2024-08-27 NOTE — ASSESSMENT & PLAN NOTE
Probably chronic compression fractures of L1 and L2 vertebrae identified on L-spine XR 8/9/2024.  She has longstanding history of chronic back pain, but pain has been acutely worsened for the past 4 to 6 weeks.  Obtaining MRI L-spine and sending her to [facility] for further evaluation and treatment.  Continue baseline tramadol, meloxicam and duloxetine.  Okay to discontinue muscle relaxers as these were ineffective.  Continue applying ice and heat to the low back alternating.  She has previously undergone Physical Therapy and referral to Pain Management.

## 2024-08-27 NOTE — TELEPHONE ENCOUNTER
Pt due for Prolia anytime last injection 8/2023    Last Dexa 2/22/2023 T score is -1.1  DEXA Bone Density Axial (02/22/2023 08:54)     Last CMP- Calcium pending completed today     Last office Visit 8/27/24  Office Visit with Jammie Phillips MD (08/27/2024)     Ok to proceed with ordering Prolia? Please advise   Summary of benefits requested

## 2024-08-28 LAB
ALBUMIN SERPL-MCNC: 4.4 G/DL (ref 3.5–5.2)
ALBUMIN/GLOB SERPL: 1.5 G/DL
ALP SERPL-CCNC: 212 U/L (ref 39–117)
ALPHA1 GLOB MFR UR ELPH: 182 MG/DL (ref 90–200)
ALT SERPL W P-5'-P-CCNC: 19 U/L (ref 1–33)
ANION GAP SERPL CALCULATED.3IONS-SCNC: 9.2 MMOL/L (ref 5–15)
AST SERPL-CCNC: 24 U/L (ref 1–32)
BILIRUB SERPL-MCNC: 0.4 MG/DL (ref 0–1.2)
BUN SERPL-MCNC: 18 MG/DL (ref 8–23)
BUN/CREAT SERPL: 19.8 (ref 7–25)
CALCIUM SPEC-SCNC: 10.2 MG/DL (ref 8.6–10.5)
CHLORIDE SERPL-SCNC: 102 MMOL/L (ref 98–107)
CO2 SERPL-SCNC: 27.8 MMOL/L (ref 22–29)
CREAT SERPL-MCNC: 0.91 MG/DL (ref 0.57–1)
EGFRCR SERPLBLD CKD-EPI 2021: 70.2 ML/MIN/1.73
GLOBULIN UR ELPH-MCNC: 2.9 GM/DL
GLUCOSE SERPL-MCNC: 93 MG/DL (ref 65–99)
HAV IGM SERPL QL IA: NORMAL
HBV CORE IGM SERPL QL IA: NORMAL
HBV SURFACE AG SERPL QL IA: NORMAL
HCV AB SER QL: NORMAL
IRON 24H UR-MRATE: 85 MCG/DL (ref 37–145)
IRON SATN MFR SERPL: 24 % (ref 20–50)
POTASSIUM SERPL-SCNC: 4.2 MMOL/L (ref 3.5–5.2)
PROT SERPL-MCNC: 7.3 G/DL (ref 6–8.5)
SODIUM SERPL-SCNC: 139 MMOL/L (ref 136–145)
TIBC SERPL-MCNC: 358 MCG/DL (ref 298–536)
TRANSFERRIN SERPL-MCNC: 240 MG/DL (ref 200–360)

## 2024-08-29 LAB
ANA SER QL: NEGATIVE
IGA SERPL-MCNC: 114 MG/DL (ref 87–352)
IGG SERPL-MCNC: 834 MG/DL (ref 586–1602)
IGM SERPL-MCNC: 118 MG/DL (ref 26–217)
MITOCHONDRIA M2 IGG SER-ACNC: <20 UNITS (ref 0–20)
PROT PATTERN SERPL IFE-IMP: NORMAL
SMA IGG SER-ACNC: 5 UNITS (ref 0–19)

## 2024-08-30 ENCOUNTER — TELEPHONE (OUTPATIENT)
Dept: GASTROENTEROLOGY | Facility: CLINIC | Age: 64
End: 2024-08-30
Payer: MEDICARE

## 2024-08-30 DIAGNOSIS — R74.8 ELEVATED ALKALINE PHOSPHATASE LEVEL: Primary | ICD-10-CM

## 2024-08-30 LAB — GGT SERPL-CCNC: 11 U/L (ref 5–36)

## 2024-08-30 NOTE — TELEPHONE ENCOUNTER
----- Message from Loren Caldwell sent at 8/30/2024 11:04 AM EDT -----  GGT normal.  Recommend f/u with PCP regarding elevated alk phos as it doesn’t seem to be associated with the liver.

## 2024-08-30 NOTE — TELEPHONE ENCOUNTER
----- Message from Loren Akiladevon sent at 8/30/2024  9:59 AM EDT -----   Please let pt know that liver workup is negative for autoimmune liver disease.  Her alk phos is increased from previous.  I would like to add a GGT to determine if alk phos elevation is from liver or another cause.  I have added this order.  Please contact lab to have this done.

## 2024-09-04 DIAGNOSIS — M15.9 GENERALIZED OSTEOARTHRITIS: ICD-10-CM

## 2024-09-04 RX ORDER — MELOXICAM 15 MG/1
15 TABLET ORAL DAILY PRN
Qty: 30 TABLET | Refills: 4 | Status: SHIPPED | OUTPATIENT
Start: 2024-09-04

## 2024-09-04 RX ORDER — TRAMADOL HYDROCHLORIDE 50 MG/1
50 TABLET ORAL 2 TIMES DAILY PRN
Qty: 60 TABLET | Refills: 0 | Status: SHIPPED | OUTPATIENT
Start: 2024-09-04

## 2024-09-05 ENCOUNTER — TELEPHONE (OUTPATIENT)
Dept: GASTROENTEROLOGY | Facility: CLINIC | Age: 64
End: 2024-09-05
Payer: MEDICARE

## 2024-09-05 NOTE — TELEPHONE ENCOUNTER
Pt left voicemail stating she has been taking Lactulose and it does not seem to be helping her symptoms. Please advise

## 2024-09-06 RX ORDER — PLECANATIDE 3 MG/1
3 TABLET ORAL DAILY
Qty: 90 TABLET | Refills: 1 | Status: SHIPPED | OUTPATIENT
Start: 2024-09-06

## 2024-09-09 ENCOUNTER — TELEPHONE (OUTPATIENT)
Dept: GASTROENTEROLOGY | Facility: CLINIC | Age: 64
End: 2024-09-09
Payer: MEDICARE

## 2024-09-09 NOTE — TELEPHONE ENCOUNTER
Submitted PA for Trulance on covermymeds. Key: [id number]. It is approved until 09/09/2025. Faxed approval to [facility].

## 2024-09-20 ENCOUNTER — HOSPITAL ENCOUNTER (OUTPATIENT)
Dept: MRI IMAGING | Facility: HOSPITAL | Age: 64
Discharge: HOME | End: 2024-09-20
Payer: MEDICARE

## 2024-09-20 ENCOUNTER — TELEPHONE (OUTPATIENT)
Dept: FAMILY MEDICINE CLINIC | Age: 64
End: 2024-09-20
Payer: MEDICARE

## 2024-09-20 DIAGNOSIS — M54.41 CHRONIC BILATERAL LOW BACK PAIN WITH BILATERAL SCIATICA: ICD-10-CM

## 2024-09-20 DIAGNOSIS — G89.29 CHRONIC BILATERAL LOW BACK PAIN WITH BILATERAL SCIATICA: ICD-10-CM

## 2024-09-20 DIAGNOSIS — M48.56XA NONTRAUMATIC COMPRESSION FRACTURE OF L1 VERTEBRA, INITIAL ENCOUNTER: ICD-10-CM

## 2024-09-20 DIAGNOSIS — M54.42 CHRONIC BILATERAL LOW BACK PAIN WITH BILATERAL SCIATICA: ICD-10-CM

## 2024-09-20 PROCEDURE — 72148 MRI LUMBAR SPINE W/O DYE: CPT

## 2024-09-27 ENCOUNTER — OFFICE VISIT (OUTPATIENT)
Dept: FAMILY MEDICINE CLINIC | Age: 64
End: 2024-09-27
Payer: MEDICARE

## 2024-09-27 VITALS
WEIGHT: 146.6 LBS | OXYGEN SATURATION: 92 % | SYSTOLIC BLOOD PRESSURE: 124 MMHG | DIASTOLIC BLOOD PRESSURE: 85 MMHG | BODY MASS INDEX: 25.03 KG/M2 | HEIGHT: 64 IN | TEMPERATURE: 98.5 F | HEART RATE: 68 BPM

## 2024-09-27 DIAGNOSIS — M54.41 CHRONIC BILATERAL LOW BACK PAIN WITH BILATERAL SCIATICA: Primary | ICD-10-CM

## 2024-09-27 DIAGNOSIS — I10 ESSENTIAL HYPERTENSION: ICD-10-CM

## 2024-09-27 DIAGNOSIS — E78.2 MIXED HYPERLIPIDEMIA: ICD-10-CM

## 2024-09-27 DIAGNOSIS — R74.8 ELEVATED ALKALINE PHOSPHATASE LEVEL: ICD-10-CM

## 2024-09-27 DIAGNOSIS — Z23 ENCOUNTER FOR IMMUNIZATION: ICD-10-CM

## 2024-09-27 DIAGNOSIS — I25.10 CORONARY ARTERY DISEASE INVOLVING NATIVE CORONARY ARTERY OF NATIVE HEART WITHOUT ANGINA PECTORIS: ICD-10-CM

## 2024-09-27 DIAGNOSIS — M54.42 CHRONIC BILATERAL LOW BACK PAIN WITH BILATERAL SCIATICA: Primary | ICD-10-CM

## 2024-09-27 DIAGNOSIS — F41.1 GENERALIZED ANXIETY DISORDER: ICD-10-CM

## 2024-09-27 DIAGNOSIS — Z79.899 HIGH RISK MEDICATION USE: ICD-10-CM

## 2024-09-27 DIAGNOSIS — K58.1 IRRITABLE BOWEL SYNDROME WITH CONSTIPATION: ICD-10-CM

## 2024-09-27 DIAGNOSIS — G89.29 CHRONIC BILATERAL LOW BACK PAIN WITH BILATERAL SCIATICA: Primary | ICD-10-CM

## 2024-09-27 DIAGNOSIS — M81.0 AGE-RELATED OSTEOPOROSIS WITHOUT CURRENT PATHOLOGICAL FRACTURE: ICD-10-CM

## 2024-09-27 PROBLEM — R07.9 CHEST PAIN: Status: RESOLVED | Noted: 2023-12-07 | Resolved: 2024-09-27

## 2024-09-27 PROBLEM — M25.532 LEFT WRIST PAIN: Status: RESOLVED | Noted: 2023-12-07 | Resolved: 2024-09-27

## 2024-09-27 PROBLEM — M79.604 PAIN IN BOTH LOWER EXTREMITIES: Status: RESOLVED | Noted: 2021-11-03 | Resolved: 2024-09-27

## 2024-09-27 PROBLEM — M79.605 PAIN IN BOTH LOWER EXTREMITIES: Status: RESOLVED | Noted: 2021-11-03 | Resolved: 2024-09-27

## 2024-09-27 PROCEDURE — 1159F MED LIST DOCD IN RCRD: CPT | Performed by: FAMILY MEDICINE

## 2024-09-27 PROCEDURE — G0008 ADMIN INFLUENZA VIRUS VAC: HCPCS | Performed by: FAMILY MEDICINE

## 2024-09-27 PROCEDURE — 99214 OFFICE O/P EST MOD 30 MIN: CPT | Performed by: FAMILY MEDICINE

## 2024-09-27 PROCEDURE — 3074F SYST BP LT 130 MM HG: CPT | Performed by: FAMILY MEDICINE

## 2024-09-27 PROCEDURE — 90480 ADMN SARSCOV2 VAC 1/ONLY CMP: CPT | Performed by: FAMILY MEDICINE

## 2024-09-27 PROCEDURE — 91320 SARSCV2 VAC 30MCG TRS-SUC IM: CPT | Performed by: FAMILY MEDICINE

## 2024-09-27 PROCEDURE — 3079F DIAST BP 80-89 MM HG: CPT | Performed by: FAMILY MEDICINE

## 2024-09-27 PROCEDURE — 1160F RVW MEDS BY RX/DR IN RCRD: CPT | Performed by: FAMILY MEDICINE

## 2024-09-27 PROCEDURE — 90662 IIV NO PRSV INCREASED AG IM: CPT | Performed by: FAMILY MEDICINE

## 2024-09-27 RX ORDER — BUTYROSPERMUM PARKII(SHEA BUTTER), SIMMONDSIA CHINENSIS (JOJOBA) SEED OIL, ALOE BARBADENSIS LEAF EXTRACT .01; 1; 3.5 G/100G; G/100G; G/100G
1 LIQUID TOPICAL EVERY OTHER DAY
COMMUNITY

## 2024-10-07 DIAGNOSIS — M15.9 GENERALIZED OSTEOARTHRITIS: ICD-10-CM

## 2024-10-07 RX ORDER — TRAMADOL HYDROCHLORIDE 50 MG/1
50 TABLET ORAL 2 TIMES DAILY PRN
Qty: 30 TABLET | Refills: 0 | Status: SHIPPED | OUTPATIENT
Start: 2024-10-07

## 2024-10-07 NOTE — TELEPHONE ENCOUNTER
Sent to Dr Agarwal on call for Dr Phillips.  She is not back in office until 10/15/24.     Last tox 9/27/24 LF 9/4/24

## 2024-10-11 ENCOUNTER — HOSPITAL ENCOUNTER (OUTPATIENT)
Dept: NUCLEAR MEDICINE | Facility: HOSPITAL | Age: 64
Discharge: HOME | End: 2024-10-11
Payer: MEDICARE

## 2024-10-11 DIAGNOSIS — R74.8 ELEVATED ALKALINE PHOSPHATASE LEVEL: ICD-10-CM

## 2024-10-11 PROCEDURE — A9503 TC99M MEDRONATE: HCPCS | Performed by: FAMILY MEDICINE

## 2024-10-11 PROCEDURE — 0 TECHNETIUM MEDRONATE KIT: Performed by: FAMILY MEDICINE

## 2024-10-11 PROCEDURE — 78306 BONE IMAGING WHOLE BODY: CPT

## 2024-10-11 RX ORDER — TC 99M MEDRONATE 20 MG/10ML
21.8 INJECTION, POWDER, LYOPHILIZED, FOR SOLUTION INTRAVENOUS
Status: COMPLETED | OUTPATIENT
Start: 2024-10-11 | End: 2024-10-11

## 2024-10-11 RX ADMIN — TC 99M MEDRONATE 21.8 MILLICURIE: 20 INJECTION, POWDER, LYOPHILIZED, FOR SOLUTION INTRAVENOUS at 12:00

## 2024-10-15 DIAGNOSIS — S22.000D COMPRESSION FRACTURE OF THORACIC VERTEBRA WITH ROUTINE HEALING, UNSPECIFIED THORACIC VERTEBRAL LEVEL, SUBSEQUENT ENCOUNTER: Primary | ICD-10-CM

## 2024-10-28 DIAGNOSIS — M15.9 GENERALIZED OSTEOARTHRITIS: ICD-10-CM

## 2024-10-29 RX ORDER — TRAMADOL HYDROCHLORIDE 50 MG/1
50 TABLET ORAL 2 TIMES DAILY PRN
Qty: 60 TABLET | Refills: 0 | Status: SHIPPED | OUTPATIENT
Start: 2024-10-29

## 2024-10-30 ENCOUNTER — OFFICE VISIT (OUTPATIENT)
Dept: FAMILY MEDICINE CLINIC | Age: 64
End: 2024-10-30
Payer: MEDICARE

## 2024-10-30 ENCOUNTER — TELEPHONE (OUTPATIENT)
Dept: FAMILY MEDICINE CLINIC | Age: 64
End: 2024-10-30
Payer: MEDICARE

## 2024-10-30 VITALS
HEIGHT: 64 IN | OXYGEN SATURATION: 95 % | WEIGHT: 145 LBS | SYSTOLIC BLOOD PRESSURE: 137 MMHG | BODY MASS INDEX: 24.75 KG/M2 | DIASTOLIC BLOOD PRESSURE: 92 MMHG | TEMPERATURE: 97.9 F | HEART RATE: 68 BPM

## 2024-10-30 DIAGNOSIS — L02.419 AXILLARY ABSCESS: Primary | ICD-10-CM

## 2024-10-30 PROCEDURE — 3080F DIAST BP >= 90 MM HG: CPT | Performed by: NURSE PRACTITIONER

## 2024-10-30 PROCEDURE — 3075F SYST BP GE 130 - 139MM HG: CPT | Performed by: NURSE PRACTITIONER

## 2024-10-30 PROCEDURE — 99213 OFFICE O/P EST LOW 20 MIN: CPT | Performed by: NURSE PRACTITIONER

## 2024-10-30 RX ORDER — DOXYCYCLINE 100 MG/1
100 CAPSULE ORAL 2 TIMES DAILY
Qty: 20 CAPSULE | Refills: 0 | Status: SHIPPED | OUTPATIENT
Start: 2024-10-30 | End: 2024-11-09

## 2024-10-30 NOTE — TELEPHONE ENCOUNTER
Caller: Addie Yates     Relationship: [unfilled]     Best call back number: [phone]     What is your medical concern? BOIL UNDER RIGHT ARM PIT ABOUT THE SIZE OF A QUARTER THAT IS DRAINING OUT BLOOD AND YELLOW STUFF. HOLE IN CENTER AND TINY HOLES AROUND EDGE. SECOND RED SPOT IS ABOVE IT. PAINFUL TO TOUCH OR TO RAISE ARM. BEEN USING HOT COMPRESS AND ANTISEPTIC ON IT WITH GUAZE.     How long has this issue been going on? WEEK    Is your provider already aware of this issue? NO    Have you been treated for this issue? NO

## 2024-10-30 NOTE — PROGRESS NOTES
"Chief Complaint  Addie Yates presents to [facility] for Abscess (under the RT arm X 1 week /Blood and pus draining from abscess /Warm compress & neosporin for Sx )    Subjective     History of Present Illness  Trudy is in today for concerns over an abscess under her right arm which has been present for about a week.  She does get some blood and pus draining from her abscess and has been applying warm compresses Neosporin for symptoms. No known history of MRSA.  No fever     Assessment and Plan     Diagnoses and all orders for this visit:    1. Axillary abscess (Primary)  -     doxycycline (MONODOX) 100 MG capsule; Take 1 capsule by mouth 2 (Two) Times a Day for 10 days.  Dispense: 20 capsule; Refill: 0            Follow Up   Return in about 2 days (around 11/1/2024) for Recheck and have I and D if needed .  Future Appointments   Date Time Provider Department Center   11/1/2024 11:15 AM Taylor Pelaez APRN [facility]   11/7/2024  5:00 PM [facility]   12/10/2024 10:30 AM Jammie Phillips MD [facility]   2/27/2025  2:30 PM [facility]dwell [facility]       New Medications Ordered This Visit   Medications    doxycycline (MONODOX) 100 MG capsule     Sig: Take 1 capsule by mouth 2 (Two) Times a Day for 10 days.     Dispense:  20 capsule     Refill:  0       Medications Discontinued During This Encounter   Medication Reason    Cholecalciferol 10 MCG (400 UNIT) capsule Duplicate order    methylcellulose oral powder Historical Med - Therapy completed          Review of Systems    Objective     Vitals:    10/30/24 1100   BP: 137/92   BP Location: Left arm   Patient Position: Sitting   Cuff Size: Adult   Pulse: 68   Temp: 97.9 °F (36.6 °C)   TempSrc: Oral   SpO2: 95%   Weight: 65.8 kg (145 lb)   Height: 162.6 cm (64.02\")     BMI is within normal parameters. No other follow-up for BMI required.      Physical " Exam  Constitutional:       Appearance: Normal appearance.   HENT:      Head: Normocephalic.   Musculoskeletal:         General: Normal range of motion.      Cervical back: Normal range of motion.   Skin:     Findings: Abscess (right axillary area- active drainage, induration with no extensive erythema noted) present.   Neurological:      General: No focal deficit present.      Mental Status: She is alert and oriented to person, place, and time.   Psychiatric:         Mood and Affect: Mood normal.         Thought Content: Thought content normal.               Result Review        No Known Allergies   Past Medical History:   Diagnosis Date    Anxiety disorder, unspecified     Atherosclerotic heart disease of native coronary artery without angina pectoris     Carpal tunnel syndrome on right     Chronic pain     BASE OF SKULL/UPPERMOST NECK    Constipation, unspecified     DDD (degenerative disc disease), cervical     DDD (degenerative disc disease), lumbar     Diaphragmatic hernia without obstruction and without gangrene     Dyskinesia of esophagus     Gastroparesis     GERD (gastroesophageal reflux disease)     Glaucoma     Hiatal hernia     Hypothyroidism, unspecified     Long term (current) use of opiate analgesic     Medial epicondylitis     MI (myocardial infarction)     2015    Osteoarthritis     Osteoporosis without current pathological fracture     Other idiopathic scoliosis, site unspecified     Presence of intraocular lens     Primary generalized (osteo)arthritis     Right lateral epicondylitis     Toxic goiter     Unspecified hemorrhoids      Current Outpatient Medications   Medication Sig Dispense Refill    aspirin 81 MG EC tablet Take 1 tablet by mouth Every Night.      atorvastatin (LIPITOR) 80 MG tablet Take 1 tablet by mouth Daily.      busPIRone (BUSPAR) 5 MG tablet Take 1 tablet by mouth 2 (Two) Times a Day. 180 tablet 1    Calcium Citrate-Vitamin D3 (CITRACAL) 315-6.25 MG-MCG tablet tablet Take  by  mouth Daily.      docusate sodium (COLACE) 100 MG capsule Take 1 capsule by mouth 2 (Two) Times a Day.      DULoxetine (CYMBALTA) 60 MG capsule Take 1 capsule by mouth Daily. 90 capsule 1    famotidine (PEPCID) 40 MG tablet Take 1 tablet by mouth Every Night. 30 tablet 5    ketoconazole (NIZORAL) 2 % shampoo SHAMPOO SCALP 2-3 TIMES WEEKLY AS NEEDED. ALTERNATE WITH OTHER SHAMPOO      lactulose (CHRONULAC) 10 GM/15ML solution Take 30 mL by mouth 2 (Two) Times a Day. 946 mL 3    levothyroxine (SYNTHROID, LEVOTHROID) 100 MCG tablet Take 1 tablet by mouth Daily. 90 tablet 1    lisinopril (PRINIVIL,ZESTRIL) 5 MG tablet Take 1 tablet by mouth Every Night.      Magnesium Citrate 100 MG capsule Take 1 tablet by mouth Every Other Day.      meloxicam (MOBIC) 15 MG tablet Take 1 tablet by mouth Daily As Needed for Moderate Pain. 30 tablet 4    nitroglycerin (Nitrostat) 0.4 MG SL tablet Place 1 tablet under the tongue Every 5 (Five) Minutes As Needed for Chest Pain. Take no more than 3 doses in 15 minutes. 20 tablet 0    omeprazole (priLOSEC) 40 MG capsule Take 1 capsule by mouth Daily. 90 capsule 1    Plecanatide (Trulance) 3 MG tablet Take 1 tablet by mouth Daily. 90 tablet 1    Prolia 60 MG/ML solution prefilled syringe syringe INJECT 60MG SUBCUTANEOUSLY EVERY 6 MONTHS 1 mL 0    saccharomyces boulardii (FLORASTOR) 250 MG capsule Take 1 capsule by mouth Daily.      simethicone (MYLICON,GAS-X) 125 MG capsule capsule Take 1 capsule by mouth As Needed.      traMADol (ULTRAM) 50 MG tablet Take 1 tablet by mouth 2 (Two) Times a Day As Needed for Moderate Pain. for pain 60 tablet 0    doxycycline (MONODOX) 100 MG capsule Take 1 capsule by mouth 2 (Two) Times a Day for 10 days. 20 capsule 0     Current Facility-Administered Medications   Medication Dose Route Frequency Provider Last Rate Last Admin    denosumab (PROLIA) syringe 60 mg  60 mg Subcutaneous Q6 Months Jammie Phillips MD   60 mg at 08/16/23 0927     Past Surgical  History:   Procedure Laterality Date    CARPAL TUNNEL RELEASE Left     COLONOSCOPY      2007, 2010, 2015 - WITH ADENOMA    COLONOSCOPY N/A 9/11/2023    Procedure: COLONOSCOPY WITH POLYPECTOMY AND BIOPSIES;  Surgeon: Yodit Leryo MD;  Location: [facility] ENDOSCOPY;  Service: Gastroenterology;  Laterality: N/A;  COLON POLYP    CORONARY ANGIOPLASTY WITH STENT PLACEMENT  11/2015    ENDOSCOPY      2008, 2016    ENDOSCOPY N/A 11/04/2022    Procedure: ESOPHAGOGASTRODUODENOSCOPY with biopsy;  Surgeon: Yodit Leroy MD;  Location: [facility] ENDOSCOPY;  Service: Gastroenterology;  Laterality: N/A;  gastritis, hiatal hernia    FOOT SURGERY Right 05/15/2023    HYSTERECTOMY  2004    LAPAROSCOPIC CHOLECYSTECTOMY  2017    REFRACTIVE SURGERY Bilateral     THYROID BIOPSY Right     TUBAL ABDOMINAL LIGATION      BI-TUBAL    UPPER GASTROINTESTINAL ENDOSCOPY        Health Maintenance Due   Topic Date Due    ZOSTER VACCINE (1 of 2) Never done    ANNUAL WELLNESS VISIT  12/07/2024    COLORECTAL CANCER SCREENING  03/01/2025      Immunization History   Administered Date(s) Administered    COVID-19 (MODERNA) 1st,2nd,3rd Dose Monovalent 03/04/2021, 04/01/2021, 12/01/2021    COVID-19 (MODERNA) BIVALENT 12+YRS 11/10/2022    COVID-19 (PFIZER) 12YRS+ (COMIRNATY) 12/07/2023, 09/27/2024    Fluzone (or Fluarix & Flulaval for VFC) >6mos 11/03/2021, 11/08/2022, 12/07/2023    Fluzone High-Dose 65+YRS 09/27/2024    Influenza, Unspecified 10/15/2020    Pneumococcal Conjugate 20-Valent (PCV20) 06/19/2024    Td (TDVAX) 09/09/1999    Tdap 05/26/2016, 08/16/2023         Part of this note may be an electronic transcription/translation of spoken language to printed   text using the Dragon Dictation System.      KEVON Colon

## 2024-11-01 ENCOUNTER — PROCEDURE VISIT (OUTPATIENT)
Dept: FAMILY MEDICINE CLINIC | Age: 64
End: 2024-11-01
Payer: MEDICARE

## 2024-11-01 VITALS
WEIGHT: 147.6 LBS | SYSTOLIC BLOOD PRESSURE: 124 MMHG | TEMPERATURE: 97.5 F | BODY MASS INDEX: 25.2 KG/M2 | DIASTOLIC BLOOD PRESSURE: 86 MMHG | HEART RATE: 70 BPM | HEIGHT: 64 IN | OXYGEN SATURATION: 95 %

## 2024-11-01 DIAGNOSIS — L02.419 AXILLARY ABSCESS: Primary | ICD-10-CM

## 2024-11-01 PROCEDURE — 10060 I&D ABSCESS SIMPLE/SINGLE: CPT | Performed by: NURSE PRACTITIONER

## 2024-11-01 PROCEDURE — 87147 CULTURE TYPE IMMUNOLOGIC: CPT | Performed by: NURSE PRACTITIONER

## 2024-11-01 PROCEDURE — 87070 CULTURE OTHR SPECIMN AEROBIC: CPT | Performed by: NURSE PRACTITIONER

## 2024-11-01 PROCEDURE — 87205 SMEAR GRAM STAIN: CPT | Performed by: NURSE PRACTITIONER

## 2024-11-01 PROCEDURE — 87186 SC STD MICRODIL/AGAR DIL: CPT | Performed by: NURSE PRACTITIONER

## 2024-11-01 NOTE — PROGRESS NOTES
Chief Complaint  Procedure (I&D boil (R) armpit )    Subjective        Addie Yates presents to [facility] today for abscess right arm pit x 1 week, taking doxy bid for last couple days and area has started draining, and soaking it with Epsom salt and it has improved some of the last 2 days.  Denies fever or other symptoms.      Current Outpatient Medications:     aspirin 81 MG EC tablet, Take 1 tablet by mouth Every Night., Disp: , Rfl:     atorvastatin (LIPITOR) 80 MG tablet, Take 1 tablet by mouth Daily., Disp: , Rfl:     busPIRone (BUSPAR) 5 MG tablet, Take 1 tablet by mouth 2 (Two) Times a Day., Disp: 180 tablet, Rfl: 1    Calcium Citrate-Vitamin D3 (CITRACAL) 315-6.25 MG-MCG tablet tablet, Take  by mouth Daily., Disp: , Rfl:     denosumab (Prolia) 60 MG/ML solution prefilled syringe syringe, Inject 1 mL under the skin into the appropriate area as directed Every 6 (Six) Months. Indications: Postmenopausal Osteoporosis, Disp: 1 mL, Rfl: 0    docusate sodium (COLACE) 100 MG capsule, Take 1 capsule by mouth 2 (Two) Times a Day., Disp: , Rfl:     doxycycline (MONODOX) 100 MG capsule, Take 1 capsule by mouth 2 (Two) Times a Day for 10 days., Disp: 20 capsule, Rfl: 0    DULoxetine (CYMBALTA) 60 MG capsule, Take 1 capsule by mouth Daily., Disp: 90 capsule, Rfl: 1    famotidine (PEPCID) 40 MG tablet, Take 1 tablet by mouth Every Night., Disp: 30 tablet, Rfl: 5    ketoconazole (NIZORAL) 2 % shampoo, SHAMPOO SCALP 2-3 TIMES WEEKLY AS NEEDED. ALTERNATE WITH OTHER SHAMPOO, Disp: , Rfl:     levothyroxine (SYNTHROID, LEVOTHROID) 100 MCG tablet, Take 1 tablet by mouth Daily., Disp: 90 tablet, Rfl: 1    lisinopril (PRINIVIL,ZESTRIL) 5 MG tablet, Take 1 tablet by mouth Every Night., Disp: , Rfl:     Magnesium Citrate 100 MG capsule, Take 1 tablet by mouth Every Other Day., Disp: , Rfl:     meloxicam (MOBIC) 15 MG tablet, Take 1 tablet by mouth Daily As Needed for Moderate Pain., Disp: 30  "tablet, Rfl: 4    nitroglycerin (Nitrostat) 0.4 MG SL tablet, Place 1 tablet under the tongue Every 5 (Five) Minutes As Needed for Chest Pain. Take no more than 3 doses in 15 minutes., Disp: 20 tablet, Rfl: 0    omeprazole (priLOSEC) 40 MG capsule, Take 1 capsule by mouth Daily., Disp: 90 capsule, Rfl: 1    Plecanatide (Trulance) 3 MG tablet, Take 1 tablet by mouth Daily., Disp: 90 tablet, Rfl: 1    saccharomyces boulardii (FLORASTOR) 250 MG capsule, Take 1 capsule by mouth Daily., Disp: , Rfl:     simethicone (MYLICON,GAS-X) 125 MG capsule capsule, Take 1 capsule by mouth As Needed., Disp: , Rfl:     traMADol (ULTRAM) 50 MG tablet, Take 1 tablet by mouth 2 (Two) Times a Day As Needed for Moderate Pain. for pain, Disp: 60 tablet, Rfl: 0    lactulose (CHRONULAC) 10 GM/15ML solution, Take 30 mL by mouth 2 (Two) Times a Day. (Patient not taking: Reported on 11/1/2024), Disp: 946 mL, Rfl: 3  There are no discontinued medications.      Allergies:  Patient has no known allergies.      Objective   Vital Signs:   Vitals:    11/01/24 1113   BP: 124/86   BP Location: Left arm   Patient Position: Sitting   Cuff Size: Adult   Pulse: 70   Temp: 97.5 °F (36.4 °C)   TempSrc: Oral   SpO2: 95%   Weight: 67 kg (147 lb 9.6 oz)   Height: 162.6 cm (64.02\")     Body mass index is 25.32 kg/m².           Physical Exam  Constitutional:       Appearance: Normal appearance.   Neck:      Vascular: No carotid bruit.   Cardiovascular:      Rate and Rhythm: Normal rate and regular rhythm.      Heart sounds: Normal heart sounds.   Pulmonary:      Effort: Pulmonary effort is normal.      Breath sounds: Normal breath sounds.   Musculoskeletal:         General: Normal range of motion.   Skin:     General: Skin is warm and dry.      Comments: Abscess right axilla, I&D performed   Neurological:      General: No focal deficit present.      Mental Status: She is alert.   Psychiatric:         Mood and Affect: Mood normal.         Behavior: Behavior " normal.             Lab Results   Component Value Date    GLUCOSE 93 08/27/2024    BUN 18 08/27/2024    CREATININE 0.91 08/27/2024    EGFRIFNONA 78 12/29/2021    EGFRIFAFRI 76 02/28/2018    BCR 19.8 08/27/2024    K 4.2 08/27/2024    CO2 27.8 08/27/2024    CALCIUM 10.2 08/27/2024    ALBUMIN 4.4 08/27/2024    AST 24 08/27/2024    ALT 19 08/27/2024       Lab Results   Component Value Date    CHOL 152 06/19/2024    TRIG 50 06/19/2024    HDL 70 (H) 06/19/2024    LDL 71 06/19/2024       Lab Results   Component Value Date    WBC 5.68 06/19/2024    HGB 13.2 06/19/2024    HCT 42.3 06/19/2024    MCV 96.1 06/19/2024     06/19/2024           Incision & Drainage    Date/Time: 11/1/2024 12:39 PM    Performed by: Talyor Pelaez APRN  Authorized by: Taylor Pelaez APRN  Type: abscess  Body area: upper extremity (right axilla)  Anesthesia: local infiltration    Anesthesia:  Local Anesthetic: lidocaine 1% with epinephrine  Anesthetic total: 3 mL    Sedation:  Patient sedated: no    Scalpel size: 11  Incision type: single straight  Drainage: purulent  Drainage amount: moderate  Wound treatment: wound left open  Packing material: 1/2 in gauze  Patient tolerance: patient tolerated the procedure well with no immediate complications  Comments: Culture taken, wound left open to drainage, covered with band aid                Diagnoses and all orders for this visit:    1. Axillary abscess (Primary)  Comments:  Wound packed, follow-up Monday.,  Continue antibiotics as currently taking  Orders:  -     Incision & Drainage  -     Wound Culture - Wound, Axilla, Right            Follow Up  Return in about 2 days (around 11/3/2024) for Recheck.  Patient was given instructions and counseling regarding her condition or for health maintenance advice. Please see specific information pulled into the AVS if appropriate.           KEVON Baig  11/01/2024    Please note that portions of this document were completed  using a voice recognition program.

## 2024-11-04 ENCOUNTER — PROCEDURE VISIT (OUTPATIENT)
Dept: FAMILY MEDICINE CLINIC | Age: 64
End: 2024-11-04
Payer: MEDICARE

## 2024-11-04 ENCOUNTER — TELEPHONE (OUTPATIENT)
Dept: FAMILY MEDICINE CLINIC | Age: 64
End: 2024-11-04

## 2024-11-04 VITALS
HEART RATE: 78 BPM | WEIGHT: 146.2 LBS | BODY MASS INDEX: 24.96 KG/M2 | DIASTOLIC BLOOD PRESSURE: 90 MMHG | OXYGEN SATURATION: 94 % | HEIGHT: 64 IN | SYSTOLIC BLOOD PRESSURE: 135 MMHG | TEMPERATURE: 98.3 F

## 2024-11-04 DIAGNOSIS — L02.419 AXILLARY ABSCESS: Primary | ICD-10-CM

## 2024-11-04 LAB
BACTERIA SPEC AEROBE CULT: ABNORMAL
GRAM STN SPEC: ABNORMAL

## 2024-11-04 PROCEDURE — 99213 OFFICE O/P EST LOW 20 MIN: CPT | Performed by: NURSE PRACTITIONER

## 2024-11-04 NOTE — PROGRESS NOTES
Chief Complaint  Wound Check    Subjective        Addie Yates presents to [facility] today for follow up on axilla cyst, was seen last Friday, 3 days ago and I and D done , culture taken , taking doxy for wound infection,which came back + for staph infection, which is covered under doxy. Since last Friday area has only had small amount drainage,no longer painful, seems to be healing some.       Current Outpatient Medications:     aspirin 81 MG EC tablet, Take 1 tablet by mouth Every Night., Disp: , Rfl:     atorvastatin (LIPITOR) 80 MG tablet, Take 1 tablet by mouth Daily., Disp: , Rfl:     busPIRone (BUSPAR) 5 MG tablet, Take 1 tablet by mouth 2 (Two) Times a Day., Disp: 180 tablet, Rfl: 1    Calcium Citrate-Vitamin D3 (CITRACAL) 315-6.25 MG-MCG tablet tablet, Take  by mouth Daily., Disp: , Rfl:     denosumab (Prolia) 60 MG/ML solution prefilled syringe syringe, Inject 1 mL under the skin into the appropriate area as directed Every 6 (Six) Months. Indications: Postmenopausal Osteoporosis, Disp: 1 mL, Rfl: 0    docusate sodium (COLACE) 100 MG capsule, Take 1 capsule by mouth 2 (Two) Times a Day., Disp: , Rfl:     doxycycline (MONODOX) 100 MG capsule, Take 1 capsule by mouth 2 (Two) Times a Day for 10 days., Disp: 20 capsule, Rfl: 0    DULoxetine (CYMBALTA) 60 MG capsule, Take 1 capsule by mouth Daily., Disp: 90 capsule, Rfl: 1    famotidine (PEPCID) 40 MG tablet, Take 1 tablet by mouth Every Night., Disp: 30 tablet, Rfl: 5    ketoconazole (NIZORAL) 2 % shampoo, SHAMPOO SCALP 2-3 TIMES WEEKLY AS NEEDED. ALTERNATE WITH OTHER SHAMPOO, Disp: , Rfl:     lactulose (CHRONULAC) 10 GM/15ML solution, Take 30 mL by mouth 2 (Two) Times a Day., Disp: 946 mL, Rfl: 3    levothyroxine (SYNTHROID, LEVOTHROID) 100 MCG tablet, Take 1 tablet by mouth Daily., Disp: 90 tablet, Rfl: 1    lisinopril (PRINIVIL,ZESTRIL) 5 MG tablet, Take 1 tablet by mouth Every Night., Disp: , Rfl:     Magnesium Citrate 100  "MG capsule, Take 1 tablet by mouth Every Other Day., Disp: , Rfl:     meloxicam (MOBIC) 15 MG tablet, Take 1 tablet by mouth Daily As Needed for Moderate Pain., Disp: 30 tablet, Rfl: 4    nitroglycerin (Nitrostat) 0.4 MG SL tablet, Place 1 tablet under the tongue Every 5 (Five) Minutes As Needed for Chest Pain. Take no more than 3 doses in 15 minutes., Disp: 20 tablet, Rfl: 0    omeprazole (priLOSEC) 40 MG capsule, Take 1 capsule by mouth Daily., Disp: 90 capsule, Rfl: 1    Plecanatide (Trulance) 3 MG tablet, Take 1 tablet by mouth Daily., Disp: 90 tablet, Rfl: 1    saccharomyces boulardii (FLORASTOR) 250 MG capsule, Take 1 capsule by mouth Daily., Disp: , Rfl:     simethicone (MYLICON,GAS-X) 125 MG capsule capsule, Take 1 capsule by mouth As Needed., Disp: , Rfl:     traMADol (ULTRAM) 50 MG tablet, Take 1 tablet by mouth 2 (Two) Times a Day As Needed for Moderate Pain. for pain, Disp: 60 tablet, Rfl: 0  There are no discontinued medications.      Allergies:  Patient has no known allergies.      Objective   Vital Signs:   Vitals:    11/04/24 1432   BP: 135/90   BP Location: Left arm   Patient Position: Sitting   Cuff Size: Adult   Pulse: 78   Temp: 98.3 °F (36.8 °C)   TempSrc: Oral   SpO2: 94%   Weight: 66.3 kg (146 lb 3.2 oz)   Height: 162.6 cm (64.02\")     Body mass index is 25.08 kg/m².           Physical Exam  Constitutional:       Appearance: Normal appearance.   Cardiovascular:      Rate and Rhythm: Normal rate and regular rhythm.      Heart sounds: Normal heart sounds.   Pulmonary:      Effort: Pulmonary effort is normal.      Breath sounds: Normal breath sounds.   Musculoskeletal:         General: Normal range of motion.   Lymphadenopathy:      Cervical: No cervical adenopathy.   Skin:     General: Skin is warm and dry.      Comments: Right arm axilla wound, dressing changed , no exudate , packing removed, wound cleansed , neosporin applied, clean bandage applied.    Neurological:      General: No focal " deficit present.      Mental Status: She is alert.   Psychiatric:         Mood and Affect: Mood normal.         Behavior: Behavior normal.             Lab Results   Component Value Date    GLUCOSE 93 08/27/2024    BUN 18 08/27/2024    CREATININE 0.91 08/27/2024    EGFRIFNONA 78 12/29/2021    EGFRIFAFRI 76 02/28/2018    BCR 19.8 08/27/2024    K 4.2 08/27/2024    CO2 27.8 08/27/2024    CALCIUM 10.2 08/27/2024    ALBUMIN 4.4 08/27/2024    AST 24 08/27/2024    ALT 19 08/27/2024       Lab Results   Component Value Date    CHOL 152 06/19/2024    TRIG 50 06/19/2024    HDL 70 (H) 06/19/2024    LDL 71 06/19/2024       Lab Results   Component Value Date    WBC 5.68 06/19/2024    HGB 13.2 06/19/2024    HCT 42.3 06/19/2024    MCV 96.1 06/19/2024     06/19/2024           Procedures         Diagnoses and all orders for this visit:    1. Axillary abscess (Primary)  Comments:  Continue doxy , change dressing daily until healed        Will have staff reach out to patient in 1 week  to see how wound is healing     Follow Up  Return if symptoms worsen or fail to improve, for If not improving or symptoms are getting worse.  Patient was given instructions and counseling regarding her condition or for health maintenance advice. Please see specific information pulled into the AVS if appropriate.           Mazin Pelaez, APRN  11/04/2024    Please note that portions of this document were completed using a voice recognition program.

## 2024-11-07 ENCOUNTER — HOSPITAL ENCOUNTER (OUTPATIENT)
Dept: MRI IMAGING | Facility: HOSPITAL | Age: 64
Discharge: HOME | End: 2024-11-07
Admitting: FAMILY MEDICINE
Payer: MEDICARE

## 2024-11-07 DIAGNOSIS — S22.000D COMPRESSION FRACTURE OF THORACIC VERTEBRA WITH ROUTINE HEALING, UNSPECIFIED THORACIC VERTEBRAL LEVEL, SUBSEQUENT ENCOUNTER: ICD-10-CM

## 2024-11-07 PROCEDURE — 72146 MRI CHEST SPINE W/O DYE: CPT

## 2024-11-22 ENCOUNTER — CLINICAL SUPPORT (OUTPATIENT)
Dept: FAMILY MEDICINE CLINIC | Age: 64
End: 2024-11-22
Payer: MEDICARE

## 2024-11-22 DIAGNOSIS — M81.0 AGE-RELATED OSTEOPOROSIS WITHOUT CURRENT PATHOLOGICAL FRACTURE: Primary | ICD-10-CM

## 2024-12-04 DIAGNOSIS — M15.9 GENERALIZED OSTEOARTHRITIS: ICD-10-CM

## 2024-12-04 RX ORDER — TRAMADOL HYDROCHLORIDE 50 MG/1
50 TABLET ORAL 2 TIMES DAILY PRN
Qty: 60 TABLET | Refills: 0 | Status: SHIPPED | OUTPATIENT
Start: 2024-12-04

## 2024-12-10 ENCOUNTER — OFFICE VISIT (OUTPATIENT)
Dept: FAMILY MEDICINE CLINIC | Age: 64
End: 2024-12-10
Payer: MEDICARE

## 2024-12-10 ENCOUNTER — LAB (OUTPATIENT)
Dept: LAB | Facility: HOSPITAL | Age: 64
End: 2024-12-10
Payer: MEDICARE

## 2024-12-10 VITALS
HEIGHT: 64 IN | OXYGEN SATURATION: 98 % | HEART RATE: 72 BPM | WEIGHT: 147 LBS | SYSTOLIC BLOOD PRESSURE: 147 MMHG | BODY MASS INDEX: 25.1 KG/M2 | DIASTOLIC BLOOD PRESSURE: 95 MMHG | TEMPERATURE: 97.8 F

## 2024-12-10 DIAGNOSIS — M54.41 CHRONIC BILATERAL LOW BACK PAIN WITH BILATERAL SCIATICA: ICD-10-CM

## 2024-12-10 DIAGNOSIS — E03.9 ACQUIRED HYPOTHYROIDISM: ICD-10-CM

## 2024-12-10 DIAGNOSIS — E78.2 MIXED HYPERLIPIDEMIA: ICD-10-CM

## 2024-12-10 DIAGNOSIS — M48.56XD NON-TRAUMATIC COMPRESSION FRACTURE OF L1 LUMBAR VERTEBRA WITH ROUTINE HEALING, SUBSEQUENT ENCOUNTER: ICD-10-CM

## 2024-12-10 DIAGNOSIS — I10 ESSENTIAL HYPERTENSION: ICD-10-CM

## 2024-12-10 DIAGNOSIS — Z00.00 ANNUAL PHYSICAL EXAM: Primary | ICD-10-CM

## 2024-12-10 DIAGNOSIS — Z12.31 SCREENING MAMMOGRAM FOR BREAST CANCER: ICD-10-CM

## 2024-12-10 DIAGNOSIS — M81.0 AGE-RELATED OSTEOPOROSIS WITHOUT CURRENT PATHOLOGICAL FRACTURE: ICD-10-CM

## 2024-12-10 DIAGNOSIS — F41.1 GENERALIZED ANXIETY DISORDER: ICD-10-CM

## 2024-12-10 DIAGNOSIS — K21.9 GASTROESOPHAGEAL REFLUX DISEASE WITHOUT ESOPHAGITIS: ICD-10-CM

## 2024-12-10 DIAGNOSIS — G89.29 CHRONIC BILATERAL LOW BACK PAIN WITH BILATERAL SCIATICA: ICD-10-CM

## 2024-12-10 DIAGNOSIS — M54.42 CHRONIC BILATERAL LOW BACK PAIN WITH BILATERAL SCIATICA: ICD-10-CM

## 2024-12-10 DIAGNOSIS — Z79.899 HIGH RISK MEDICATION USE: ICD-10-CM

## 2024-12-10 DIAGNOSIS — K44.9 HIATAL HERNIA: ICD-10-CM

## 2024-12-10 DIAGNOSIS — I25.10 CORONARY ARTERY DISEASE INVOLVING NATIVE CORONARY ARTERY OF NATIVE HEART WITHOUT ANGINA PECTORIS: ICD-10-CM

## 2024-12-10 LAB
AMPHET+METHAMPHET UR QL: NEGATIVE
AMPHETAMINES UR QL: NEGATIVE
BARBITURATES UR QL SCN: NEGATIVE
BENZODIAZ UR QL SCN: NEGATIVE
BUPRENORPHINE SERPL-MCNC: NEGATIVE NG/ML
CANNABINOIDS SERPL QL: NEGATIVE
COCAINE UR QL: NEGATIVE
EXPIRATION DATE: NORMAL
Lab: NORMAL
MDMA UR QL SCN: NEGATIVE
METHADONE UR QL SCN: NEGATIVE
MORPHINE/OPIATES SCREEN, URINE: NEGATIVE
OXYCODONE UR QL SCN: NEGATIVE
PCP UR QL SCN: NEGATIVE
TSH SERPL DL<=0.05 MIU/L-ACNC: 3.81 UIU/ML (ref 0.27–4.2)

## 2024-12-10 PROCEDURE — 1170F FXNL STATUS ASSESSED: CPT | Performed by: FAMILY MEDICINE

## 2024-12-10 PROCEDURE — 99214 OFFICE O/P EST MOD 30 MIN: CPT | Performed by: FAMILY MEDICINE

## 2024-12-10 PROCEDURE — 3075F SYST BP GE 130 - 139MM HG: CPT | Performed by: FAMILY MEDICINE

## 2024-12-10 PROCEDURE — G0439 PPPS, SUBSEQ VISIT: HCPCS | Performed by: FAMILY MEDICINE

## 2024-12-10 PROCEDURE — 1159F MED LIST DOCD IN RCRD: CPT | Performed by: FAMILY MEDICINE

## 2024-12-10 PROCEDURE — 1160F RVW MEDS BY RX/DR IN RCRD: CPT | Performed by: FAMILY MEDICINE

## 2024-12-10 PROCEDURE — 3080F DIAST BP >= 90 MM HG: CPT | Performed by: FAMILY MEDICINE

## 2024-12-10 PROCEDURE — 84443 ASSAY THYROID STIM HORMONE: CPT

## 2024-12-10 PROCEDURE — 1125F AMNT PAIN NOTED PAIN PRSNT: CPT | Performed by: FAMILY MEDICINE

## 2024-12-10 PROCEDURE — 36415 COLL VENOUS BLD VENIPUNCTURE: CPT

## 2024-12-10 RX ORDER — BUSPIRONE HYDROCHLORIDE 5 MG/1
5 TABLET ORAL 2 TIMES DAILY
Qty: 180 TABLET | Refills: 1 | Status: SHIPPED | OUTPATIENT
Start: 2024-12-10

## 2024-12-10 RX ORDER — OMEPRAZOLE 40 MG/1
40 CAPSULE, DELAYED RELEASE ORAL DAILY
Qty: 90 CAPSULE | Refills: 1 | Status: SHIPPED | OUTPATIENT
Start: 2024-12-10

## 2024-12-10 RX ORDER — DULOXETIN HYDROCHLORIDE 60 MG/1
60 CAPSULE, DELAYED RELEASE ORAL DAILY
Qty: 90 CAPSULE | Refills: 1 | Status: SHIPPED | OUTPATIENT
Start: 2024-12-10

## 2024-12-10 RX ORDER — FAMOTIDINE 40 MG/1
40 TABLET, FILM COATED ORAL NIGHTLY
Qty: 90 TABLET | Refills: 1 | Status: SHIPPED | OUTPATIENT
Start: 2024-12-10

## 2024-12-10 NOTE — ASSESSMENT & PLAN NOTE
Stable on thyroxine 100 mcg daily.  Refills were not needed today.  Labs were due today.  Continue to monitor.  Orders:    TSH; Future

## 2024-12-10 NOTE — ASSESSMENT & PLAN NOTE
Stable on omeprazole 40 mg daily and famotidine 40 mg daily.  Refills were needed today.  Continue to monitor.  Wean PPI as able.  Orders:    famotidine (PEPCID) 40 MG tablet; Take 1 tablet by mouth Every Night.    omeprazole (priLOSEC) 40 MG capsule; Take 1 capsule by mouth Daily.

## 2024-12-10 NOTE — ASSESSMENT & PLAN NOTE
Ongoing back pain.  Released from [facility].  Referral to Pain Management to see if she can get better symptomatic control.  Orders:    Ambulatory Referral to [facility] Management Clinic

## 2024-12-10 NOTE — ASSESSMENT & PLAN NOTE
OUTPATIENT PROGRESS NOTE    CHIEF COMPLAINT  Chest Pain, Mass (lump on neck), and Dizziness      SUBJECTIVE  HPI     Rancho comes in to discuss dizziness and chest pain    Dizziness, pressure like. Feels like head goes into a press.   Had this before the hearing loss. tinnitis is bad  Feels like he is on a boat constantly. But has flare ups with pressure. Vision whites out with the pressure sensation, like someone is trying to suck his brain out. Pulling or pushing causes significant head pressure. MRI of head negative that was done for sudden onset hearing loss. Has to move slowly.  I think we should have see a neurologist, this does not sound like a typical vertigo.  We could try Zofran and meclizine to help him tolerate the symptoms in the meanwhile    BP has fluctuated quite a bit, but the last 2 weeks it has been normal and it is normal today.    Some chest pain of the left side of the chest for 1 month.Chest pain with exertion and at rest.  His brother is 6 yrs older, just had blocked arteries and heart attack. Right now I don't know if he would tolerate a stress test - chronic severe back pain, anxiety and asthma.  We will try a pharmacologic stress ECHO.     Will appears to be a sebaceous cyst on the lateral side of the left neck.  Will refer him to Dermatology for removal of this.    He has had history of leukocytosis, will re-evaluate this with lab work.      MEDICATIONS  Medications were reviewed and updated today.    HISTORIES  I have personally reviewed and updated the following EMR sections: Current medications, Allergies, Problem list and Social History    REVIEW OF SYSTEMS  Review of Systems   Constitutional: Negative for chills, fever and weight loss.   Respiratory: Negative for hemoptysis and wheezing.    Cardiovascular: Negative for palpitations and leg swelling.        See HPI   Gastrointestinal: Negative for abdominal pain, blood in stool, constipation, diarrhea, melena and vomiting.    Orders:    omeprazole (priLOSEC) 40 MG capsule; Take 1 capsule by mouth Daily.       Genitourinary: Negative for dysuria, frequency and urgency.   Musculoskeletal:        Chronic back pain   Skin: Negative for itching and rash.   Neurological: Positive for dizziness. Negative for speech change, focal weakness and loss of consciousness.        See HPI   Psychiatric/Behavioral:        Anxiety improved with the medication       PHYSICAL EXAM  Visit Vitals  /74   Pulse 87   Ht 5' 10\" (1.778 m)   Wt 93.6 kg   SpO2 96%   BMI 29.60 kg/m²      Physical Exam   Constitutional: He is oriented to person, place, and time and well-developed, well-nourished, and in no distress.   Cardiovascular: Normal rate, regular rhythm and normal heart sounds.   No murmur heard.  Pulmonary/Chest: Effort normal and breath sounds normal. No respiratory distress. He has no wheezes. He has no rales.   Musculoskeletal:         General: No edema.   Neurological: He is alert and oriented to person, place, and time.   Psychiatric: Affect and judgment normal.           ASSESSMENT/PLAN  Dizziness  Pressure in head   Unclear etiology. Hx of negative MRI of head. Refer to neuro.   - SERVICE TO NEUROLOGY  - COMPREHENSIVE METABOLIC PANEL; Future  - THYROID STIMULATING HORMONE REFLEX; Future  - meclizine (ANTIVERT) 12.5 MG tablet; Take 1 tablet by mouth 3 times daily as needed for Dizziness.  Dispense: 30 tablet; Refill: 1    Nausea  Related to the dizziness  - ondansetron (ZOFRAN ODT) 4 MG disintegrating tablet; Place 1 tablet onto the tongue every 8 hours as needed for Nausea.  Dispense: 20 tablet; Refill: 3    Sudden-onset sensorineural hearing loss, left, idiopathic  Will see if neuro has anything else to add for this.   - SERVICE TO NEUROLOGY    Other chest pain  fmhx of early MI. Will eval with stress test.   - ECHO STRESS; Future  - STRESS TEST; Future  - 2019 NOVEL CORONAVIRUS (SARS-COV-2); Future    Essential (primary) hypertension   Controlled, continue to monitor closely  - THYROID STIMULATING HORMONE REFLEX;  Future    Mixed hyperlipidemia  - LIPID PANEL WITH REFLEX; Future    Leukocytosis, unspecified type  Re-evaluate  - PATHOLOGY SMEAR REVIEW; Future  - CBC WITH DIFFERENTIAL; Future  - THYROID STIMULATING HORMONE REFLEX; Future    Sebaceous cyst  - SERVICE TO DERMATOLOGY      Health Maintenance Summary     Pneumococcal Vaccine 0-64 (1 of 1 - PPSV23)  Overdue since 11/26/1979    DTaP/Tdap/Td Vaccine (1 - Tdap)  Overdue since 11/26/1992    Medicare Wellness Visit (Yearly)  Overdue since 10/1/2018    Influenza Vaccine (1)  Postponed until 6/30/2021    Hepatitis B Vaccine   Aged Out    Meningococcal Vaccine   Aged Out    HPV Vaccine   Aged Out          Return in about 3 months (around 3/10/2021), or if symptoms worsen or fail to improve.        Emily Henley, DO   Internal Medicine      Greater than 50% of the visit was spent counseling regarding above issues; total time spent 40 minutes.

## 2024-12-10 NOTE — ASSESSMENT & PLAN NOTE
Stable on current regimen.  Symptoms are well controlled.  No adverse effects. She does require ongoing use of this controlled substance to function.  Tox screen was due today.  Prior tox screen appropriate.  HAYLEY was run today.  Refills were not needed today.  RTC 3 months.  Orders:    Ambulatory Referral to Pain Management Clinic

## 2024-12-10 NOTE — ASSESSMENT & PLAN NOTE
Stable on duloxetine 60 mg daily and buspirone 5 mg twice daily.  Refills were needed today.  Continue to monitor.  Orders:    busPIRone (BUSPAR) 5 MG tablet; Take 1 tablet by mouth 2 (Two) Times a Day.    DULoxetine (CYMBALTA) 60 MG capsule; Take 1 capsule by mouth Daily.

## 2024-12-10 NOTE — ASSESSMENT & PLAN NOTE
Follows with Wayne LUND, [facility].  Stable on aspirin, statin, ACE-I.        Female

## 2024-12-10 NOTE — PROGRESS NOTES
Subjective   The ABCs of the Annual Wellness Visit  Medicare Wellness Visit      Addie Yates is a 64 y.o. patient who presents for a Medicare Wellness Visit.    She is UTD on colonoscopy, last done 9/2023. Repeat anoscopy due March 2025 d/t h/o HSIL lesion.  She is following with Gastro now.  Pap smear is no longer indicated by history; s/p hysterectomy.  She is UTD on mammogram, last done 12/2023 and this was normal.  She is UTD on DEXA, last done 2/2023 and this showed osteopenia.  She is on Prolia.  She is UTD on COVID (9/2024), Prevnar 20 (6/2024), Tdap (8/2023), flu (9/2024).  She is due for Shingrix.  She is due for routine labs including TSH.     The following portions of the patient's history were reviewed and   updated as appropriate: allergies, current medications, past family history, past medical history, past social history, past surgical history, and problem list.    Compared to one year ago, the patient's physical   health is worse.  Compared to one year ago, the patient's mental   health is the same.    Recent Hospitalizations:  She was not admitted to the hospital during the last year.     Current Medical Providers:  Patient Care Team:  Jammie Phillips MD as PCP - General (Family Medicine)  Wayne Daniels APRN as Nurse Practitioner (Cardiology)  Gorge Reed DO (Orthopedic Surgery)  Ivy Simon MD (Pain Medicine)  Loren Caldwell APRN as Nurse Practitioner (Gastroenterology)  Yodit Leroy MD as Consulting Physician (Gastroenterology)  Manuel Lynch MD as Consulting Physician (Orthopedic Surgery)    Outpatient Medications Prior to Visit   Medication Sig Dispense Refill   • aspirin 81 MG EC tablet Take 1 tablet by mouth Every Night.     • atorvastatin (LIPITOR) 80 MG tablet Take 1 tablet by mouth Daily.     • Calcium Citrate-Vitamin D3 (CITRACAL) 315-6.25 MG-MCG tablet tablet Take  by mouth Daily.     • denosumab (Prolia) 60 MG/ML solution prefilled  syringe syringe Inject 1 mL under the skin into the appropriate area as directed Every 6 (Six) Months. Indications: Postmenopausal Osteoporosis 1 mL 0   • docusate sodium (COLACE) 100 MG capsule Take 1 capsule by mouth 2 (Two) Times a Day.     • ketoconazole (NIZORAL) 2 % shampoo SHAMPOO SCALP 2-3 TIMES WEEKLY AS NEEDED. ALTERNATE WITH OTHER SHAMPOO     • levothyroxine (SYNTHROID, LEVOTHROID) 100 MCG tablet Take 1 tablet by mouth Daily. 90 tablet 1   • lisinopril (PRINIVIL,ZESTRIL) 5 MG tablet Take 1 tablet by mouth Every Night.     • meloxicam (MOBIC) 15 MG tablet Take 1 tablet by mouth Daily As Needed for Moderate Pain. 30 tablet 4   • Plecanatide (Trulance) 3 MG tablet Take 1 tablet by mouth Daily. 90 tablet 1   • saccharomyces boulardii (FLORASTOR) 250 MG capsule Take 1 capsule by mouth Daily.     • simethicone (MYLICON,GAS-X) 125 MG capsule capsule Take 1 capsule by mouth As Needed.     • traMADol (ULTRAM) 50 MG tablet Take 1 tablet by mouth 2 (Two) Times a Day As Needed for Moderate Pain. for pain 60 tablet 0   • busPIRone (BUSPAR) 5 MG tablet Take 1 tablet by mouth 2 (Two) Times a Day. 180 tablet 1   • DULoxetine (CYMBALTA) 60 MG capsule Take 1 capsule by mouth Daily. 90 capsule 1   • famotidine (PEPCID) 40 MG tablet Take 1 tablet by mouth Every Night. 30 tablet 5   • omeprazole (priLOSEC) 40 MG capsule Take 1 capsule by mouth Daily. 90 capsule 1   • Magnesium Citrate 100 MG capsule Take 1 tablet by mouth Every Other Day.     • nitroglycerin (Nitrostat) 0.4 MG SL tablet Place 1 tablet under the tongue Every 5 (Five) Minutes As Needed for Chest Pain. Take no more than 3 doses in 15 minutes. 20 tablet 0   • lactulose (CHRONULAC) 10 GM/15ML solution Take 30 mL by mouth 2 (Two) Times a Day. 946 mL 3     Facility-Administered Medications Prior to Visit   Medication Dose Route Frequency Provider Last Rate Last Admin   • denosumab (PROLIA) syringe 60 mg  60 mg Subcutaneous Q6 Months Jammie Phillips MD   60  "mg at 11/22/24 1138     Opioid medication/s are on active medication list.  and I have evaluated her active treatment plan and pain score trends (see table).  Vitals:    12/10/24 1022   PainSc:   7   PainLoc: Back     I have reviewed the chart for potential of high risk medication and harmful drug interactions in the elderly.        Aspirin is on active medication list. Aspirin use is indicated based on review of current medical condition/s. Pros and cons of this therapy have been discussed today. Benefits of this medication outweigh potential harm.  Patient has been encouraged to continue taking this medication.  .      Patient Active Problem List   Diagnosis   • Coronary artery disease involving native coronary artery of native heart without angina pectoris   • Gastroesophageal reflux disease without esophagitis   • Glaucoma   • Essential hypertension   • Mixed hyperlipidemia   • Hiatal hernia   • Irritable bowel syndrome with constipation   • Acquired hypothyroidism   • Generalized osteoarthritis   • High risk medication use   • Esophageal spasm   • Generalized anxiety disorder   • Age-related osteoporosis without current pathological fracture   • Chronic bilateral low back pain with bilateral sciatica   • Compression fracture of lumbar spine, non-traumatic     Advance Care Planning Advance Directive is not on file.  ACP discussion was held with the patient during this visit. Patient does not have an advance directive, information provided.            Objective   Vitals:    12/10/24 1022   BP: (!) 133/101   Pulse: 72   Temp: 97.8 °F (36.6 °C)   TempSrc: Oral   SpO2: 98%   Weight: 66.7 kg (147 lb)   Height: 162.6 cm (64.02\")   PainSc:   7   PainLoc: Back       Estimated body mass index is 25.22 kg/m² as calculated from the following:    Height as of this encounter: 162.6 cm (64.02\").    Weight as of this encounter: 66.7 kg (147 lb).            Does the patient have evidence of cognitive impairment? No              "                                                                                   Health  Risk Assessment    Smoking Status:  Social History     Tobacco Use   Smoking Status Former   • Current packs/day: 0.00   • Average packs/day: 2.0 packs/day for 2.0 years (4.0 ttl pk-yrs)   • Types: Cigarettes   • Start date: 2002   • Quit date:    • Years since quittin.9   Smokeless Tobacco Never     Alcohol Consumption:  Social History     Substance and Sexual Activity   Alcohol Use Not Currently       Fall Risk Screen  STEADI Fall Risk Assessment was completed, and patient is at LOW risk for falls.Assessment completed on:12/10/2024    Depression Screening   Little interest or pleasure in doing things? Several days   Feeling down, depressed, or hopeless? Several days   PHQ-2 Total Score 2   Trouble falling or staying asleep, or sleeping too much? Almost all   Feeling tired or having little energy? Several days   Poor appetite or overeating? Not at all   Feeling bad about yourself - or that you are a failure or have let yourself or your family down? Not at all   Trouble concentrating on things, such as reading the newspaper or watching television? Not at all   Moving or speaking so slowly that other people could have noticed? Or the opposite - being so fidgety or restless that you have been moving around a lot more than usual? Over half   Thoughts that you would be better off dead, or of hurting yourself in some way? Not at all   PHQ-9 Total Score 8   If you checked off any problems, how difficult have these problems made it for you to do your work, take care of things at home, or get along with other people? Somewhat difficult      Health Habits and Functional and Cognitive Screenin/10/2024    10:25 AM   Functional & Cognitive Status   Do you have difficulty preparing food and eating? No   Do you have difficulty bathing yourself, getting dressed or grooming yourself? No   Do you have difficulty using the  toilet? No   Do you have difficulty moving around from place to place? No   Do you have trouble with steps or getting out of a bed or a chair? Yes   Current Diet Other        Current Diet Comment Heart Healthy   Dental Exam Up to date   Eye Exam Up to date   Exercise (times per week) 3 times per week   Current Exercises Include Walking   Do you need help using the phone?  No   Are you deaf or do you have serious difficulty hearing?  No   Do you need help to go to places out of walking distance? No   Do you need help shopping? No   Do you need help preparing meals?  No   Do you need help with housework?  No   Do you need help with laundry? No   Do you need help taking your medications? No   Do you need help managing money? No   Do you ever drive or ride in a car without wearing a seat belt? No   Have you felt unusual stress, anger or loneliness in the last month? Yes   Who do you live with? Other   If you need help, do you have trouble finding someone available to you? No   Have you been bothered in the last four weeks by sexual problems? No   Do you have difficulty concentrating, remembering or making decisions? No           Age-appropriate Screening Schedule:  Refer to the list below for future screening recommendations based on patient's age, sex and/or medical conditions. Orders for these recommended tests are listed in the plan section. The patient has been provided with a written plan.    Health Maintenance List  Health Maintenance   Topic Date Due   • ZOSTER VACCINE (1 of 2) Never done   • DXA SCAN  02/22/2025   • COLORECTAL CANCER SCREENING  03/01/2025   • LIPID PANEL  06/19/2025   • BMI FOLLOWUP  06/19/2025   • MAMMOGRAM  12/08/2025   • ANNUAL WELLNESS VISIT  12/10/2025   • TDAP/TD VACCINES (4 - Td or Tdap) 08/16/2033   • HEPATITIS C SCREENING  Completed   • COVID-19 Vaccine  Completed   • INFLUENZA VACCINE  Completed   • Pneumococcal Vaccine 65+  Completed                                                      "                                                                                           CMS Preventative Services Quick Reference  Risk Factors Identified During Encounter  Immunizations Discussed/Encouraged: Shingrix    The above risks/problems have been discussed with the patient.  Pertinent information has been shared with the patient in the After Visit Summary.  An After Visit Summary and PPPS were made available to the patient.    Follow Up:   Next Medicare Wellness visit to be scheduled in 1 year.         Additional E&M Note during same encounter follows:  Patient has additional, significant, and separately identifiable condition(s)/problem(s) that require work above and beyond the Medicare Wellness Visit     Chief Complaint  Medicare Wellness-subsequent    Subjective   HPI  Addie is also being seen today for additional medical problem/s.    She is on tramadol and duloxetine for cervicalgia and chronic low back pain secondary to diffuse osteoarthritis and scoliosis.  No adverse effects.  She has previously been on gabapentin (ineffective).  She has followed with Physical Therapy, a chiropractor, and [facility] for injections (ineffective).  She did suffer a lumbar compression fracture back in July.  Treated nonoperatively by [facility].  She did have a bone scan in September in which Radiology recommended considering a repeat MRI T-spine to update the imaging.     She is on lisinopril for hypertension.  Her BP has been well controlled.  Denies chest pain, palpitations or shortness of breath.  She is on atorvastatin for hyperlipidemia and CAD.  Denies myalgias.  She is on ASA for CAD.  S/p stenting 11/2015.  Follows with Cardiology Wayne LUND.        She is on duloxetine and buspirone for anxiety.  Mood has been \"fidgety.\"  Denies depressed mood or anhedonia, panic attacks.  Anxiety has been fairly well controlled.  Hard for her because she can't get around the way she wants to d/t " "the back.     She is on levothyroxine for hypothyroidism.  Denies heat/cold intolerance, changes in hair or skin.     She is on omeprazole for GERD and esophageal spasm.most recent EGD by Dr. Leroy showed hiatal hernia and gastritis.  Gastric emptying study has shown gastroparesis.  She is on Trulance and docusate for constipation.  She has previously been on Motegrity and lactulose.  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  She is following with Colorectal Surgery and Thuy Caldwell/Dr. Leroy [facility].     She is on Prolia for osteoporosis.  She is up-to-date on DEXA, last done 2/2023 and this showed osteopenia.     Review of Systems   Constitutional:  Positive for fatigue. Negative for chills and fever.   HENT:  Negative for congestion, hearing loss and rhinorrhea.    Eyes:  Negative for pain and visual disturbance.   Respiratory:  Negative for cough and shortness of breath.    Cardiovascular:  Negative for chest pain and palpitations.   Gastrointestinal:  Negative for abdominal pain, constipation, diarrhea, nausea and vomiting.   Genitourinary:  Negative for difficulty urinating and dysuria.   Musculoskeletal:  Positive for arthralgias and back pain. Negative for myalgias.   Neurological:  Negative for weakness and numbness.   Psychiatric/Behavioral:  Negative for dysphoric mood and sleep disturbance. The patient is not nervous/anxious.               Objective   Vital Signs:  BP (!) 133/101   Pulse 72   Temp 97.8 °F (36.6 °C) (Oral)   Ht 162.6 cm (64.02\")   Wt 66.7 kg (147 lb)   SpO2 98%   BMI 25.22 kg/m²   Physical Exam  Vitals reviewed.   Constitutional:       General: She is not in acute distress.     Appearance: Normal appearance. She is well-developed.   HENT:      Head: Normocephalic and atraumatic.      Right Ear: External ear normal.      Left Ear: External ear normal.      Mouth/Throat:      Mouth: Mucous membranes are moist.   Eyes:      Extraocular Movements: Extraocular " movements intact.      Conjunctiva/sclera: Conjunctivae normal.      Pupils: Pupils are equal, round, and reactive to light.   Cardiovascular:      Rate and Rhythm: Normal rate and regular rhythm.      Heart sounds: No murmur heard.  Pulmonary:      Effort: Pulmonary effort is normal.      Breath sounds: Normal breath sounds. No wheezing, rhonchi or rales.   Abdominal:      General: Bowel sounds are normal. There is no distension.      Palpations: Abdomen is soft.      Tenderness: There is no abdominal tenderness.   Musculoskeletal:         General: Normal range of motion.   Skin:     General: Skin is warm and dry.   Neurological:      Mental Status: She is alert and oriented to person, place, and time.      Deep Tendon Reflexes: Reflexes normal.   Psychiatric:         Mood and Affect: Mood and affect normal.         Behavior: Behavior normal.         Thought Content: Thought content normal.         Judgment: Judgment normal.     Lab Results   Component Value Date    GLUCOSE 93 08/27/2024    BUN 18 08/27/2024    CREATININE 0.91 08/27/2024    EGFR 70.2 08/27/2024    BCR 19.8 08/27/2024    K 4.2 08/27/2024    CO2 27.8 08/27/2024    CALCIUM 10.2 08/27/2024    ALBUMIN 4.4 08/27/2024    BILITOT 0.4 08/27/2024    AST 24 08/27/2024    ALT 19 08/27/2024       Lab Results   Component Value Date    CHOL 152 06/19/2024    CHLPL 135 04/15/2021    TRIG 50 06/19/2024    HDL 70 (H) 06/19/2024    LDL 71 06/19/2024       Lab Results   Component Value Date    WBC 5.68 06/19/2024    HGB 13.2 06/19/2024    HCT 42.3 06/19/2024    MCV 96.1 06/19/2024     06/19/2024                   Assessment and Plan            Annual physical exam  She is UTD on colonoscopy, last done 9/2023. Repeat anoscopy due March 2025 d/t h/o HSIL lesion.  She is following with Gastro now.  Pap smear is no longer indicated by history; s/p hysterectomy.  She is UTD on mammogram, last done 12/2023 and this was normal; ordered.  She is UTD on DEXA, last done  2/2023 and this showed osteopenia.  She is on Prolia.  She is UTD on COVID (9/2024), Prevnar 20 (6/2024), Tdap (8/2023), flu (9/2024).  She is due for Shingrix; can be done at the pharmacy.  She is due for routine labs including TSH; ordered.        Chronic bilateral low back pain with bilateral sciatica  Stable on current regimen.  Symptoms are well controlled.  No adverse effects. She does require ongoing use of this controlled substance to function.  Tox screen was due today.  Prior tox screen appropriate.  HAYLEY was run today.  Refills were not needed today.  RTC 3 months.  Orders:  •  Ambulatory Referral to Pain Management Clinic    High risk medication use    Orders:  •  POC Medline 12 Panel Urine Drug Screen    Essential hypertension    Blood pressure has been running at goal.  Continue lisinopril 5 mg daily.  Refills were not needed today.  Labs were not needed today.  Continue to monitor.       Mixed hyperlipidemia     Stable on atorvastatin 80 mg daily.  Refills were not needed today.  Labs were not due today.  Continue to monitor.       Coronary artery disease involving native coronary artery of native heart without angina pectoris    Follows with Wayne LUND, [facility].  Stable on aspirin, statin, ACE-I.       Acquired hypothyroidism  Stable on thyroxine 100 mcg daily.  Refills were not needed today.  Labs were due today.  Continue to monitor.  Orders:  •  TSH; Future    Generalized anxiety disorder    Stable on duloxetine 60 mg daily and buspirone 5 mg twice daily.  Refills were needed today.  Continue to monitor.  Orders:  •  busPIRone (BUSPAR) 5 MG tablet; Take 1 tablet by mouth 2 (Two) Times a Day.  •  DULoxetine (CYMBALTA) 60 MG capsule; Take 1 capsule by mouth Daily.    Age-related osteoporosis without current pathological fracture  Stable on Prolia.  Last DEXA was done 2/2023 showing improved osteopenia.       Gastroesophageal reflux disease without esophagitis  Stable on omeprazole  40 mg daily and famotidine 40 mg daily.  Refills were needed today.  Continue to monitor.  Wean PPI as able.  Orders:  •  famotidine (PEPCID) 40 MG tablet; Take 1 tablet by mouth Every Night.  •  omeprazole (priLOSEC) 40 MG capsule; Take 1 capsule by mouth Daily.    Hiatal hernia    Orders:  •  omeprazole (priLOSEC) 40 MG capsule; Take 1 capsule by mouth Daily.    Non-traumatic compression fracture of L1 lumbar vertebra with routine healing, subsequent encounter  Ongoing back pain.  Released from [facility].  Referral to Pain Management to see if she can get better symptomatic control.  Orders:  •  Ambulatory Referral to Pain Management Clinic    Screening mammogram for breast cancer    Orders:  •  Mammo Screening Digital Tomosynthesis Bilateral With CAD; Future            Follow Up   Return in about 3 months (around 3/10/2025) for Recheck.  Patient was given instructions and counseling regarding her condition or for health maintenance advice. Please see specific information pulled into the AVS if appropriate.

## 2024-12-16 DIAGNOSIS — K21.9 GASTROESOPHAGEAL REFLUX DISEASE WITHOUT ESOPHAGITIS: ICD-10-CM

## 2024-12-16 DIAGNOSIS — K44.9 HIATAL HERNIA: ICD-10-CM

## 2024-12-16 DIAGNOSIS — E03.9 ACQUIRED HYPOTHYROIDISM: ICD-10-CM

## 2024-12-16 DIAGNOSIS — F41.1 GENERALIZED ANXIETY DISORDER: ICD-10-CM

## 2024-12-16 RX ORDER — LEVOTHYROXINE SODIUM 100 UG/1
100 TABLET ORAL DAILY
Qty: 90 TABLET | Refills: 1 | OUTPATIENT
Start: 2024-12-16

## 2024-12-16 RX ORDER — BUSPIRONE HYDROCHLORIDE 5 MG/1
5 TABLET ORAL 2 TIMES DAILY
Qty: 180 TABLET | Refills: 1 | OUTPATIENT
Start: 2024-12-16

## 2024-12-16 RX ORDER — FAMOTIDINE 40 MG/1
40 TABLET, FILM COATED ORAL
Qty: 30 TABLET | Refills: 1 | OUTPATIENT
Start: 2024-12-16

## 2024-12-16 RX ORDER — OMEPRAZOLE 40 MG/1
40 CAPSULE, DELAYED RELEASE ORAL DAILY
Qty: 90 CAPSULE | Refills: 1 | OUTPATIENT
Start: 2024-12-16

## 2024-12-18 DIAGNOSIS — F41.1 GENERALIZED ANXIETY DISORDER: ICD-10-CM

## 2024-12-18 DIAGNOSIS — E03.9 ACQUIRED HYPOTHYROIDISM: ICD-10-CM

## 2024-12-18 RX ORDER — BUSPIRONE HYDROCHLORIDE 5 MG/1
5 TABLET ORAL 2 TIMES DAILY
Qty: 180 TABLET | Refills: 1 | OUTPATIENT
Start: 2024-12-18

## 2024-12-18 RX ORDER — LEVOTHYROXINE SODIUM 100 UG/1
100 TABLET ORAL DAILY
Qty: 90 TABLET | Refills: 1 | Status: SHIPPED | OUTPATIENT
Start: 2024-12-18

## 2025-01-11 DIAGNOSIS — M15.9 GENERALIZED OSTEOARTHRITIS: ICD-10-CM

## 2025-01-11 DIAGNOSIS — K21.9 GASTROESOPHAGEAL REFLUX DISEASE WITHOUT ESOPHAGITIS: ICD-10-CM

## 2025-01-12 NOTE — ASSESSMENT & PLAN NOTE
Stable on atorvastatin 80 mg daily.  Refills were not needed today.  Labs were not due today.  Continue to monitor.     I was present during the key portion of the procedure.

## 2025-01-14 RX ORDER — TRAMADOL HYDROCHLORIDE 50 MG/1
50 TABLET ORAL 2 TIMES DAILY PRN
Qty: 60 TABLET | Refills: 0 | Status: SHIPPED | OUTPATIENT
Start: 2025-01-14

## 2025-01-14 RX ORDER — FAMOTIDINE 40 MG/1
40 TABLET, FILM COATED ORAL
Qty: 30 TABLET | Refills: 0 | Status: SHIPPED | OUTPATIENT
Start: 2025-01-14

## 2025-01-28 ENCOUNTER — TELEPHONE (OUTPATIENT)
Dept: FAMILY MEDICINE CLINIC | Age: 65
End: 2025-01-28
Payer: MEDICARE

## 2025-01-28 NOTE — TELEPHONE ENCOUNTER
Pain Management called stating Dari Kay is prescribing Hydrocodone to Patient and she was taking both the Hydrocodone prescribed by her and the Tramadol you had prescribed her.  She requests that you do not refill her Tramadol any longer.

## 2025-02-10 RX ORDER — MELOXICAM 15 MG/1
15 TABLET ORAL DAILY PRN
Qty: 30 TABLET | Refills: 2 | Status: SHIPPED | OUTPATIENT
Start: 2025-02-10

## 2025-02-12 DIAGNOSIS — M15.9 GENERALIZED OSTEOARTHRITIS: ICD-10-CM

## 2025-02-12 RX ORDER — TRAMADOL HYDROCHLORIDE 50 MG/1
50 TABLET ORAL 2 TIMES DAILY PRN
Qty: 60 TABLET | Refills: 0 | Status: SHIPPED | OUTPATIENT
Start: 2025-02-12

## 2025-02-12 NOTE — TELEPHONE ENCOUNTER
Controlled refill request:  Requested Prescriptions     Pending Prescriptions Disp Refills    traMADol (ULTRAM) 50 MG tablet [Pharmacy Med Name: tramadol 50 mg tablet] 60 tablet 0     Sig: TAKE ONE TABLET BY MOUTH TWICE DAILY AS NEEDED FOR PAIN      Last OV:  12/10/2024  Next OV:  3/27/2025  Last fill:  1/15/25  Last tox:  12/10/24

## 2025-02-21 ENCOUNTER — TELEPHONE (OUTPATIENT)
Dept: FAMILY MEDICINE CLINIC | Age: 65
End: 2025-02-21
Payer: MEDICARE

## 2025-02-21 DIAGNOSIS — Z78.0 POSTMENOPAUSAL: Primary | ICD-10-CM

## 2025-02-21 NOTE — TELEPHONE ENCOUNTER
----- Message from Jammie Phillips sent at 2/22/2023  4:26 PM EST -----  Please call pt and let her know that she is due for repeat DEXA.  Please pend order and send to me to sign.  Thanks, MASON

## 2025-02-27 ENCOUNTER — OFFICE VISIT (OUTPATIENT)
Dept: GASTROENTEROLOGY | Facility: CLINIC | Age: 65
End: 2025-02-27
Payer: MEDICARE

## 2025-02-27 VITALS
BODY MASS INDEX: 25.27 KG/M2 | WEIGHT: 148 LBS | HEART RATE: 67 BPM | DIASTOLIC BLOOD PRESSURE: 92 MMHG | HEIGHT: 64 IN | SYSTOLIC BLOOD PRESSURE: 149 MMHG

## 2025-02-27 DIAGNOSIS — K59.04 CHRONIC IDIOPATHIC CONSTIPATION: Primary | ICD-10-CM

## 2025-02-27 DIAGNOSIS — K62.82 ANAL DYSPLASIA: ICD-10-CM

## 2025-02-27 DIAGNOSIS — Z86.0100 HISTORY OF COLON POLYPS: ICD-10-CM

## 2025-02-27 DIAGNOSIS — R14.0 ABDOMINAL BLOATING: ICD-10-CM

## 2025-02-27 PROBLEM — S32.000A COMPRESSION FRACTURE OF LUMBAR VERTEBRA: Status: ACTIVE | Noted: 2024-12-26

## 2025-02-27 PROBLEM — G89.29 CHRONIC PAIN: Status: ACTIVE | Noted: 2024-12-26

## 2025-02-27 PROBLEM — M54.17 LUMBOSACRAL RADICULOPATHY: Status: ACTIVE | Noted: 2024-12-26

## 2025-02-27 PROBLEM — M47.816 LUMBAR SPONDYLOSIS: Status: ACTIVE | Noted: 2024-12-26

## 2025-02-27 RX ORDER — POLYETHYLENE GLYCOL 3350 17 G/17G
17 POWDER, FOR SOLUTION ORAL DAILY
Qty: 527 G | Refills: 1 | Status: SHIPPED | OUTPATIENT
Start: 2025-02-27

## 2025-02-27 RX ORDER — SOD SULF/POT CHLORIDE/MAG SULF 1.479 G
12 TABLET ORAL TAKE AS DIRECTED
Qty: 24 TABLET | Refills: 0 | Status: SHIPPED | OUTPATIENT
Start: 2025-02-27

## 2025-02-27 RX ORDER — PLECANATIDE 3 MG/1
3 TABLET ORAL DAILY
Qty: 90 TABLET | Refills: 1 | Status: SHIPPED | OUTPATIENT
Start: 2025-02-27

## 2025-02-27 RX ORDER — HYDROCODONE BITARTRATE AND ACETAMINOPHEN 7.5; 325 MG/1; MG/1
1 TABLET ORAL 2 TIMES DAILY PRN
COMMUNITY

## 2025-02-27 NOTE — PROGRESS NOTES
Chief Complaint     Constipation (Follow up/Meds are helping )    History of Present Illness     Addie Yates is a 64 y.o. female who presents to [facility] for follow-up of constipation.      Constipation is improved with trulance.  She's having a bowel movement 1-2 times per week.  Reports some upper abdominal discomfort and bloating    She is due for repeat rectal exam per colorectal surgery.    She is also due for screening colonoscopy.       History      Past Medical History:   Diagnosis Date    Anxiety disorder, unspecified     Atherosclerotic heart disease of native coronary artery without angina pectoris     Carpal tunnel syndrome on right     Chronic pain     BASE OF SKULL/UPPERMOST NECK    Constipation, unspecified     DDD (degenerative disc disease), cervical     DDD (degenerative disc disease), lumbar     Diaphragmatic hernia without obstruction and without gangrene     Dyskinesia of esophagus     Gastroparesis     GERD (gastroesophageal reflux disease)     Glaucoma     Hiatal hernia     Hypothyroidism, unspecified     Long term (current) use of opiate analgesic     Medial epicondylitis     MI (myocardial infarction)     2015    Osteoarthritis     Osteoporosis without current pathological fracture     Other idiopathic scoliosis, site unspecified     Presence of intraocular lens     Primary generalized (osteo)arthritis     Right lateral epicondylitis     Toxic goiter     Unspecified hemorrhoids      Past Surgical History:   Procedure Laterality Date    CARPAL TUNNEL RELEASE Left     COLONOSCOPY      2007, 2010, 2015 - WITH ADENOMA    COLONOSCOPY N/A 9/11/2023    Procedure: COLONOSCOPY WITH POLYPECTOMY AND BIOPSIES;  Surgeon: Yodit Leroy MD;  Location: [facility] ENDOSCOPY;  Service: Gastroenterology;  Laterality: N/A;  COLON POLYP    CORONARY ANGIOPLASTY WITH STENT PLACEMENT  11/2015    ENDOSCOPY      2008, 2016    ENDOSCOPY N/A 11/04/2022    Procedure:  ESOPHAGOGASTRODUODENOSCOPY with biopsy;  Surgeon: Ydoit Leroy MD;  Location: [facility] ENDOSCOPY;  Service: Gastroenterology;  Laterality: N/A;  gastritis, hiatal hernia    FOOT SURGERY Right 05/15/2023    HYSTERECTOMY  2004    LAPAROSCOPIC CHOLECYSTECTOMY  2017    REFRACTIVE SURGERY Bilateral     THYROID BIOPSY Right     TUBAL ABDOMINAL LIGATION      BI-TUBAL    UPPER GASTROINTESTINAL ENDOSCOPY       Family History   Problem Relation Age of Onset    Leukemia Other     Kidney cancer Other     Diabetes Other     Malig Hyperthermia Neg Hx         Current Medications       Current Outpatient Medications:     aspirin 81 MG EC tablet, Take 1 tablet by mouth Every Night., Disp: , Rfl:     atorvastatin (LIPITOR) 80 MG tablet, Take 1 tablet by mouth Daily., Disp: , Rfl:     busPIRone (BUSPAR) 5 MG tablet, Take 1 tablet by mouth 2 (Two) Times a Day., Disp: 180 tablet, Rfl: 1    Calcium Citrate-Vitamin D3 (CITRACAL) 315-6.25 MG-MCG tablet tablet, Take  by mouth Daily., Disp: , Rfl:     denosumab (Prolia) 60 MG/ML solution prefilled syringe syringe, Inject 1 mL under the skin into the appropriate area as directed Every 6 (Six) Months. Indications: Postmenopausal Osteoporosis, Disp: 1 mL, Rfl: 0    docusate sodium (COLACE) 100 MG capsule, Take 1 capsule by mouth 2 (Two) Times a Day., Disp: , Rfl:     DULoxetine (CYMBALTA) 60 MG capsule, Take 1 capsule by mouth Daily., Disp: 90 capsule, Rfl: 1    famotidine (PEPCID) 40 MG tablet, Take 1 tablet by mouth every night at bedtime., Disp: 30 tablet, Rfl: 0    HYDROcodone-acetaminophen (NORCO) 7.5-325 MG per tablet, Take 1 tablet by mouth 2 (Two) Times a Day As Needed. for pain, Disp: , Rfl:     ketoconazole (NIZORAL) 2 % shampoo, SHAMPOO SCALP 2-3 TIMES WEEKLY AS NEEDED. ALTERNATE WITH OTHER SHAMPOO, Disp: , Rfl:     levothyroxine (SYNTHROID, LEVOTHROID) 100 MCG tablet, TAKE ONE TABLET BY MOUTH DAILY, Disp: 90 tablet, Rfl: 1    lisinopril (PRINIVIL,ZESTRIL) 5 MG tablet, Take  "1 tablet by mouth Every Night., Disp: , Rfl:     Magnesium Citrate 100 MG capsule, Take 1 tablet by mouth Every Other Day., Disp: , Rfl:     meloxicam (MOBIC) 15 MG tablet, Take 1 tablet by mouth Daily As Needed for Moderate Pain., Disp: 30 tablet, Rfl: 2    nitroglycerin (Nitrostat) 0.4 MG SL tablet, Place 1 tablet under the tongue Every 5 (Five) Minutes As Needed for Chest Pain. Take no more than 3 doses in 15 minutes., Disp: 20 tablet, Rfl: 0    omeprazole (priLOSEC) 40 MG capsule, Take 1 capsule by mouth Daily., Disp: 90 capsule, Rfl: 1    Plecanatide (Trulance) 3 MG tablet, Take 1 tablet by mouth Daily., Disp: 90 tablet, Rfl: 1    saccharomyces boulardii (FLORASTOR) 250 MG capsule, Take 1 capsule by mouth Daily., Disp: , Rfl:     simethicone (MYLICON,GAS-X) 125 MG capsule capsule, Take 1 capsule by mouth As Needed., Disp: , Rfl:     Zoster Vac Recomb Adjuvanted (Shingrix) 50 MCG/0.5ML reconstituted suspension, Inject 0.5 mL into the appropriate muscle as directed by prescriber., Disp: 1 each, Rfl: 1    polyethylene glycol (MIRALAX) 17 GM/SCOOP powder, Take 17 g by mouth Daily., Disp: 527 g, Rfl: 1    Sodium Sulfate-Mag Sulfate-KCl (Sutab) 0465-857-525 MG tablet, Take 12 tablets by mouth Take As Directed. Take 12 tablets at 6 pm and 12 tablets 4 hours prior to procedure., Disp: 24 tablet, Rfl: 0    Current Facility-Administered Medications:     denosumab (PROLIA) syringe 60 mg, 60 mg, Subcutaneous, Q6 Months, Jammie Phillips MD, 60 mg at 11/22/24 1138     Allergies     No Known Allergies    Social History       Social History     Social History Narrative    Not on file         Objective       /92   Pulse 67   Ht 162.6 cm (64.02\")   Wt 67.1 kg (148 lb)   BMI 25.39 kg/m²       Physical Exam  Constitutional:       General: She is not in acute distress.     Appearance: Normal appearance. She is well-developed and normal weight.   HENT:      Head: Normocephalic and atraumatic.   Eyes:      " Conjunctiva/sclera: Conjunctivae normal.      Pupils: Pupils are equal, round, and reactive to light.      Visual Fields: Right eye visual fields normal and left eye visual fields normal.   Cardiovascular:      Rate and Rhythm: Normal rate.   Pulmonary:      Effort: Pulmonary effort is normal. No respiratory distress or retractions.      Breath sounds: Normal air entry.   Musculoskeletal:         General: Normal range of motion.      Right lower leg: No edema.      Left lower leg: No edema.   Skin:     General: Skin is warm and dry.      Findings: No lesion.   Neurological:      General: No focal deficit present.      Mental Status: She is alert and oriented to person, place, and time.   Psychiatric:         Mood and Affect: Mood and affect normal.         Behavior: Behavior normal.         Results       Result Review :                     Assessment and Plan              Diagnoses and all orders for this visit:    1. Chronic idiopathic constipation (Primary)  -     Plecanatide (Trulance) 3 MG tablet; Take 1 tablet by mouth Daily.  Dispense: 90 tablet; Refill: 1  -     polyethylene glycol (MIRALAX) 17 GM/SCOOP powder; Take 17 g by mouth Daily.  Dispense: 527 g; Refill: 1  -     Case Request; Standing  -     Case Request    2. Abdominal bloating    3. Anal dysplasia  -     Case Request; Standing  -     Case Request    4. History of colon polyps  -     Case Request; Standing  -     Case Request    Other orders  -     Follow Anesthesia Guidelines / Protocol; Standing  -     Follow Anesthesia Guidelines / Protocol; Future  -     Verify NPO; Standing  -     Verify Bowel Prep Was Successful; Standing  -     Give Tap Water Enema If Bowel Prep Insufficient; Standing  -     Sodium Sulfate-Mag Sulfate-KCl (Sutab) 3521-231-263 MG tablet; Take 12 tablets by mouth Take As Directed. Take 12 tablets at 6 pm and 12 tablets 4 hours prior to procedure.  Dispense: 24 tablet; Refill: 0      Encourage patient to call colorectal surgery  office to get scheduled for rectal exam.  Recommend to add MiraLAX to daily Trulance to improve bowel pattern.    COLONOSCOPY (N/A)Surgical Risk and Benefits discussed: Possible risks/complications, benefits, and alternatives to surgical or invasive procedure have been explained to patient and/or legal guardian; risks include bleeding, infection, and perforation. Patient has been evaluated and can tolerate anesthesia and/or sedation. Risks, benefits, and alternatives to anesthesia and sedation have been explained to patient and/or legal guardian.        Follow Up     Follow Up   Return in about 9 months (around 11/27/2025) for constipation.  Patient was given instructions and counseling regarding her condition or for health maintenance advice. Please see specific information pulled into the AVS if appropriate.

## 2025-03-06 ENCOUNTER — HOSPITAL ENCOUNTER (OUTPATIENT)
Dept: MAMMOGRAPHY | Facility: HOSPITAL | Age: 65
Discharge: HOME | End: 2025-03-06
Payer: MEDICARE

## 2025-03-06 ENCOUNTER — HOSPITAL ENCOUNTER (OUTPATIENT)
Dept: BONE DENSITY | Facility: HOSPITAL | Age: 65
Discharge: HOME | End: 2025-03-06
Payer: MEDICARE

## 2025-03-06 DIAGNOSIS — Z78.0 POSTMENOPAUSAL: ICD-10-CM

## 2025-03-06 DIAGNOSIS — Z12.31 SCREENING MAMMOGRAM FOR BREAST CANCER: ICD-10-CM

## 2025-03-06 PROCEDURE — 77067 SCR MAMMO BI INCL CAD: CPT

## 2025-03-06 PROCEDURE — 77080 DXA BONE DENSITY AXIAL: CPT

## 2025-03-06 PROCEDURE — 77063 BREAST TOMOSYNTHESIS BI: CPT

## 2025-03-11 ENCOUNTER — RESULTS FOLLOW-UP (OUTPATIENT)
Dept: BONE DENSITY | Facility: HOSPITAL | Age: 65
End: 2025-03-11
Payer: MEDICARE

## 2025-03-17 DIAGNOSIS — K21.9 GASTROESOPHAGEAL REFLUX DISEASE WITHOUT ESOPHAGITIS: ICD-10-CM

## 2025-03-18 RX ORDER — FAMOTIDINE 40 MG/1
40 TABLET, FILM COATED ORAL
Qty: 30 TABLET | Refills: 5 | Status: SHIPPED | OUTPATIENT
Start: 2025-03-18

## 2025-03-27 ENCOUNTER — OFFICE VISIT (OUTPATIENT)
Dept: FAMILY MEDICINE CLINIC | Age: 65
End: 2025-03-27
Payer: MEDICARE

## 2025-03-27 ENCOUNTER — LAB (OUTPATIENT)
Dept: LAB | Facility: HOSPITAL | Age: 65
End: 2025-03-27
Payer: MEDICARE

## 2025-03-27 VITALS
HEIGHT: 64 IN | WEIGHT: 150 LBS | BODY MASS INDEX: 25.61 KG/M2 | HEART RATE: 72 BPM | TEMPERATURE: 98.6 F | DIASTOLIC BLOOD PRESSURE: 106 MMHG | SYSTOLIC BLOOD PRESSURE: 156 MMHG | OXYGEN SATURATION: 93 %

## 2025-03-27 DIAGNOSIS — E03.9 ACQUIRED HYPOTHYROIDISM: ICD-10-CM

## 2025-03-27 DIAGNOSIS — K21.9 GASTROESOPHAGEAL REFLUX DISEASE WITHOUT ESOPHAGITIS: ICD-10-CM

## 2025-03-27 DIAGNOSIS — I10 ESSENTIAL HYPERTENSION: ICD-10-CM

## 2025-03-27 DIAGNOSIS — M81.0 AGE-RELATED OSTEOPOROSIS WITHOUT CURRENT PATHOLOGICAL FRACTURE: ICD-10-CM

## 2025-03-27 DIAGNOSIS — F41.1 GENERALIZED ANXIETY DISORDER: ICD-10-CM

## 2025-03-27 DIAGNOSIS — E78.2 MIXED HYPERLIPIDEMIA: ICD-10-CM

## 2025-03-27 DIAGNOSIS — G89.4 CHRONIC PAIN SYNDROME: Primary | ICD-10-CM

## 2025-03-27 DIAGNOSIS — I25.10 CORONARY ARTERY DISEASE INVOLVING NATIVE CORONARY ARTERY OF NATIVE HEART WITHOUT ANGINA PECTORIS: ICD-10-CM

## 2025-03-27 PROBLEM — Z79.899 HIGH RISK MEDICATION USE: Status: RESOLVED | Noted: 2021-07-20 | Resolved: 2025-03-27

## 2025-03-27 LAB
ALBUMIN SERPL-MCNC: 4.2 G/DL (ref 3.5–5.2)
ALBUMIN/GLOB SERPL: 1.8 G/DL
ALP SERPL-CCNC: 134 U/L (ref 39–117)
ALT SERPL W P-5'-P-CCNC: 38 U/L (ref 1–33)
ANION GAP SERPL CALCULATED.3IONS-SCNC: 11.2 MMOL/L (ref 5–15)
AST SERPL-CCNC: 64 U/L (ref 1–32)
BILIRUB SERPL-MCNC: 0.5 MG/DL (ref 0–1.2)
BUN SERPL-MCNC: 20 MG/DL (ref 8–23)
BUN/CREAT SERPL: 23.5 (ref 7–25)
CALCIUM SPEC-SCNC: 8.6 MG/DL (ref 8.6–10.5)
CHLORIDE SERPL-SCNC: 105 MMOL/L (ref 98–107)
CHOLEST SERPL-MCNC: 136 MG/DL (ref 0–200)
CO2 SERPL-SCNC: 24.8 MMOL/L (ref 22–29)
CREAT SERPL-MCNC: 0.85 MG/DL (ref 0.57–1)
EGFRCR SERPLBLD CKD-EPI 2021: 75.7 ML/MIN/1.73
GLOBULIN UR ELPH-MCNC: 2.3 GM/DL
GLUCOSE SERPL-MCNC: 101 MG/DL (ref 65–99)
HDLC SERPL-MCNC: 62 MG/DL (ref 40–60)
LDLC SERPL CALC-MCNC: 64 MG/DL (ref 0–100)
LDLC/HDLC SERPL: 1.05 {RATIO}
POTASSIUM SERPL-SCNC: 4.1 MMOL/L (ref 3.5–5.2)
PROT SERPL-MCNC: 6.5 G/DL (ref 6–8.5)
SODIUM SERPL-SCNC: 141 MMOL/L (ref 136–145)
TRIGL SERPL-MCNC: 44 MG/DL (ref 0–150)
TSH SERPL DL<=0.05 MIU/L-ACNC: 5.06 UIU/ML (ref 0.27–4.2)
VLDLC SERPL-MCNC: 10 MG/DL (ref 5–40)

## 2025-03-27 PROCEDURE — 36415 COLL VENOUS BLD VENIPUNCTURE: CPT

## 2025-03-27 PROCEDURE — 80061 LIPID PANEL: CPT

## 2025-03-27 PROCEDURE — 80053 COMPREHEN METABOLIC PANEL: CPT

## 2025-03-27 PROCEDURE — 84443 ASSAY THYROID STIM HORMONE: CPT

## 2025-03-27 RX ORDER — DULOXETIN HYDROCHLORIDE 60 MG/1
60 CAPSULE, DELAYED RELEASE ORAL DAILY
Qty: 90 CAPSULE | Refills: 1 | Status: SHIPPED | OUTPATIENT
Start: 2025-03-27

## 2025-03-27 RX ORDER — LEVOTHYROXINE SODIUM 100 UG/1
100 TABLET ORAL DAILY
Qty: 90 TABLET | Refills: 1 | Status: SHIPPED | OUTPATIENT
Start: 2025-03-27 | End: 2025-03-28 | Stop reason: SDUPTHER

## 2025-03-27 NOTE — ASSESSMENT & PLAN NOTE
Blood pressure has been running at goal.  Continue lisinopril 5 mg daily.  Refills were not needed today.  Labs were needed today.  Continue to monitor.  Orders:    Lipid Panel; Future    Comprehensive Metabolic Panel; Future

## 2025-03-27 NOTE — ASSESSMENT & PLAN NOTE
Stable on levothyroxine 100 mcg daily.  Refills were needed today.  Labs were due today.  Continue to monitor.  Orders:    TSH; Future    levothyroxine (SYNTHROID, LEVOTHROID) 100 MCG tablet; Take 1 tablet by mouth Daily.

## 2025-03-27 NOTE — ASSESSMENT & PLAN NOTE
Following with GI.  Stable on omeprazole 40 mg daily and famotidine 40 mg daily.  Refills were not needed today.  Continue to monitor.  Wean PPI as able.

## 2025-03-27 NOTE — ASSESSMENT & PLAN NOTE
Stable on duloxetine 60 mg daily.  Refills were needed today.  Continue to monitor.  Orders:    DULoxetine (CYMBALTA) 60 MG capsule; Take 1 capsule by mouth Daily.

## 2025-03-27 NOTE — ASSESSMENT & PLAN NOTE
Follows with Wayne LUND, [facility].  Stable on aspirin, statin, ACE-I.

## 2025-03-27 NOTE — PROGRESS NOTES
"Chief Complaint  Back Pain    Subjective          Addie Yates presents to [facility] today for routine f/u of chronic issues.    She is on Norco and duloxetine for cervicalgia and chronic low back pain d/t diffuse OA and scoliosis.  Now following with Dari Kay at Pain Management who is managing the controlled prescription.  She has previously been on tramadol and gabapentin (ineffective). S/p PT, chiropractor, and [facility] for injections (ineffective).  +Lumbar compression fx 7/2024.  No interventions planned at this point other than med management.     She is on lisinopril for HTN.  Her blood pressure has been well controlled.  No CP, palpitations or SOB.  She is on atorvastatin for HLD and CAD.  No myalgias.  She is on aspirin for CAD.  S/p stenting 11/2015.  She follows with Cardiology Wayne LUND.      She is on levothyroxine for hypothyroidism.  No heat/cold intolerance, no changes in hair or skin.      She is on duloxetine and buspirone for anxiety.  Mood has been \"so-so.\"  Mood has primarily been affected by her other heatlh issues -- her back pain and GERD.  Anxiety has been fairly well controlled.     She is on omeprazole for GERD and esophageal spasm.  Last EGD by Dr. Leroy showed hiatal hernia and gastritis.  Gastric emptying study has shown gastroparesis.  She is on Trulance and docusate for constipation.  Trulance is working \"better than any of them.\"  She has previously been on Motegrity and lactulose.  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  She is following with Colorectal Surgery and Thuy Caldwell/Dr. Leroy Gastro.  She is due for repeat screening colonoscopy, which is being done yearly at this point due to history of rectal mass diagnosed as HSIL.     She is on Prolia for osteoporosis.  She is up-to-date on DEXA, last done 3/2025 and this showed osteopenia.       Current Outpatient Medications:   •  aspirin 81 MG EC " tablet, Take 1 tablet by mouth Every Night., Disp: , Rfl:   •  atorvastatin (LIPITOR) 80 MG tablet, Take 1 tablet by mouth Daily., Disp: , Rfl:   •  busPIRone (BUSPAR) 5 MG tablet, Take 1 tablet by mouth 2 (Two) Times a Day., Disp: 180 tablet, Rfl: 1  •  Calcium Citrate-Vitamin D3 (CITRACAL) 315-6.25 MG-MCG tablet tablet, Take  by mouth Daily., Disp: , Rfl:   •  denosumab (Prolia) 60 MG/ML solution prefilled syringe syringe, Inject 1 mL under the skin into the appropriate area as directed Every 6 (Six) Months. Indications: Postmenopausal Osteoporosis, Disp: 1 mL, Rfl: 0  •  docusate sodium (COLACE) 100 MG capsule, Take 1 capsule by mouth 2 (Two) Times a Day., Disp: , Rfl:   •  DULoxetine (CYMBALTA) 60 MG capsule, Take 1 capsule by mouth Daily., Disp: 90 capsule, Rfl: 1  •  famotidine (PEPCID) 40 MG tablet, TAKE ONE TABLET BY MOUTH EVERY NIGHT AT BEDTIME, Disp: 30 tablet, Rfl: 5  •  HYDROcodone-acetaminophen (NORCO) 7.5-325 MG per tablet, Take 1 tablet by mouth 2 (Two) Times a Day As Needed. for pain, Disp: , Rfl:   •  ketoconazole (NIZORAL) 2 % shampoo, SHAMPOO SCALP 2-3 TIMES WEEKLY AS NEEDED. ALTERNATE WITH OTHER SHAMPOO, Disp: , Rfl:   •  levothyroxine (SYNTHROID, LEVOTHROID) 100 MCG tablet, Take 1 tablet by mouth Daily., Disp: 90 tablet, Rfl: 1  •  lisinopril (PRINIVIL,ZESTRIL) 5 MG tablet, Take 1 tablet by mouth Every Night., Disp: , Rfl:   •  Magnesium Citrate 100 MG capsule, Take 1 tablet by mouth Every Other Day., Disp: , Rfl:   •  meloxicam (MOBIC) 15 MG tablet, Take 1 tablet by mouth Daily As Needed for Moderate Pain., Disp: 30 tablet, Rfl: 2  •  nitroglycerin (Nitrostat) 0.4 MG SL tablet, Place 1 tablet under the tongue Every 5 (Five) Minutes As Needed for Chest Pain. Take no more than 3 doses in 15 minutes., Disp: 20 tablet, Rfl: 0  •  omeprazole (priLOSEC) 40 MG capsule, Take 1 capsule by mouth Daily., Disp: 90 capsule, Rfl: 1  •  Plecanatide (Trulance) 3 MG tablet, Take 1 tablet by mouth Daily., Disp:  "90 tablet, Rfl: 1  •  polyethylene glycol (MIRALAX) 17 GM/SCOOP powder, Take 17 g by mouth Daily., Disp: 527 g, Rfl: 1  •  saccharomyces boulardii (FLORASTOR) 250 MG capsule, Take 1 capsule by mouth Daily., Disp: , Rfl:   •  simethicone (MYLICON,GAS-X) 125 MG capsule capsule, Take 1 capsule by mouth As Needed., Disp: , Rfl:     Current Facility-Administered Medications:   •  denosumab (PROLIA) syringe 60 mg, 60 mg, Subcutaneous, Q6 Months, Jammie Phillips MD, 60 mg at 11/22/24 1138  Medications Discontinued During This Encounter   Medication Reason   • Sodium Sulfate-Mag Sulfate-KCl (Sutab) 3641-919-304 MG tablet Historical Med - Therapy completed   • Zoster Vac Recomb Adjuvanted (Shingrix) 50 MCG/0.5ML reconstituted suspension Historical Med - Therapy completed   • levothyroxine (SYNTHROID, LEVOTHROID) 100 MCG tablet Reorder   • DULoxetine (CYMBALTA) 60 MG capsule Reorder         Allergies:  Patient has no known allergies.      Objective   Vital Signs:   Vitals:    03/27/25 1121   BP: 147/94   BP Location: Left arm   Patient Position: Sitting   Cuff Size: Adult   Pulse: 72   Temp: 98.6 °F (37 °C)   TempSrc: Oral   SpO2: 93%   Weight: 59 kg (130 lb)   Height: 162.6 cm (64.02\")     Body mass index is 22.3 kg/m².           Physical Exam  Vitals reviewed.   Constitutional:       General: She is not in acute distress.     Appearance: Normal appearance. She is well-developed.   HENT:      Head: Normocephalic and atraumatic.      Right Ear: External ear normal.      Left Ear: External ear normal.   Eyes:      Extraocular Movements: Extraocular movements intact.      Conjunctiva/sclera: Conjunctivae normal.      Pupils: Pupils are equal, round, and reactive to light.   Cardiovascular:      Rate and Rhythm: Normal rate and regular rhythm.      Heart sounds: No murmur heard.  Pulmonary:      Effort: Pulmonary effort is normal.      Breath sounds: Normal breath sounds. No wheezing, rhonchi or rales.   Abdominal:      " General: Bowel sounds are normal. There is no distension.      Palpations: Abdomen is soft.      Tenderness: There is no abdominal tenderness.   Musculoskeletal:         General: Normal range of motion.   Neurological:      Mental Status: She is alert.   Psychiatric:         Mood and Affect: Affect normal.         Lab Results   Component Value Date    GLUCOSE 93 08/27/2024    BUN 18 08/27/2024    CREATININE 0.91 08/27/2024    EGFRIFNONA 78 12/29/2021    EGFRIFAFRI 76 02/28/2018    BCR 19.8 08/27/2024    K 4.2 08/27/2024    CO2 27.8 08/27/2024    CALCIUM 10.2 08/27/2024    ALBUMIN 4.4 08/27/2024    AST 24 08/27/2024    ALT 19 08/27/2024       Lab Results   Component Value Date    CHOL 152 06/19/2024    CHLPL 135 04/15/2021    TRIG 50 06/19/2024    HDL 70 (H) 06/19/2024    LDL 71 06/19/2024       Lab Results   Component Value Date    WBC 5.68 06/19/2024    HGB 13.2 06/19/2024    HCT 42.3 06/19/2024    MCV 96.1 06/19/2024     06/19/2024            Assessment and Plan    Assessment & Plan  Chronic pain syndrome  Now following with Pain Management.       Essential hypertension    Blood pressure has been running at goal.  Continue lisinopril 5 mg daily.  Refills were not needed today.  Labs were needed today.  Continue to monitor.  Orders:  •  Lipid Panel; Future  •  Comprehensive Metabolic Panel; Future    Mixed hyperlipidemia     Stable on atorvastatin 80 mg daily.  Refills were not needed today.  Labs were due today.  Continue to monitor.       Coronary artery disease involving native coronary artery of native heart without angina pectoris    Follows with Wayne LUND, [facility].  Stable on aspirin, statin, ACE-I.       Acquired hypothyroidism  Stable on levothyroxine 100 mcg daily.  Refills were needed today.  Labs were due today.  Continue to monitor.  Orders:  •  TSH; Future  •  levothyroxine (SYNTHROID, LEVOTHROID) 100 MCG tablet; Take 1 tablet by mouth Daily.    Generalized anxiety  disorder    Stable on duloxetine 60 mg daily.  Refills were needed today.  Continue to monitor.  Orders:  •  DULoxetine (CYMBALTA) 60 MG capsule; Take 1 capsule by mouth Daily.    Gastroesophageal reflux disease without esophagitis  Following with GI.  Stable on omeprazole 40 mg daily and famotidine 40 mg daily.  Refills were not needed today.  Continue to monitor.  Wean PPI as able.       Age-related osteoporosis without current pathological fracture  Stable on Prolia.  Last DEXA was done 3/2025 showing improved osteopenia.                 Follow Up   Return in about 6 months (around 9/27/2025) for Recheck.  Patient was given instructions and counseling regarding her condition or for health maintenance advice. Please see specific information pulled into the AVS if appropriate.           03/27/2025

## 2025-03-28 ENCOUNTER — RESULTS FOLLOW-UP (OUTPATIENT)
Dept: FAMILY MEDICINE CLINIC | Age: 65
End: 2025-03-28
Payer: MEDICARE

## 2025-03-28 DIAGNOSIS — E03.9 ACQUIRED HYPOTHYROIDISM: ICD-10-CM

## 2025-03-28 RX ORDER — LEVOTHYROXINE SODIUM 112 UG/1
112 TABLET ORAL DAILY
Qty: 30 TABLET | Refills: 5 | Status: SHIPPED | OUTPATIENT
Start: 2025-03-28

## 2025-03-28 RX ORDER — LEVOTHYROXINE SODIUM 88 UG/1
88 TABLET ORAL DAILY
Qty: 30 TABLET | Refills: 5 | Status: SHIPPED | OUTPATIENT
Start: 2025-03-28 | End: 2025-03-28

## 2025-04-07 ENCOUNTER — TELEPHONE (OUTPATIENT)
Dept: FAMILY MEDICINE CLINIC | Age: 65
End: 2025-04-07
Payer: MEDICARE

## 2025-04-07 NOTE — TELEPHONE ENCOUNTER
Pt due for Prolia 5/22/25    Last Dexa -1.5  DEXA Bone Density Axial (03/06/2025 11:25)     Last CMP- Calcium 8.6  Comprehensive Metabolic Panel (03/27/2025 11:58)     Last office Visit   Office Visit with Jammie Phillips MD (03/27/2025)     Ok to proceed?

## 2025-04-08 ENCOUNTER — TELEPHONE (OUTPATIENT)
Dept: GASTROENTEROLOGY | Facility: CLINIC | Age: 65
End: 2025-04-08
Payer: MEDICARE

## 2025-04-08 ENCOUNTER — TELEPHONE (OUTPATIENT)
Dept: FAMILY MEDICINE CLINIC | Age: 65
End: 2025-04-08
Payer: MEDICARE

## 2025-04-08 NOTE — TELEPHONE ENCOUNTER
ENDO RECONCILIATION  Verify source of procedure(s): Office visit  If other, please list source: 2/27/25    TIME OUT-CONFIRM CORRECT PROCEDURE: Colonoscopy  Cardiology: Baldev  Pulmonology:  Blood thinner:   GLP-1:  Additional DX/indication for procedure:     Please include any other notes relevant to endo reconciliation:     Cardiac clearance received from Wayne Daniels on 2/28/25 (media), expires on 4/28/25.

## 2025-04-08 NOTE — TELEPHONE ENCOUNTER
Caller: Addie Yates    Relationship to patient: Self    Best call back number: [phone]     Patient is needing: PATIENT CALLED IN WANTING TO UPDATE MD CARLOS REGARDING THE TRAMADOL. PATIENT STATES THE PHARMACY COULD NOT TAKE IT BACK BECAUSE IT WAS A NARCOTIC BUT THE [PHARMACY DID GIVE THE PATIENT A SOLUTION TO DISSOLVE THE MEDICATION. PATIENT JUST WANTED TO LET MD CARLOS KNOW.

## 2025-04-12 DIAGNOSIS — M15.9 GENERALIZED OSTEOARTHRITIS: ICD-10-CM

## 2025-04-14 RX ORDER — TRAMADOL HYDROCHLORIDE 50 MG/1
50 TABLET ORAL 2 TIMES DAILY PRN
Qty: 60 TABLET | Refills: 0 | OUTPATIENT
Start: 2025-04-14

## 2025-04-15 NOTE — TELEPHONE ENCOUNTER
I actually had recommended that she take it to pain management so they could count it and know that she hadn't used any, but as long as she communicates with them, it will hopefully be okay.  Thanks, MASON

## 2025-04-21 ENCOUNTER — TELEPHONE (OUTPATIENT)
Dept: GASTROENTEROLOGY | Facility: CLINIC | Age: 65
End: 2025-04-21
Payer: MEDICARE

## 2025-04-21 DIAGNOSIS — M81.0 AGE-RELATED OSTEOPOROSIS WITHOUT CURRENT PATHOLOGICAL FRACTURE: ICD-10-CM

## 2025-04-21 RX ORDER — DENOSUMAB 60 MG/ML
INJECTION SUBCUTANEOUS
Qty: 1 EACH | Refills: 0 | Status: SHIPPED | OUTPATIENT
Start: 2025-04-21

## 2025-04-21 RX ORDER — SOD SULF/POT CHLORIDE/MAG SULF 1.479 G
12 TABLET ORAL 2 TIMES DAILY
Qty: 24 TABLET | Refills: 0 | Status: ON HOLD | OUTPATIENT
Start: 2025-04-21 | End: 2025-04-25

## 2025-04-21 NOTE — TELEPHONE ENCOUNTER
Pt left voicemail stating she needs an RX for Sutab,     She also states that she needs a PA for Trulance will start this

## 2025-04-24 ENCOUNTER — ANESTHESIA EVENT (OUTPATIENT)
Dept: GASTROENTEROLOGY | Facility: HOSPITAL | Age: 65
End: 2025-04-24
Payer: MEDICARE

## 2025-04-24 NOTE — ANESTHESIA PREPROCEDURE EVALUATION
Anesthesia Evaluation     Patient summary reviewed and Nursing notes reviewed   history of anesthetic complications:  prolonged sedation  NPO Solid Status: > 8 hours  NPO Liquid Status: > 2 hours           Airway   Mallampati: I  TM distance: >3 FB  Neck ROM: full  No difficulty expected  Dental    (+) edentulous and upper dentures    Pulmonary - normal exam   (+) a smoker (quit 2004) Former,  Cardiovascular   Exercise tolerance: good (4-7 METS)    ECG reviewed  Rhythm: regular  Rate: normal    (+) hypertension, past MI (2015) , CAD, cardiac stents more than 12 months ago , hyperlipidemia      Neuro/Psych  (+) numbness (lumbosacral radiculopathy, sciatica), psychiatric history Anxiety  GI/Hepatic/Renal/Endo    (+) hiatal hernia, GERD well controlled, thyroid problem hypothyroidism    Musculoskeletal     Abdominal    Substance History      OB/GYN          Other   arthritis,     ROS/Med Hx Other: Cardiac clearance Dr. Daniels 2/28/25, acceptable risks    Cards note 12/2024:  Patient returns for follow-up today, stable symptoms denying any angina or unusual dyspnea. Reports compression fractures in her spine is due to see pain management for that reports last x-rays demonstrated stable findings due to see pain management for control of symptoms. Negative ischemic workup for us last winter, Holter monitor also not demonstrating any significant dysrhythmia. Preserved left ventricular systolic function on stress nuclear images (EF50%). Will continue current plan of care. Additionally lab work earlier this year demonstrated controlled dyslipidemia with very good LDL to HDL ratio. Preserved renal and hepatic function and stable blood count. Routine follow-up within 1 year.    EKG 12/7/23:  Rhythm: sinus rhythm  Rate: normal  BPM: 67  Conduction: conduction normal  ST Segments: ST segments normal  T flattening: I, aVL and V3 (biphasic V4-V6)  QRS axis: normal  Other: no other findings                        Anesthesia  Plan    ASA 3     general     (Total IV Anesthesia    Patient understands anesthesia not responsible for dental damage.      Discussed risks with pt including aspiration, allergic reactions, apnea, advanced airway placement. Pt verbalized understanding. All questions answered.  )  intravenous induction     Anesthetic plan, risks, benefits, and alternatives have been provided, discussed and informed consent has been obtained with: patient.    Plan discussed with CRNA.        CODE STATUS:

## 2025-04-25 ENCOUNTER — ANESTHESIA (OUTPATIENT)
Dept: GASTROENTEROLOGY | Facility: HOSPITAL | Age: 65
End: 2025-04-25
Payer: MEDICARE

## 2025-04-25 ENCOUNTER — HOSPITAL ENCOUNTER (OUTPATIENT)
Facility: HOSPITAL | Age: 65
Setting detail: HOSPITAL OUTPATIENT SURGERY
Discharge: HOME | End: 2025-04-25
Attending: INTERNAL MEDICINE | Admitting: INTERNAL MEDICINE
Payer: MEDICARE

## 2025-04-25 VITALS
WEIGHT: 147.93 LBS | SYSTOLIC BLOOD PRESSURE: 127 MMHG | HEIGHT: 61 IN | DIASTOLIC BLOOD PRESSURE: 91 MMHG | TEMPERATURE: 97.8 F | BODY MASS INDEX: 27.93 KG/M2 | HEART RATE: 88 BPM | RESPIRATION RATE: 14 BRPM | OXYGEN SATURATION: 95 %

## 2025-04-25 PROCEDURE — 25010000002 LIDOCAINE PF 2% 2 % SOLUTION: Performed by: NURSE ANESTHETIST, CERTIFIED REGISTERED

## 2025-04-25 PROCEDURE — 25810000003 LACTATED RINGERS PER 1000 ML: Performed by: NURSE ANESTHETIST, CERTIFIED REGISTERED

## 2025-04-25 PROCEDURE — 25010000002 ATROPINE SULFATE 0.4 MG/ML SOLUTION: Performed by: NURSE ANESTHETIST, CERTIFIED REGISTERED

## 2025-04-25 PROCEDURE — 25010000002 PROPOFOL 10 MG/ML EMULSION: Performed by: NURSE ANESTHETIST, CERTIFIED REGISTERED

## 2025-04-25 RX ORDER — LIDOCAINE HYDROCHLORIDE 20 MG/ML
INJECTION, SOLUTION EPIDURAL; INFILTRATION; INTRACAUDAL; PERINEURAL AS NEEDED
Status: DISCONTINUED | OUTPATIENT
Start: 2025-04-25 | End: 2025-04-25 | Stop reason: SURG

## 2025-04-25 RX ORDER — PROPOFOL 10 MG/ML
VIAL (ML) INTRAVENOUS AS NEEDED
Status: DISCONTINUED | OUTPATIENT
Start: 2025-04-25 | End: 2025-04-25 | Stop reason: SURG

## 2025-04-25 RX ORDER — ATROPINE SULFATE 0.4 MG/ML
INJECTION, SOLUTION INTRAVENOUS AS NEEDED
Status: DISCONTINUED | OUTPATIENT
Start: 2025-04-25 | End: 2025-04-25 | Stop reason: SURG

## 2025-04-25 RX ORDER — SODIUM CHLORIDE, SODIUM LACTATE, POTASSIUM CHLORIDE, CALCIUM CHLORIDE 600; 310; 30; 20 MG/100ML; MG/100ML; MG/100ML; MG/100ML
30 INJECTION, SOLUTION INTRAVENOUS CONTINUOUS
Status: DISCONTINUED | OUTPATIENT
Start: 2025-04-25 | End: 2025-04-25 | Stop reason: HOSPADM

## 2025-04-25 RX ADMIN — LIDOCAINE HYDROCHLORIDE 100 MG: 20 SOLUTION INTRAVENOUS at 08:38

## 2025-04-25 RX ADMIN — PROPOFOL 100 MG: 10 INJECTION, EMULSION INTRAVENOUS at 08:38

## 2025-04-25 RX ADMIN — ATROPINE SULFATE 0.4 MG: 0.4 INJECTION, SOLUTION INTRAVENOUS at 08:41

## 2025-04-25 RX ADMIN — PROPOFOL 200 MCG/KG/MIN: 10 INJECTION, EMULSION INTRAVENOUS at 08:38

## 2025-04-25 RX ADMIN — SODIUM CHLORIDE, POTASSIUM CHLORIDE, SODIUM LACTATE AND CALCIUM CHLORIDE 30 ML/HR: 600; 310; 30; 20 INJECTION, SOLUTION INTRAVENOUS at 08:00

## 2025-04-25 NOTE — ANESTHESIA POSTPROCEDURE EVALUATION
Patient: Addie Yates    Procedure Summary       Date: 04/25/25 Room / Location: [facility] ENDOSCOPY 4 / [facility] ENDOSCOPY    Anesthesia Start: 0837 Anesthesia Stop: 0856    Procedure: COLONOSCOPY Diagnosis:       Chronic idiopathic constipation      Anal dysplasia      History of colon polyps      (Chronic idiopathic constipation [K59.04])      (Anal dysplasia [K62.82])      (History of colon polyps [Z86.0100])    Surgeons: Yodit Leroy MD Provider: Ridge Knutson CRNA    Anesthesia Type: general ASA Status: 3            Anesthesia Type: general    Vitals  Vitals Value Taken Time   /91 04/25/25 09:09   Temp 36.6 °C (97.8 °F) 04/25/25 09:09   Pulse 88 04/25/25 09:09   Resp 14 04/25/25 09:09   SpO2 95 % 04/25/25 09:09           Post Anesthesia Care and Evaluation    Post-procedure mental status: acceptable.  Pain management: satisfactory to patient    Airway patency: patent  Anesthetic complications: No anesthetic complications    Cardiovascular status: acceptable  Respiratory status: acceptable    Comments: Per chart review

## 2025-04-25 NOTE — H&P
Pre Procedure History & Physical    Chief Complaint:   Surveillance colonoscopy    Subjective     HPI:   64 yo F here for surveillance colonoscopy.    Past Medical History:   Past Medical History:   Diagnosis Date    Anxiety disorder, unspecified     Atherosclerotic heart disease of native coronary artery without angina pectoris     Carpal tunnel syndrome on right     Chronic pain     BASE OF SKULL/UPPERMOST NECK    Constipation, unspecified     DDD (degenerative disc disease), cervical     DDD (degenerative disc disease), lumbar     Diaphragmatic hernia without obstruction and without gangrene     Dyskinesia of esophagus     Gastroparesis     GERD (gastroesophageal reflux disease)     Glaucoma     Hiatal hernia     Hypothyroidism, unspecified     Long term (current) use of opiate analgesic     Medial epicondylitis     MI (myocardial infarction)     2015    Osteoarthritis     Osteoporosis without current pathological fracture     Other idiopathic scoliosis, site unspecified     Presence of intraocular lens     Primary generalized (osteo)arthritis     Right lateral epicondylitis     Toxic goiter     Unspecified hemorrhoids        Past Surgical History:  Past Surgical History:   Procedure Laterality Date    CARPAL TUNNEL RELEASE Left     COLONOSCOPY      2007, 2010, 2015 - WITH ADENOMA    COLONOSCOPY N/A 9/11/2023    Procedure: COLONOSCOPY WITH POLYPECTOMY AND BIOPSIES;  Surgeon: Yodit Leroy MD;  Location: [facility] ENDOSCOPY;  Service: Gastroenterology;  Laterality: N/A;  COLON POLYP    CORONARY ANGIOPLASTY WITH STENT PLACEMENT  11/2015    ENDOSCOPY      2008, 2016    ENDOSCOPY N/A 11/04/2022    Procedure: ESOPHAGOGASTRODUODENOSCOPY with biopsy;  Surgeon: Yodit Leroy MD;  Location: [facility] ENDOSCOPY;  Service: Gastroenterology;  Laterality: N/A;  gastritis, hiatal hernia    FOOT SURGERY Right 05/15/2023    HYSTERECTOMY  2004    LAPAROSCOPIC CHOLECYSTECTOMY  2017    REFRACTIVE SURGERY Bilateral      THYROID BIOPSY Right     TUBAL ABDOMINAL LIGATION      BI-TUBAL    UPPER GASTROINTESTINAL ENDOSCOPY         Family History:  Family History   Problem Relation Age of Onset    Leukemia Other     Kidney cancer Other     Diabetes Other     Malig Hyperthermia Neg Hx        Social History:   reports that she quit smoking about 21 years ago. Her smoking use included cigarettes. She started smoking about 23 years ago. She has a 4 pack-year smoking history. She has never used smokeless tobacco. She reports that she does not currently use alcohol. She reports that she does not use drugs.    Medications:   Medications Prior to Admission   Medication Sig Dispense Refill Last Dose/Taking    aspirin 81 MG EC tablet Take 1 tablet by mouth Every Night.       atorvastatin (LIPITOR) 80 MG tablet Take 1 tablet by mouth Daily.       busPIRone (BUSPAR) 5 MG tablet Take 1 tablet by mouth 2 (Two) Times a Day. 180 tablet 1     Calcium Citrate-Vitamin D3 (CITRACAL) 315-6.25 MG-MCG tablet tablet Take  by mouth Daily.       docusate sodium (COLACE) 100 MG capsule Take 1 capsule by mouth 2 (Two) Times a Day.       DULoxetine (CYMBALTA) 60 MG capsule Take 1 capsule by mouth Daily. 90 capsule 1     famotidine (PEPCID) 40 MG tablet TAKE ONE TABLET BY MOUTH EVERY NIGHT AT BEDTIME 30 tablet 5     HYDROcodone-acetaminophen (NORCO) 7.5-325 MG per tablet Take 1 tablet by mouth 2 (Two) Times a Day As Needed. for pain       ketoconazole (NIZORAL) 2 % shampoo SHAMPOO SCALP 2-3 TIMES WEEKLY AS NEEDED. ALTERNATE WITH OTHER SHAMPOO       levothyroxine (SYNTHROID, LEVOTHROID) 112 MCG tablet Take 1 tablet by mouth Daily. 30 tablet 5     lisinopril (PRINIVIL,ZESTRIL) 5 MG tablet Take 1 tablet by mouth Every Night.       Magnesium Citrate 100 MG capsule Take 1 tablet by mouth Every Other Day.       meloxicam (MOBIC) 15 MG tablet Take 1 tablet by mouth Daily As Needed for Moderate Pain. 30 tablet 2     nitroglycerin (Nitrostat) 0.4 MG SL tablet Place 1 tablet  "under the tongue Every 5 (Five) Minutes As Needed for Chest Pain. Take no more than 3 doses in 15 minutes. 20 tablet 0     omeprazole (priLOSEC) 40 MG capsule Take 1 capsule by mouth Daily. 90 capsule 1     Plecanatide (Trulance) 3 MG tablet Take 1 tablet by mouth Daily. 90 tablet 1     polyethylene glycol (MIRALAX) 17 GM/SCOOP powder Take 17 g by mouth Daily. 527 g 1     Prolia 60 MG/ML solution prefilled syringe syringe INJECT 1 SYRINGE UNDER THE SKIN ONCE EVERY 6 MONTHS INTO THE APPROPRIATE AREA. 1 each 0     saccharomyces boulardii (FLORASTOR) 250 MG capsule Take 1 capsule by mouth Daily.       simethicone (MYLICON,GAS-X) 125 MG capsule capsule Take 1 capsule by mouth As Needed.       Sodium Sulfate-Mag Sulfate-KCl (Sutab) 1724-229-253 MG tablet Take 12 tablets by mouth 2 (Two) Times a Day. 24 tablet 0        Allergies:  Patient has no known allergies.    ROS:    Pertinent items are noted in HPI     Objective     Blood pressure 152/97, pulse 76, temperature 98.2 °F (36.8 °C), temperature source Temporal, resp. rate 12, height 154.9 cm (61\"), weight 67.1 kg (147 lb 14.9 oz), SpO2 94%.    Physical Exam   Constitutional: Pt is oriented to person, place, and time and well-developed, well-nourished, and in no distress.   Mouth/Throat: Oropharynx is clear and moist.   Neck: Normal range of motion.   Cardiovascular: Normal rate, regular rhythm and normal heart sounds.    Pulmonary/Chest: Effort normal and breath sounds normal.   Abdominal: Soft. Nontender  Skin: Skin is warm and dry.   Psychiatric: Mood, memory, affect and judgment normal.     Assessment & Plan     Diagnosis:  Surveillance colonoscopy    Anticipated Surgical Procedure:  Colonoscopy    The risks, benefits, and alternatives of this procedure have been discussed with the patient or the responsible party- the patient understands and agrees to proceed.           "

## 2025-04-28 ENCOUNTER — TELEPHONE (OUTPATIENT)
Dept: GASTROENTEROLOGY | Facility: CLINIC | Age: 65
End: 2025-04-28
Payer: MEDICARE

## 2025-04-28 NOTE — LETTER
May 2, 2025    Addie Pedroza [employer]  [place] [address]      2025    Dear ,      Patient Name: Addie Yates  : 1960      This patient is waiting to have a Colonoscopy and/or Esophagogastroduodenoscopy which I will perform at [facility] on _______2025___________________. Please respond to this request noting your recommendations regarding clearance from a Cardiac  standpoint.  You may contact our office at [phone] Option 3 with any questions. I appreciate your prompt response in this matter. Please return this form to our office as soon as possible to Jess ZEE MA.    ____ I approve my patient from a Cardiac  standpoint    ____ I do NOT approve my patient from a Cardiac  standpoint at this time    Please inform our office if the patient requires additional follow-up from your office prior to scheduled procedure date.      Please specify clearance expiration date:____________________________________      Approving physician name (please print): _____________________________________________      Approving physician signature: ________________________________ Date:________________  Sincerely,  [facility] - Gastroenterology   Dr. Yodit Leroy MD       Please fax approval or denial to our office as soon as possible.

## 2025-04-28 NOTE — TELEPHONE ENCOUNTER
Colonoscopy was incomplete due to inadequate prep.  If the patient would like to reschedule her colonoscopy anytime in the next 1 to 2 months we would have her do a low residue diet 1 week prior.  The day before her scope we would have her do 1 bottle of magnesium citrate at noon and then the split dose prep like normal that evening.  If she is agreeable I will place the case request and order the prep.

## 2025-04-29 NOTE — TELEPHONE ENCOUNTER
Pt is agreeable to repeat colonoscopy. She would like to be scheduled 6/12/2025. Pt is aware we do not recommend she uses Sutab again. Pt will need Zofran for liquid prep.

## 2025-05-01 ENCOUNTER — PREP FOR SURGERY (OUTPATIENT)
Dept: OTHER | Facility: HOSPITAL | Age: 65
End: 2025-05-01
Payer: MEDICARE

## 2025-05-01 DIAGNOSIS — Z86.0100 HISTORY OF COLON POLYPS: ICD-10-CM

## 2025-05-01 DIAGNOSIS — K62.82 ANAL DYSPLASIA: ICD-10-CM

## 2025-05-01 DIAGNOSIS — K59.04 CHRONIC IDIOPATHIC CONSTIPATION: Primary | ICD-10-CM

## 2025-05-01 RX ORDER — ONDANSETRON 8 MG/1
8 TABLET, ORALLY DISINTEGRATING ORAL EVERY 8 HOURS PRN
Qty: 30 TABLET | Refills: 1 | Status: SHIPPED | OUTPATIENT
Start: 2025-05-01

## 2025-05-01 RX ORDER — POLYETHYLENE GLYCOL 3350, SODIUM CHLORIDE, SODIUM BICARBONATE, POTASSIUM CHLORIDE 420; 11.2; 5.72; 1.48 G/4L; G/4L; G/4L; G/4L
4000 POWDER, FOR SOLUTION ORAL ONCE
Qty: 4000 ML | Refills: 0 | Status: SHIPPED | OUTPATIENT
Start: 2025-05-01 | End: 2025-05-01

## 2025-05-05 ENCOUNTER — TELEPHONE (OUTPATIENT)
Dept: GASTROENTEROLOGY | Facility: CLINIC | Age: 65
End: 2025-05-05
Payer: MEDICARE

## 2025-05-05 NOTE — TELEPHONE ENCOUNTER
ENDO RECONCILIATION  Verify source of procedure(s): Other  If other, please list source: TE 4/28/25    TIME OUT-CONFIRM CORRECT PROCEDURE: Colonoscopy  Cardiology: Dr Daniels  Pulmonology:  Blood thinner:   GLP-1:  Additional DX/indication for procedure:     Please include any other notes relevant to endo reconciliation:     Cardiac clearance requested from Dr Daniels on 5/2/25.

## 2025-05-07 DIAGNOSIS — M81.0 AGE-RELATED OSTEOPOROSIS WITHOUT CURRENT PATHOLOGICAL FRACTURE: ICD-10-CM

## 2025-05-07 RX ORDER — DENOSUMAB 60 MG/ML
INJECTION SUBCUTANEOUS
Qty: 1 EACH | Refills: 0 | Status: CANCELLED | OUTPATIENT
Start: 2025-05-07

## 2025-05-07 NOTE — TELEPHONE ENCOUNTER
Caller: Addie Yates    Relationship: Self    Best call back number: [phone]     What was the call regarding: PATIENT CALLED IN STATING SHE IS GOING TO GET HER PROLIA INJECTION WITH THE ALLERGY ROOM ON 5.22.25 AND STATES THE ALLERGY ROOM TOLD PATIENT TO LET MD CARLOS KNOW SO SHE COULD SEND THE MEDICATION TO THE ALLERGY ROOM, PLEASE ADVISE

## 2025-05-12 DIAGNOSIS — M15.9 GENERALIZED OSTEOARTHRITIS: ICD-10-CM

## 2025-05-12 RX ORDER — TRAMADOL HYDROCHLORIDE 50 MG/1
50 TABLET ORAL 2 TIMES DAILY PRN
Qty: 60 TABLET | Refills: 0 | OUTPATIENT
Start: 2025-05-12

## 2025-05-20 ENCOUNTER — TELEPHONE (OUTPATIENT)
Dept: FAMILY MEDICINE CLINIC | Age: 65
End: 2025-05-20
Payer: MEDICARE

## 2025-05-20 DIAGNOSIS — M81.0 AGE-RELATED OSTEOPOROSIS WITHOUT CURRENT PATHOLOGICAL FRACTURE: ICD-10-CM

## 2025-05-20 RX ORDER — DENOSUMAB 60 MG/ML
60 INJECTION SUBCUTANEOUS ONCE
Qty: 1 EACH | Refills: 0 | Status: SHIPPED | OUTPATIENT
Start: 2025-05-20 | End: 2025-05-20

## 2025-05-20 NOTE — TELEPHONE ENCOUNTER
Spoke to Patient and she states her insurance has changed according to [employer],  Rx needs to be send to [facility].  Rx Sent.

## 2025-05-23 RX ORDER — MELOXICAM 15 MG/1
15 TABLET ORAL DAILY PRN
Qty: 30 TABLET | Refills: 2 | Status: SHIPPED | OUTPATIENT
Start: 2025-05-23

## 2025-06-02 ENCOUNTER — TELEPHONE (OUTPATIENT)
Dept: FAMILY MEDICINE CLINIC | Age: 65
End: 2025-06-02
Payer: MEDICARE

## 2025-06-02 NOTE — TELEPHONE ENCOUNTER
Caller: Addie Yates    Relationship: Self    Best call back number: [phone]     What is the best time to reach you: ANYTIME     Who are you requesting to speak with (clinical staff, provider,  specific staff member): CLINICAL     What was the call regarding: PATIENT IS CALLING TO CHECK TO SEE IF PROLIA SHOT WAS RECEIVED.

## 2025-06-09 ENCOUNTER — CLINICAL SUPPORT (OUTPATIENT)
Dept: FAMILY MEDICINE CLINIC | Age: 65
End: 2025-06-09
Payer: MEDICARE

## 2025-06-09 DIAGNOSIS — K44.9 HIATAL HERNIA: ICD-10-CM

## 2025-06-09 DIAGNOSIS — F41.1 GENERALIZED ANXIETY DISORDER: ICD-10-CM

## 2025-06-09 DIAGNOSIS — M81.0 AGE-RELATED OSTEOPOROSIS WITHOUT CURRENT PATHOLOGICAL FRACTURE: Primary | ICD-10-CM

## 2025-06-09 DIAGNOSIS — K21.9 GASTROESOPHAGEAL REFLUX DISEASE WITHOUT ESOPHAGITIS: ICD-10-CM

## 2025-06-09 PROCEDURE — 96372 THER/PROPH/DIAG INJ SC/IM: CPT | Performed by: FAMILY MEDICINE

## 2025-06-11 RX ORDER — OMEPRAZOLE 40 MG/1
40 CAPSULE, DELAYED RELEASE ORAL DAILY
Qty: 90 CAPSULE | Refills: 1 | Status: SHIPPED | OUTPATIENT
Start: 2025-06-11

## 2025-06-11 RX ORDER — BUSPIRONE HYDROCHLORIDE 5 MG/1
5 TABLET ORAL 2 TIMES DAILY
Qty: 180 TABLET | Refills: 1 | Status: SHIPPED | OUTPATIENT
Start: 2025-06-11

## 2025-06-12 ENCOUNTER — TELEPHONE (OUTPATIENT)
Dept: GASTROENTEROLOGY | Facility: CLINIC | Age: 65
End: 2025-06-12
Payer: MEDICARE

## 2025-06-12 NOTE — TELEPHONE ENCOUNTER
Caller: ROBERTO JORDAN    Relationship to patient: SELF    Best call back number: [phone]    Chief complaint: NEEDS TO R/S COLONOSCOPY. WASN'T CLEANED OUT ALL THE WAY    Type of visit: COLONOSCOPY     Requested date:      If rescheduling, when is the original appointment: 6.12     Additional notes: PT IS CURIOUS ABOUT ALTERNATIVES TO THE BOWEL PREP

## 2025-06-12 NOTE — TELEPHONE ENCOUNTER
Pt states she completed Magnesium citrate at noon day before with Gol[place]ley starting at 5 PM she did samley finish the prep.     She also completed a low residue diet 1 week prior.       She states as of a few minutes ago it was still dark stools and cannot be seen through.   She also takes colace and Trulance.     Please advise if repeat is needed or not and what prep can be done.

## 2025-06-13 RX ORDER — LACTULOSE 10 G/15ML
20 SOLUTION ORAL 2 TIMES DAILY
Qty: 1800 ML | Refills: 3 | Status: SHIPPED | OUTPATIENT
Start: 2025-06-13

## 2025-06-16 RX ORDER — PRUCALOPRIDE 2 MG/1
2 TABLET ORAL DAILY
Qty: 90 TABLET | Refills: 1 | Status: SHIPPED | OUTPATIENT
Start: 2025-06-16

## 2025-06-16 NOTE — TELEPHONE ENCOUNTER
"Pt states she tried it, it did not help she said she has \"been taking it for over a year\" and it does not help.   "

## 2025-06-16 NOTE — TELEPHONE ENCOUNTER
"Pt states Motegrity did work better for her, Trulance did work well but she felt it \"slowed down\"   "

## 2025-08-08 DIAGNOSIS — K21.9 GASTROESOPHAGEAL REFLUX DISEASE WITHOUT ESOPHAGITIS: ICD-10-CM

## 2025-08-08 RX ORDER — MELOXICAM 15 MG/1
15 TABLET ORAL DAILY PRN
Qty: 30 TABLET | Refills: 2 | Status: SHIPPED | OUTPATIENT
Start: 2025-08-08

## 2025-08-08 RX ORDER — FAMOTIDINE 40 MG/1
40 TABLET, FILM COATED ORAL
Qty: 30 TABLET | Refills: 2 | Status: SHIPPED | OUTPATIENT
Start: 2025-08-08

## (undated) DEVICE — SINGLE-USE BIOPSY FORCEPS: Brand: RADIAL JAW 4

## (undated) DEVICE — THE STERILE LIGHT HANDLE COVER IS USED WITH STERIS SURGICAL LIGHTING AND VISUALIZATION SYSTEMS.

## (undated) DEVICE — Device

## (undated) DEVICE — DEFENDO AIR WATER SUCTION AND BIOPSY VALVE KIT FOR  OLYMPUS: Brand: DEFENDO AIR/WATER/SUCTION AND BIOPSY VALVE

## (undated) DEVICE — THE SINGLE USE ETRAP – POLYP TRAP IS USED FOR SUCTION RETRIEVAL OF ENDOSCOPICALLY REMOVED POLYPS.: Brand: ETRAP

## (undated) DEVICE — BLCK/BITE BLOX WO/DENTL/RIM W/STRAP 54F

## (undated) DEVICE — CONN JET HYDRA H20 AUXILIARY DISP

## (undated) DEVICE — Device: Brand: DEFENDO AIR/WATER/SUCTION AND BIOPSY VALVE

## (undated) DEVICE — LINER SURG CANSTR SXN S/RIGD 1500CC

## (undated) DEVICE — SOLIDIFIER LIQLOC PLS 1500CC BT

## (undated) DEVICE — SOL IRRG H2O PL/BG 1000ML STRL

## (undated) DEVICE — SNAR E/S POLYP SNAREMASTER OVL/10MM 2.8X2300MM YEL